# Patient Record
Sex: MALE | Race: OTHER | Employment: OTHER | URBAN - METROPOLITAN AREA
[De-identification: names, ages, dates, MRNs, and addresses within clinical notes are randomized per-mention and may not be internally consistent; named-entity substitution may affect disease eponyms.]

---

## 2017-02-25 ENCOUNTER — APPOINTMENT (OUTPATIENT)
Dept: GENERAL RADIOLOGY | Age: 57
End: 2017-02-25
Payer: COMMERCIAL

## 2017-02-25 ENCOUNTER — HOSPITAL ENCOUNTER (EMERGENCY)
Age: 57
Discharge: LEFT BEFORE DISCHARGE | End: 2017-02-25
Payer: COMMERCIAL

## 2017-02-25 VITALS
OXYGEN SATURATION: 100 % | BODY MASS INDEX: 22.9 KG/M2 | HEART RATE: 93 BPM | SYSTOLIC BLOOD PRESSURE: 141 MMHG | TEMPERATURE: 98.5 F | WEIGHT: 160 LBS | DIASTOLIC BLOOD PRESSURE: 70 MMHG | RESPIRATION RATE: 20 BRPM | HEIGHT: 70 IN

## 2017-02-25 DIAGNOSIS — R07.9 CHEST PAIN, UNSPECIFIED TYPE: Primary | ICD-10-CM

## 2017-02-25 LAB
ALBUMIN SERPL-MCNC: 4.2 G/DL (ref 3.9–4.9)
ALP BLD-CCNC: 68 U/L (ref 35–104)
ALT SERPL-CCNC: 24 U/L (ref 0–41)
AMPHETAMINE SCREEN, URINE: NORMAL
ANION GAP SERPL CALCULATED.3IONS-SCNC: 10 MEQ/L (ref 7–13)
APTT: 28 SEC (ref 21.6–35.4)
AST SERPL-CCNC: 25 U/L (ref 0–40)
BARBITURATE SCREEN URINE: NORMAL
BASOPHILS ABSOLUTE: 0 K/UL (ref 0–0.2)
BASOPHILS RELATIVE PERCENT: 0.3 %
BENZODIAZEPINE SCREEN, URINE: NORMAL
BILIRUB SERPL-MCNC: 0.4 MG/DL (ref 0–1.2)
BILIRUBIN URINE: NEGATIVE
BLOOD, URINE: NEGATIVE
BUN BLDV-MCNC: 14 MG/DL (ref 6–20)
CALCIUM SERPL-MCNC: 8.9 MG/DL (ref 8.6–10.2)
CANNABINOID SCREEN URINE: NORMAL
CHLORIDE BLD-SCNC: 100 MEQ/L (ref 98–107)
CLARITY: CLEAR
CO2: 25 MEQ/L (ref 22–29)
COCAINE METABOLITE SCREEN URINE: NORMAL
COLOR: YELLOW
CREAT SERPL-MCNC: 0.97 MG/DL (ref 0.7–1.2)
EOSINOPHILS ABSOLUTE: 0 K/UL (ref 0–0.7)
EOSINOPHILS RELATIVE PERCENT: 0.3 %
ETHANOL PERCENT: NORMAL G/DL
ETHANOL: <10 MG/DL (ref 0–0.08)
GFR AFRICAN AMERICAN: >60
GFR NON-AFRICAN AMERICAN: >60
GLOBULIN: 2.8 G/DL (ref 2.3–3.5)
GLUCOSE BLD-MCNC: 104 MG/DL (ref 74–109)
GLUCOSE URINE: NEGATIVE MG/DL
HCT VFR BLD CALC: 41.3 % (ref 42–52)
HEMOGLOBIN: 14.1 G/DL (ref 14–18)
INR BLD: 1
KETONES, URINE: NEGATIVE MG/DL
LACTIC ACID: 1 MMOL/L (ref 0.5–2.2)
LEUKOCYTE ESTERASE, URINE: NEGATIVE
LYMPHOCYTES ABSOLUTE: 0.8 K/UL (ref 1–4.8)
LYMPHOCYTES RELATIVE PERCENT: 9.3 %
Lab: NORMAL
MCH RBC QN AUTO: 30.8 PG (ref 27–31.3)
MCHC RBC AUTO-ENTMCNC: 34.1 % (ref 33–37)
MCV RBC AUTO: 90.4 FL (ref 80–100)
MONOCYTES ABSOLUTE: 0.4 K/UL (ref 0.2–0.8)
MONOCYTES RELATIVE PERCENT: 4.4 %
NEUTROPHILS ABSOLUTE: 7.2 K/UL (ref 1.4–6.5)
NEUTROPHILS RELATIVE PERCENT: 85.7 %
NITRITE, URINE: NEGATIVE
OPIATE SCREEN URINE: NORMAL
PDW BLD-RTO: 14.2 % (ref 11.5–14.5)
PH UA: 6 (ref 5–9)
PHENCYCLIDINE SCREEN URINE: NORMAL
PLATELET # BLD: 207 K/UL (ref 130–400)
POTASSIUM SERPL-SCNC: 4.1 MEQ/L (ref 3.5–5.1)
PRO-BNP: 215 PG/ML
PROTEIN UA: NEGATIVE MG/DL
PROTHROMBIN TIME: 10.9 SEC (ref 8.1–13.7)
RBC # BLD: 4.57 M/UL (ref 4.7–6.1)
SODIUM BLD-SCNC: 135 MEQ/L (ref 132–144)
SPECIFIC GRAVITY UA: 1.01 (ref 1–1.03)
TOTAL CK: 109 U/L (ref 0–190)
TOTAL PROTEIN: 7 G/DL (ref 6.4–8.1)
TROPONIN: <0.01 NG/ML (ref 0–0.01)
URINE REFLEX TO CULTURE: NORMAL
UROBILINOGEN, URINE: 0.2 E.U./DL
WBC # BLD: 8.4 K/UL (ref 4.8–10.8)

## 2017-02-25 PROCEDURE — 2580000003 HC RX 258: Performed by: PHYSICIAN ASSISTANT

## 2017-02-25 PROCEDURE — 93005 ELECTROCARDIOGRAM TRACING: CPT

## 2017-02-25 PROCEDURE — 99285 EMERGENCY DEPT VISIT HI MDM: CPT

## 2017-02-25 PROCEDURE — 71020 XR CHEST STANDARD TWO VW: CPT

## 2017-02-25 RX ORDER — 0.9 % SODIUM CHLORIDE 0.9 %
1000 INTRAVENOUS SOLUTION INTRAVENOUS ONCE
Status: COMPLETED | OUTPATIENT
Start: 2017-02-25 | End: 2017-02-25

## 2017-02-25 RX ADMIN — SODIUM CHLORIDE 1000 ML: 9 INJECTION, SOLUTION INTRAVENOUS at 16:32

## 2017-02-25 ASSESSMENT — PAIN DESCRIPTION - DESCRIPTORS
DESCRIPTORS: HEADACHE;TIGHTNESS
DESCRIPTORS: HEADACHE

## 2017-02-25 ASSESSMENT — ENCOUNTER SYMPTOMS
PHOTOPHOBIA: 0
ABDOMINAL PAIN: 0
VOMITING: 0
BACK PAIN: 0
NAUSEA: 0
CHEST TIGHTNESS: 1
TROUBLE SWALLOWING: 0
DIARRHEA: 0
SHORTNESS OF BREATH: 1
COUGH: 1
SORE THROAT: 0

## 2017-02-25 ASSESSMENT — PAIN DESCRIPTION - LOCATION
LOCATION: CHEST;HEAD
LOCATION: HEAD

## 2017-02-25 ASSESSMENT — PAIN SCALES - GENERAL
PAINLEVEL_OUTOF10: 5
PAINLEVEL_OUTOF10: 5

## 2017-02-25 ASSESSMENT — HEART SCORE: ECG: 0

## 2017-02-27 LAB
EKG ATRIAL RATE: 73 BPM
EKG P AXIS: 65 DEGREES
EKG P-R INTERVAL: 136 MS
EKG Q-T INTERVAL: 454 MS
EKG QRS DURATION: 136 MS
EKG QTC CALCULATION (BAZETT): 500 MS
EKG R AXIS: 95 DEGREES
EKG T AXIS: -8 DEGREES
EKG VENTRICULAR RATE: 73 BPM

## 2017-04-05 ENCOUNTER — HOSPITAL ENCOUNTER (INPATIENT)
Age: 57
LOS: 4 days | Discharge: LEFT BEFORE DISCHARGE | DRG: 950 | End: 2017-04-09
Attending: EMERGENCY MEDICINE | Admitting: FAMILY MEDICINE
Payer: COMMERCIAL

## 2017-04-05 ENCOUNTER — APPOINTMENT (OUTPATIENT)
Dept: GENERAL RADIOLOGY | Age: 57
DRG: 950 | End: 2017-04-05
Payer: COMMERCIAL

## 2017-04-05 DIAGNOSIS — L03.114 CELLULITIS OF LEFT UPPER EXTREMITY: ICD-10-CM

## 2017-04-05 DIAGNOSIS — L03.113 CELLULITIS OF RIGHT UPPER EXTREMITY: ICD-10-CM

## 2017-04-05 DIAGNOSIS — M70.70 HIP BURSITIS: ICD-10-CM

## 2017-04-05 DIAGNOSIS — M54.50 LOW BACK PAIN: ICD-10-CM

## 2017-04-05 DIAGNOSIS — M54.16 LUMBAR RADICULAR PAIN: ICD-10-CM

## 2017-04-05 DIAGNOSIS — I47.1 PAROXYSMAL SUPRAVENTRICULAR TACHYCARDIA (HCC): ICD-10-CM

## 2017-04-05 DIAGNOSIS — M79.10 MUSCULAR PAIN: ICD-10-CM

## 2017-04-05 DIAGNOSIS — L02.91 ABSCESS: Primary | ICD-10-CM

## 2017-04-05 LAB
ALBUMIN SERPL-MCNC: 3.5 G/DL (ref 3.9–4.9)
ALP BLD-CCNC: 55 U/L (ref 35–104)
ALT SERPL-CCNC: 20 U/L (ref 0–41)
ANION GAP SERPL CALCULATED.3IONS-SCNC: 11 MEQ/L (ref 7–13)
AST SERPL-CCNC: 17 U/L (ref 0–40)
BILIRUB SERPL-MCNC: 0.5 MG/DL (ref 0–1.2)
BUN BLDV-MCNC: 16 MG/DL (ref 6–20)
CALCIUM SERPL-MCNC: 8.6 MG/DL (ref 8.6–10.2)
CHLORIDE BLD-SCNC: 100 MEQ/L (ref 98–107)
CO2: 26 MEQ/L (ref 22–29)
CREAT SERPL-MCNC: 1.16 MG/DL (ref 0.7–1.2)
GFR AFRICAN AMERICAN: >60
GFR NON-AFRICAN AMERICAN: >60
GLOBULIN: 3 G/DL (ref 2.3–3.5)
GLUCOSE BLD-MCNC: 111 MG/DL (ref 74–109)
HCT VFR BLD CALC: 37.4 % (ref 42–52)
HEMOGLOBIN: 12.9 G/DL (ref 14–18)
MCH RBC QN AUTO: 30.5 PG (ref 27–31.3)
MCHC RBC AUTO-ENTMCNC: 34.6 % (ref 33–37)
MCV RBC AUTO: 88.2 FL (ref 80–100)
PDW BLD-RTO: 14 % (ref 11.5–14.5)
PLATELET # BLD: 224 K/UL (ref 130–400)
POTASSIUM SERPL-SCNC: 3.7 MEQ/L (ref 3.5–5.1)
RBC # BLD: 4.24 M/UL (ref 4.7–6.1)
SODIUM BLD-SCNC: 137 MEQ/L (ref 132–144)
TOTAL PROTEIN: 6.5 G/DL (ref 6.4–8.1)
WBC # BLD: 13.1 K/UL (ref 4.8–10.8)

## 2017-04-05 PROCEDURE — 87070 CULTURE OTHR SPECIMN AEROBIC: CPT

## 2017-04-05 PROCEDURE — 99285 EMERGENCY DEPT VISIT HI MDM: CPT

## 2017-04-05 PROCEDURE — 87205 SMEAR GRAM STAIN: CPT

## 2017-04-05 PROCEDURE — 96365 THER/PROPH/DIAG IV INF INIT: CPT

## 2017-04-05 PROCEDURE — 87075 CULTR BACTERIA EXCEPT BLOOD: CPT

## 2017-04-05 PROCEDURE — 96366 THER/PROPH/DIAG IV INF ADDON: CPT

## 2017-04-05 PROCEDURE — 93005 ELECTROCARDIOGRAM TRACING: CPT

## 2017-04-05 PROCEDURE — 80053 COMPREHEN METABOLIC PANEL: CPT

## 2017-04-05 PROCEDURE — 85027 COMPLETE CBC AUTOMATED: CPT

## 2017-04-05 PROCEDURE — 73090 X-RAY EXAM OF FOREARM: CPT

## 2017-04-05 PROCEDURE — 2580000003 HC RX 258: Performed by: EMERGENCY MEDICINE

## 2017-04-05 PROCEDURE — 87040 BLOOD CULTURE FOR BACTERIA: CPT

## 2017-04-05 PROCEDURE — 6360000002 HC RX W HCPCS: Performed by: EMERGENCY MEDICINE

## 2017-04-05 PROCEDURE — 36415 COLL VENOUS BLD VENIPUNCTURE: CPT

## 2017-04-05 PROCEDURE — 1210000000 HC MED SURG R&B

## 2017-04-05 PROCEDURE — 73080 X-RAY EXAM OF ELBOW: CPT

## 2017-04-05 PROCEDURE — 96375 TX/PRO/DX INJ NEW DRUG ADDON: CPT

## 2017-04-05 RX ORDER — FAMOTIDINE 20 MG/1
20 TABLET, FILM COATED ORAL 2 TIMES DAILY
Status: DISCONTINUED | OUTPATIENT
Start: 2017-04-05 | End: 2017-04-09 | Stop reason: HOSPADM

## 2017-04-05 RX ORDER — KETOROLAC TROMETHAMINE 30 MG/ML
30 INJECTION, SOLUTION INTRAMUSCULAR; INTRAVENOUS ONCE
Status: COMPLETED | OUTPATIENT
Start: 2017-04-05 | End: 2017-04-05

## 2017-04-05 RX ORDER — CITALOPRAM 40 MG/1
40 TABLET ORAL DAILY
Status: ON HOLD | COMMUNITY
End: 2017-06-30 | Stop reason: HOSPADM

## 2017-04-05 RX ORDER — ONDANSETRON 2 MG/ML
4 INJECTION INTRAMUSCULAR; INTRAVENOUS EVERY 6 HOURS PRN
Status: DISCONTINUED | OUTPATIENT
Start: 2017-04-05 | End: 2017-04-09 | Stop reason: HOSPADM

## 2017-04-05 RX ORDER — 0.9 % SODIUM CHLORIDE 0.9 %
1000 INTRAVENOUS SOLUTION INTRAVENOUS ONCE
Status: COMPLETED | OUTPATIENT
Start: 2017-04-05 | End: 2017-04-05

## 2017-04-05 RX ORDER — SODIUM CHLORIDE 0.9 % (FLUSH) 0.9 %
10 SYRINGE (ML) INJECTION PRN
Status: DISCONTINUED | OUTPATIENT
Start: 2017-04-05 | End: 2017-04-09 | Stop reason: HOSPADM

## 2017-04-05 RX ORDER — SODIUM CHLORIDE 0.9 % (FLUSH) 0.9 %
10 SYRINGE (ML) INJECTION EVERY 12 HOURS SCHEDULED
Status: DISCONTINUED | OUTPATIENT
Start: 2017-04-05 | End: 2017-04-09 | Stop reason: HOSPADM

## 2017-04-05 RX ORDER — LISINOPRIL 10 MG/1
20 TABLET ORAL DAILY
Status: ON HOLD | COMMUNITY
End: 2017-06-30 | Stop reason: HOSPADM

## 2017-04-05 RX ORDER — TRAMADOL HYDROCHLORIDE 50 MG/1
50 TABLET ORAL EVERY 6 HOURS PRN
Status: DISCONTINUED | OUTPATIENT
Start: 2017-04-05 | End: 2017-04-06 | Stop reason: ALTCHOICE

## 2017-04-05 RX ORDER — IBUPROFEN 400 MG/1
400 TABLET ORAL EVERY 6 HOURS PRN
Status: DISCONTINUED | OUTPATIENT
Start: 2017-04-05 | End: 2017-04-08

## 2017-04-05 RX ORDER — BUPRENORPHINE HYDROCHLORIDE AND NALOXONE HYDROCHLORIDE DIHYDRATE 8; 2 MG/1; MG/1
1 TABLET SUBLINGUAL 2 TIMES DAILY
COMMUNITY
End: 2019-01-01

## 2017-04-05 RX ORDER — ACETAMINOPHEN 325 MG/1
650 TABLET ORAL EVERY 4 HOURS PRN
Status: DISCONTINUED | OUTPATIENT
Start: 2017-04-05 | End: 2017-04-06 | Stop reason: ALTCHOICE

## 2017-04-05 RX ADMIN — VANCOMYCIN HYDROCHLORIDE 1000 MG: 1 INJECTION, POWDER, LYOPHILIZED, FOR SOLUTION INTRAVENOUS at 20:03

## 2017-04-05 RX ADMIN — SODIUM CHLORIDE 1000 ML: 900 INJECTION, SOLUTION INTRAVENOUS at 20:03

## 2017-04-05 RX ADMIN — KETOROLAC TROMETHAMINE 30 MG: 30 INJECTION, SOLUTION INTRAMUSCULAR; INTRAVENOUS at 20:02

## 2017-04-05 ASSESSMENT — PAIN SCALES - GENERAL
PAINLEVEL_OUTOF10: 4
PAINLEVEL_OUTOF10: 0
PAINLEVEL_OUTOF10: 8

## 2017-04-06 LAB
ANION GAP SERPL CALCULATED.3IONS-SCNC: 8 MEQ/L (ref 7–13)
BASOPHILS ABSOLUTE: 0 K/UL (ref 0–0.2)
BASOPHILS RELATIVE PERCENT: 0.5 %
BUN BLDV-MCNC: 21 MG/DL (ref 6–20)
CALCIUM SERPL-MCNC: 8.3 MG/DL (ref 8.6–10.2)
CHLORIDE BLD-SCNC: 104 MEQ/L (ref 98–107)
CO2: 24 MEQ/L (ref 22–29)
CREAT SERPL-MCNC: 1.4 MG/DL (ref 0.7–1.2)
EOSINOPHILS ABSOLUTE: 0.1 K/UL (ref 0–0.7)
EOSINOPHILS RELATIVE PERCENT: 1.3 %
GFR AFRICAN AMERICAN: >60
GFR NON-AFRICAN AMERICAN: 52.1
GLUCOSE BLD-MCNC: 122 MG/DL (ref 74–109)
GLUCOSE BLD-MCNC: 142 MG/DL (ref 60–115)
HCT VFR BLD CALC: 36.5 % (ref 42–52)
HEMOGLOBIN: 12.7 G/DL (ref 14–18)
LYMPHOCYTES ABSOLUTE: 1.3 K/UL (ref 1–4.8)
LYMPHOCYTES RELATIVE PERCENT: 15.6 %
MCH RBC QN AUTO: 30.8 PG (ref 27–31.3)
MCHC RBC AUTO-ENTMCNC: 34.8 % (ref 33–37)
MCV RBC AUTO: 88.5 FL (ref 80–100)
MONOCYTES ABSOLUTE: 0.6 K/UL (ref 0.2–0.8)
MONOCYTES RELATIVE PERCENT: 6.4 %
NEUTROPHILS ABSOLUTE: 6.5 K/UL (ref 1.4–6.5)
NEUTROPHILS RELATIVE PERCENT: 76.2 %
PDW BLD-RTO: 14.1 % (ref 11.5–14.5)
PERFORMED ON: ABNORMAL
PLATELET # BLD: 214 K/UL (ref 130–400)
POTASSIUM SERPL-SCNC: 3.8 MEQ/L (ref 3.5–5.1)
RBC # BLD: 4.12 M/UL (ref 4.7–6.1)
SODIUM BLD-SCNC: 136 MEQ/L (ref 132–144)
VANCOMYCIN TROUGH: 15.3 UG/ML (ref 10–20)
WBC # BLD: 8.6 K/UL (ref 4.8–10.8)

## 2017-04-06 PROCEDURE — 85025 COMPLETE CBC W/AUTO DIFF WBC: CPT

## 2017-04-06 PROCEDURE — 6370000000 HC RX 637 (ALT 250 FOR IP): Performed by: INTERNAL MEDICINE

## 2017-04-06 PROCEDURE — 36415 COLL VENOUS BLD VENIPUNCTURE: CPT

## 2017-04-06 PROCEDURE — 80048 BASIC METABOLIC PNL TOTAL CA: CPT

## 2017-04-06 PROCEDURE — 80202 ASSAY OF VANCOMYCIN: CPT

## 2017-04-06 PROCEDURE — 2580000003 HC RX 258: Performed by: INTERNAL MEDICINE

## 2017-04-06 PROCEDURE — 2580000003 HC RX 258: Performed by: FAMILY MEDICINE

## 2017-04-06 PROCEDURE — 6360000002 HC RX W HCPCS: Performed by: INTERNAL MEDICINE

## 2017-04-06 PROCEDURE — 86703 HIV-1/HIV-2 1 RESULT ANTBDY: CPT

## 2017-04-06 PROCEDURE — 94664 DEMO&/EVAL PT USE INHALER: CPT

## 2017-04-06 PROCEDURE — 99254 IP/OBS CNSLTJ NEW/EST MOD 60: CPT | Performed by: INTERNAL MEDICINE

## 2017-04-06 PROCEDURE — 1210000000 HC MED SURG R&B

## 2017-04-06 RX ORDER — ALBUTEROL SULFATE 90 UG/1
2 AEROSOL, METERED RESPIRATORY (INHALATION) EVERY 6 HOURS PRN
Status: DISCONTINUED | OUTPATIENT
Start: 2017-04-06 | End: 2017-04-09 | Stop reason: HOSPADM

## 2017-04-06 RX ORDER — TRAMADOL HYDROCHLORIDE 50 MG/1
100 TABLET ORAL EVERY 6 HOURS
Status: COMPLETED | OUTPATIENT
Start: 2017-04-06 | End: 2017-04-06

## 2017-04-06 RX ORDER — MAGNESIUM HYDROXIDE/ALUMINUM HYDROXICE/SIMETHICONE 120; 1200; 1200 MG/30ML; MG/30ML; MG/30ML
30 SUSPENSION ORAL 2 TIMES DAILY PRN
Status: DISCONTINUED | OUTPATIENT
Start: 2017-04-06 | End: 2017-04-09 | Stop reason: HOSPADM

## 2017-04-06 RX ORDER — BUPRENORPHINE HYDROCHLORIDE AND NALOXONE HYDROCHLORIDE DIHYDRATE 8; 2 MG/1; MG/1
1 TABLET SUBLINGUAL 3 TIMES DAILY
Status: DISCONTINUED | OUTPATIENT
Start: 2017-04-06 | End: 2017-04-09 | Stop reason: HOSPADM

## 2017-04-06 RX ORDER — CITALOPRAM 20 MG/1
40 TABLET ORAL DAILY
Status: DISCONTINUED | OUTPATIENT
Start: 2017-04-06 | End: 2017-04-09 | Stop reason: HOSPADM

## 2017-04-06 RX ORDER — SODIUM CHLORIDE 9 MG/ML
INJECTION, SOLUTION INTRAVENOUS CONTINUOUS
Status: DISCONTINUED | OUTPATIENT
Start: 2017-04-06 | End: 2017-04-09 | Stop reason: HOSPADM

## 2017-04-06 RX ORDER — QUETIAPINE 300 MG/1
300 TABLET, FILM COATED, EXTENDED RELEASE ORAL NIGHTLY
Status: DISCONTINUED | OUTPATIENT
Start: 2017-04-06 | End: 2017-04-09 | Stop reason: HOSPADM

## 2017-04-06 RX ORDER — TRAMADOL HYDROCHLORIDE 50 MG/1
50 TABLET ORAL EVERY 6 HOURS PRN
Status: DISCONTINUED | OUTPATIENT
Start: 2017-04-06 | End: 2017-04-09 | Stop reason: HOSPADM

## 2017-04-06 RX ORDER — TRAMADOL HYDROCHLORIDE 50 MG/1
50 TABLET ORAL EVERY 6 HOURS
Status: DISPENSED | OUTPATIENT
Start: 2017-04-07 | End: 2017-04-08

## 2017-04-06 RX ORDER — ACETAMINOPHEN 325 MG/1
650 TABLET ORAL EVERY 4 HOURS PRN
Status: DISCONTINUED | OUTPATIENT
Start: 2017-04-06 | End: 2017-04-09 | Stop reason: HOSPADM

## 2017-04-06 RX ORDER — METHYLPREDNISOLONE ACETATE 80 MG/ML
80 INJECTION, SUSPENSION INTRA-ARTICULAR; INTRALESIONAL; INTRAMUSCULAR; SOFT TISSUE ONCE
Status: DISCONTINUED | OUTPATIENT
Start: 2017-04-06 | End: 2017-04-09 | Stop reason: HOSPADM

## 2017-04-06 RX ORDER — LISINOPRIL 10 MG/1
10 TABLET ORAL DAILY
Status: DISCONTINUED | OUTPATIENT
Start: 2017-04-06 | End: 2017-04-06

## 2017-04-06 RX ADMIN — TRAMADOL HYDROCHLORIDE 100 MG: 50 TABLET, FILM COATED ORAL at 08:21

## 2017-04-06 RX ADMIN — FAMOTIDINE 20 MG: 20 TABLET ORAL at 00:32

## 2017-04-06 RX ADMIN — BUPRENORPHINE AND NALOXONE 1 TABLET: 8; 2 TABLET SUBLINGUAL at 15:17

## 2017-04-06 RX ADMIN — FAMOTIDINE 20 MG: 20 TABLET ORAL at 22:34

## 2017-04-06 RX ADMIN — BUPRENORPHINE AND NALOXONE 1 TABLET: 8; 2 TABLET SUBLINGUAL at 08:54

## 2017-04-06 RX ADMIN — QUETIAPINE 300 MG: 300 TABLET, EXTENDED RELEASE ORAL at 22:30

## 2017-04-06 RX ADMIN — SODIUM CHLORIDE, PRESERVATIVE FREE 10 ML: 5 INJECTION INTRAVENOUS at 00:34

## 2017-04-06 RX ADMIN — FAMOTIDINE 20 MG: 20 TABLET ORAL at 08:20

## 2017-04-06 RX ADMIN — VANCOMYCIN HYDROCHLORIDE 1000 MG: 1 INJECTION, POWDER, LYOPHILIZED, FOR SOLUTION INTRAVENOUS at 00:35

## 2017-04-06 RX ADMIN — CITALOPRAM HYDROBROMIDE 40 MG: 20 TABLET ORAL at 08:22

## 2017-04-06 RX ADMIN — BUPRENORPHINE AND NALOXONE 1 TABLET: 8; 2 TABLET SUBLINGUAL at 22:30

## 2017-04-06 RX ADMIN — VANCOMYCIN HYDROCHLORIDE 1000 MG: 1 INJECTION, POWDER, LYOPHILIZED, FOR SOLUTION INTRAVENOUS at 22:29

## 2017-04-06 RX ADMIN — ENOXAPARIN SODIUM 40 MG: 100 INJECTION SUBCUTANEOUS at 08:22

## 2017-04-06 RX ADMIN — METOPROLOL TARTRATE 25 MG: 25 TABLET, FILM COATED ORAL at 08:22

## 2017-04-06 RX ADMIN — VANCOMYCIN HYDROCHLORIDE 1000 MG: 1 INJECTION, POWDER, LYOPHILIZED, FOR SOLUTION INTRAVENOUS at 08:40

## 2017-04-06 RX ADMIN — TRAMADOL HYDROCHLORIDE 100 MG: 50 TABLET, FILM COATED ORAL at 15:13

## 2017-04-06 RX ADMIN — LISINOPRIL 10 MG: 10 TABLET ORAL at 08:20

## 2017-04-06 RX ADMIN — TRAMADOL HYDROCHLORIDE 100 MG: 50 TABLET, FILM COATED ORAL at 02:24

## 2017-04-06 RX ADMIN — TRAMADOL HYDROCHLORIDE 100 MG: 50 TABLET, FILM COATED ORAL at 22:30

## 2017-04-06 RX ADMIN — METOPROLOL TARTRATE 25 MG: 25 TABLET, FILM COATED ORAL at 22:34

## 2017-04-06 RX ADMIN — SODIUM CHLORIDE, PRESERVATIVE FREE 10 ML: 5 INJECTION INTRAVENOUS at 08:50

## 2017-04-06 RX ADMIN — SODIUM CHLORIDE: 9 INJECTION, SOLUTION INTRAVENOUS at 11:31

## 2017-04-06 RX ADMIN — PIPERACILLIN AND TAZOBACTAM 3.38 G: 3; .375 INJECTION, POWDER, FOR SOLUTION INTRAVENOUS at 10:57

## 2017-04-06 ASSESSMENT — PAIN SCALES - GENERAL
PAINLEVEL_OUTOF10: 0
PAINLEVEL_OUTOF10: 7
PAINLEVEL_OUTOF10: 7
PAINLEVEL_OUTOF10: 9
PAINLEVEL_OUTOF10: 8
PAINLEVEL_OUTOF10: 8
PAINLEVEL_OUTOF10: 5

## 2017-04-07 LAB
ALBUMIN SERPL-MCNC: 2.8 G/DL (ref 3.9–4.9)
ALP BLD-CCNC: 46 U/L (ref 35–104)
ALT SERPL-CCNC: 18 U/L (ref 0–41)
ANION GAP SERPL CALCULATED.3IONS-SCNC: 10 MEQ/L (ref 7–13)
AST SERPL-CCNC: 19 U/L (ref 0–40)
BILIRUB SERPL-MCNC: 0.2 MG/DL (ref 0–1.2)
BILIRUBIN DIRECT: 0 MG/DL (ref 0–0.3)
BILIRUBIN, INDIRECT: 0.2 MG/DL (ref 0–0.6)
BUN BLDV-MCNC: 18 MG/DL (ref 6–20)
CALCIUM SERPL-MCNC: 8.5 MG/DL (ref 8.6–10.2)
CHLORIDE BLD-SCNC: 108 MEQ/L (ref 98–107)
CO2: 24 MEQ/L (ref 22–29)
CREAT SERPL-MCNC: 1.36 MG/DL (ref 0.7–1.2)
GFR AFRICAN AMERICAN: >60
GFR NON-AFRICAN AMERICAN: 53.9
GLUCOSE BLD-MCNC: 119 MG/DL (ref 74–109)
HCT VFR BLD CALC: 34.3 % (ref 42–52)
HEMOGLOBIN: 12.2 G/DL (ref 14–18)
HIV-1 AND HIV-2 ANTIBODIES: NEGATIVE
MAGNESIUM: 2.3 MG/DL (ref 1.7–2.3)
MCH RBC QN AUTO: 31.5 PG (ref 27–31.3)
MCHC RBC AUTO-ENTMCNC: 35.7 % (ref 33–37)
MCV RBC AUTO: 88.2 FL (ref 80–100)
PDW BLD-RTO: 14.1 % (ref 11.5–14.5)
PLATELET # BLD: 213 K/UL (ref 130–400)
POTASSIUM SERPL-SCNC: 4.1 MEQ/L (ref 3.5–5.1)
RBC # BLD: 3.89 M/UL (ref 4.7–6.1)
SODIUM BLD-SCNC: 142 MEQ/L (ref 132–144)
TOTAL PROTEIN: 5.6 G/DL (ref 6.4–8.1)
VANCOMYCIN TROUGH: 15.6 UG/ML (ref 10–20)
WBC # BLD: 5.2 K/UL (ref 4.8–10.8)

## 2017-04-07 PROCEDURE — 2580000003 HC RX 258: Performed by: INTERNAL MEDICINE

## 2017-04-07 PROCEDURE — 80076 HEPATIC FUNCTION PANEL: CPT

## 2017-04-07 PROCEDURE — 85027 COMPLETE CBC AUTOMATED: CPT

## 2017-04-07 PROCEDURE — 2580000003 HC RX 258: Performed by: FAMILY MEDICINE

## 2017-04-07 PROCEDURE — 6360000002 HC RX W HCPCS: Performed by: INTERNAL MEDICINE

## 2017-04-07 PROCEDURE — 1210000000 HC MED SURG R&B

## 2017-04-07 PROCEDURE — 2500000003 HC RX 250 WO HCPCS: Performed by: INTERNAL MEDICINE

## 2017-04-07 PROCEDURE — 99232 SBSQ HOSP IP/OBS MODERATE 35: CPT | Performed by: INTERNAL MEDICINE

## 2017-04-07 PROCEDURE — 80202 ASSAY OF VANCOMYCIN: CPT

## 2017-04-07 PROCEDURE — 6370000000 HC RX 637 (ALT 250 FOR IP): Performed by: INTERNAL MEDICINE

## 2017-04-07 PROCEDURE — 80048 BASIC METABOLIC PNL TOTAL CA: CPT

## 2017-04-07 PROCEDURE — 83735 ASSAY OF MAGNESIUM: CPT

## 2017-04-07 PROCEDURE — 36415 COLL VENOUS BLD VENIPUNCTURE: CPT

## 2017-04-07 RX ORDER — CLINDAMYCIN PHOSPHATE 900 MG/50ML
900 INJECTION INTRAVENOUS EVERY 8 HOURS
Status: DISCONTINUED | OUTPATIENT
Start: 2017-04-07 | End: 2017-04-08

## 2017-04-07 RX ADMIN — CITALOPRAM HYDROBROMIDE 40 MG: 20 TABLET ORAL at 10:06

## 2017-04-07 RX ADMIN — VANCOMYCIN HYDROCHLORIDE 1000 MG: 1 INJECTION, POWDER, LYOPHILIZED, FOR SOLUTION INTRAVENOUS at 22:44

## 2017-04-07 RX ADMIN — PIPERACILLIN AND TAZOBACTAM 3.38 G: 3; .375 INJECTION, POWDER, FOR SOLUTION INTRAVENOUS at 06:41

## 2017-04-07 RX ADMIN — BUPRENORPHINE AND NALOXONE 1 TABLET: 8; 2 TABLET SUBLINGUAL at 21:42

## 2017-04-07 RX ADMIN — PIPERACILLIN AND TAZOBACTAM 3.38 G: 3; .375 INJECTION, POWDER, FOR SOLUTION INTRAVENOUS at 00:28

## 2017-04-07 RX ADMIN — SODIUM CHLORIDE: 9 INJECTION, SOLUTION INTRAVENOUS at 06:42

## 2017-04-07 RX ADMIN — PIPERACILLIN AND TAZOBACTAM 3.38 G: 3; .375 INJECTION, POWDER, FOR SOLUTION INTRAVENOUS at 15:48

## 2017-04-07 RX ADMIN — FAMOTIDINE 20 MG: 20 TABLET ORAL at 21:42

## 2017-04-07 RX ADMIN — BUPRENORPHINE AND NALOXONE 1 TABLET: 8; 2 TABLET SUBLINGUAL at 14:40

## 2017-04-07 RX ADMIN — BUPRENORPHINE AND NALOXONE 1 TABLET: 8; 2 TABLET SUBLINGUAL at 10:06

## 2017-04-07 RX ADMIN — CLINDAMYCIN IN 5 PERCENT DEXTROSE 900 MG: 18 INJECTION, SOLUTION INTRAVENOUS at 14:40

## 2017-04-07 RX ADMIN — TRAMADOL HYDROCHLORIDE 50 MG: 50 TABLET, FILM COATED ORAL at 14:40

## 2017-04-07 RX ADMIN — FAMOTIDINE 20 MG: 20 TABLET ORAL at 10:07

## 2017-04-07 RX ADMIN — CLINDAMYCIN IN 5 PERCENT DEXTROSE 900 MG: 18 INJECTION, SOLUTION INTRAVENOUS at 21:43

## 2017-04-07 RX ADMIN — VANCOMYCIN HYDROCHLORIDE 1000 MG: 1 INJECTION, POWDER, LYOPHILIZED, FOR SOLUTION INTRAVENOUS at 10:05

## 2017-04-07 RX ADMIN — TRAMADOL HYDROCHLORIDE 50 MG: 50 TABLET, FILM COATED ORAL at 21:42

## 2017-04-07 RX ADMIN — QUETIAPINE 300 MG: 300 TABLET, EXTENDED RELEASE ORAL at 21:42

## 2017-04-07 RX ADMIN — METOPROLOL TARTRATE 25 MG: 25 TABLET, FILM COATED ORAL at 21:42

## 2017-04-07 RX ADMIN — TRAMADOL HYDROCHLORIDE 50 MG: 50 TABLET, FILM COATED ORAL at 10:06

## 2017-04-07 ASSESSMENT — PAIN SCALES - GENERAL
PAINLEVEL_OUTOF10: 0
PAINLEVEL_OUTOF10: 0
PAINLEVEL_OUTOF10: 7
PAINLEVEL_OUTOF10: 0

## 2017-04-08 ENCOUNTER — ANESTHESIA (OUTPATIENT)
Dept: OPERATING ROOM | Age: 57
DRG: 950 | End: 2017-04-08
Payer: COMMERCIAL

## 2017-04-08 ENCOUNTER — ANESTHESIA EVENT (OUTPATIENT)
Dept: OPERATING ROOM | Age: 57
DRG: 950 | End: 2017-04-08
Payer: COMMERCIAL

## 2017-04-08 VITALS — DIASTOLIC BLOOD PRESSURE: 52 MMHG | OXYGEN SATURATION: 92 % | SYSTOLIC BLOOD PRESSURE: 84 MMHG

## 2017-04-08 LAB
ANAEROBIC CULTURE: ABNORMAL
ANAEROBIC CULTURE: NORMAL
ANION GAP SERPL CALCULATED.3IONS-SCNC: 10 MEQ/L (ref 7–13)
BUN BLDV-MCNC: 17 MG/DL (ref 6–20)
CALCIUM SERPL-MCNC: 8.4 MG/DL (ref 8.6–10.2)
CHLORIDE BLD-SCNC: 107 MEQ/L (ref 98–107)
CO2: 25 MEQ/L (ref 22–29)
CREAT SERPL-MCNC: 1.69 MG/DL (ref 0.7–1.2)
GFR AFRICAN AMERICAN: 50.7
GFR NON-AFRICAN AMERICAN: 41.9
GLUCOSE BLD-MCNC: 110 MG/DL (ref 60–115)
GLUCOSE BLD-MCNC: 124 MG/DL (ref 60–115)
GLUCOSE BLD-MCNC: 93 MG/DL (ref 74–109)
GRAM STAIN RESULT: ABNORMAL
GRAM STAIN RESULT: NORMAL
HCT VFR BLD CALC: 35.7 % (ref 42–52)
HEMOGLOBIN: 12.4 G/DL (ref 14–18)
MAGNESIUM: 2.2 MG/DL (ref 1.7–2.3)
MCH RBC QN AUTO: 30.9 PG (ref 27–31.3)
MCHC RBC AUTO-ENTMCNC: 34.7 % (ref 33–37)
MCV RBC AUTO: 89.2 FL (ref 80–100)
ORGANISM: ABNORMAL
PDW BLD-RTO: 13.9 % (ref 11.5–14.5)
PERFORMED ON: ABNORMAL
PERFORMED ON: NORMAL
PLATELET # BLD: 234 K/UL (ref 130–400)
POTASSIUM SERPL-SCNC: 4.5 MEQ/L (ref 3.5–5.1)
RBC # BLD: 4.01 M/UL (ref 4.7–6.1)
SODIUM BLD-SCNC: 142 MEQ/L (ref 132–144)
VANCOMYCIN RANDOM: 23.3 UG/ML (ref 5–40)
WBC # BLD: 4.2 K/UL (ref 4.8–10.8)
WOUND/ABSCESS: ABNORMAL
WOUND/ABSCESS: NORMAL

## 2017-04-08 PROCEDURE — 7100000001 HC PACU RECOVERY - ADDTL 15 MIN: Performed by: SURGERY

## 2017-04-08 PROCEDURE — 36415 COLL VENOUS BLD VENIPUNCTURE: CPT

## 2017-04-08 PROCEDURE — 6360000002 HC RX W HCPCS: Performed by: INTERNAL MEDICINE

## 2017-04-08 PROCEDURE — 05BC0ZZ EXCISION OF LEFT BASILIC VEIN, OPEN APPROACH: ICD-10-PCS | Performed by: SURGERY

## 2017-04-08 PROCEDURE — 3600000012 HC SURGERY LEVEL 2 ADDTL 15MIN: Performed by: SURGERY

## 2017-04-08 PROCEDURE — 1210000000 HC MED SURG R&B

## 2017-04-08 PROCEDURE — 2580000003 HC RX 258: Performed by: FAMILY MEDICINE

## 2017-04-08 PROCEDURE — 80202 ASSAY OF VANCOMYCIN: CPT

## 2017-04-08 PROCEDURE — 0JBG0ZZ EXCISION OF RIGHT LOWER ARM SUBCUTANEOUS TISSUE AND FASCIA, OPEN APPROACH: ICD-10-PCS | Performed by: SURGERY

## 2017-04-08 PROCEDURE — 3700000001 HC ADD 15 MINUTES (ANESTHESIA): Performed by: SURGERY

## 2017-04-08 PROCEDURE — 2720000010 HC SURG SUPPLY STERILE: Performed by: SURGERY

## 2017-04-08 PROCEDURE — 0JBH0ZZ EXCISION OF LEFT LOWER ARM SUBCUTANEOUS TISSUE AND FASCIA, OPEN APPROACH: ICD-10-PCS | Performed by: SURGERY

## 2017-04-08 PROCEDURE — 3600000002 HC SURGERY LEVEL 2 BASE: Performed by: SURGERY

## 2017-04-08 PROCEDURE — 87070 CULTURE OTHR SPECIMN AEROBIC: CPT

## 2017-04-08 PROCEDURE — 2580000003 HC RX 258: Performed by: SURGERY

## 2017-04-08 PROCEDURE — 87075 CULTR BACTERIA EXCEPT BLOOD: CPT

## 2017-04-08 PROCEDURE — 2500000003 HC RX 250 WO HCPCS: Performed by: INTERNAL MEDICINE

## 2017-04-08 PROCEDURE — 85027 COMPLETE CBC AUTOMATED: CPT

## 2017-04-08 PROCEDURE — 83735 ASSAY OF MAGNESIUM: CPT

## 2017-04-08 PROCEDURE — 6360000002 HC RX W HCPCS: Performed by: STUDENT IN AN ORGANIZED HEALTH CARE EDUCATION/TRAINING PROGRAM

## 2017-04-08 PROCEDURE — 3700000000 HC ANESTHESIA ATTENDED CARE: Performed by: SURGERY

## 2017-04-08 PROCEDURE — 87205 SMEAR GRAM STAIN: CPT

## 2017-04-08 PROCEDURE — 2580000003 HC RX 258: Performed by: INTERNAL MEDICINE

## 2017-04-08 PROCEDURE — 2580000003 HC RX 258: Performed by: STUDENT IN AN ORGANIZED HEALTH CARE EDUCATION/TRAINING PROGRAM

## 2017-04-08 PROCEDURE — 80048 BASIC METABOLIC PNL TOTAL CA: CPT

## 2017-04-08 PROCEDURE — 99232 SBSQ HOSP IP/OBS MODERATE 35: CPT | Performed by: INTERNAL MEDICINE

## 2017-04-08 PROCEDURE — 05BB0ZZ EXCISION OF RIGHT BASILIC VEIN, OPEN APPROACH: ICD-10-PCS | Performed by: SURGERY

## 2017-04-08 PROCEDURE — 80074 ACUTE HEPATITIS PANEL: CPT

## 2017-04-08 PROCEDURE — 7100000000 HC PACU RECOVERY - FIRST 15 MIN: Performed by: SURGERY

## 2017-04-08 PROCEDURE — 6370000000 HC RX 637 (ALT 250 FOR IP): Performed by: INTERNAL MEDICINE

## 2017-04-08 RX ORDER — MEPERIDINE HYDROCHLORIDE 25 MG/ML
12.5 INJECTION INTRAMUSCULAR; INTRAVENOUS; SUBCUTANEOUS EVERY 5 MIN PRN
Status: DISCONTINUED | OUTPATIENT
Start: 2017-04-08 | End: 2017-04-08 | Stop reason: HOSPADM

## 2017-04-08 RX ORDER — FENTANYL CITRATE 50 UG/ML
50 INJECTION, SOLUTION INTRAMUSCULAR; INTRAVENOUS EVERY 10 MIN PRN
Status: DISCONTINUED | OUTPATIENT
Start: 2017-04-08 | End: 2017-04-08 | Stop reason: HOSPADM

## 2017-04-08 RX ORDER — DIPHENHYDRAMINE HYDROCHLORIDE 50 MG/ML
12.5 INJECTION INTRAMUSCULAR; INTRAVENOUS
Status: DISCONTINUED | OUTPATIENT
Start: 2017-04-08 | End: 2017-04-08 | Stop reason: HOSPADM

## 2017-04-08 RX ORDER — HYDROCODONE BITARTRATE AND ACETAMINOPHEN 5; 325 MG/1; MG/1
1 TABLET ORAL PRN
Status: DISCONTINUED | OUTPATIENT
Start: 2017-04-08 | End: 2017-04-08 | Stop reason: HOSPADM

## 2017-04-08 RX ORDER — SODIUM CHLORIDE 9 MG/ML
INJECTION, SOLUTION INTRAVENOUS CONTINUOUS PRN
Status: DISCONTINUED | OUTPATIENT
Start: 2017-04-08 | End: 2017-04-08 | Stop reason: SDUPTHER

## 2017-04-08 RX ORDER — METOCLOPRAMIDE HYDROCHLORIDE 5 MG/ML
10 INJECTION INTRAMUSCULAR; INTRAVENOUS
Status: DISCONTINUED | OUTPATIENT
Start: 2017-04-08 | End: 2017-04-08 | Stop reason: HOSPADM

## 2017-04-08 RX ORDER — HYDROCODONE BITARTRATE AND ACETAMINOPHEN 5; 325 MG/1; MG/1
2 TABLET ORAL PRN
Status: DISCONTINUED | OUTPATIENT
Start: 2017-04-08 | End: 2017-04-08 | Stop reason: HOSPADM

## 2017-04-08 RX ORDER — PROPOFOL 10 MG/ML
INJECTION, EMULSION INTRAVENOUS PRN
Status: DISCONTINUED | OUTPATIENT
Start: 2017-04-08 | End: 2017-04-08 | Stop reason: SDUPTHER

## 2017-04-08 RX ORDER — FENTANYL CITRATE 50 UG/ML
INJECTION, SOLUTION INTRAMUSCULAR; INTRAVENOUS PRN
Status: DISCONTINUED | OUTPATIENT
Start: 2017-04-08 | End: 2017-04-08 | Stop reason: SDUPTHER

## 2017-04-08 RX ORDER — ONDANSETRON 2 MG/ML
4 INJECTION INTRAMUSCULAR; INTRAVENOUS
Status: DISCONTINUED | OUTPATIENT
Start: 2017-04-08 | End: 2017-04-08 | Stop reason: HOSPADM

## 2017-04-08 RX ORDER — MAGNESIUM HYDROXIDE 1200 MG/15ML
LIQUID ORAL CONTINUOUS PRN
Status: DISCONTINUED | OUTPATIENT
Start: 2017-04-08 | End: 2017-04-08 | Stop reason: HOSPADM

## 2017-04-08 RX ADMIN — PIPERACILLIN AND TAZOBACTAM 3.38 G: 3; .375 INJECTION, POWDER, FOR SOLUTION INTRAVENOUS at 16:43

## 2017-04-08 RX ADMIN — QUETIAPINE 300 MG: 300 TABLET, EXTENDED RELEASE ORAL at 22:04

## 2017-04-08 RX ADMIN — HYDROMORPHONE HYDROCHLORIDE 0.5 MG: 1 INJECTION, SOLUTION INTRAMUSCULAR; INTRAVENOUS; SUBCUTANEOUS at 10:04

## 2017-04-08 RX ADMIN — VANCOMYCIN HYDROCHLORIDE 1000 MG: 1 INJECTION, POWDER, LYOPHILIZED, FOR SOLUTION INTRAVENOUS at 09:59

## 2017-04-08 RX ADMIN — FENTANYL CITRATE 50 MCG: 50 INJECTION, SOLUTION INTRAMUSCULAR; INTRAVENOUS at 08:25

## 2017-04-08 RX ADMIN — HYDROMORPHONE HYDROCHLORIDE 0.5 MG: 1 INJECTION, SOLUTION INTRAMUSCULAR; INTRAVENOUS; SUBCUTANEOUS at 10:17

## 2017-04-08 RX ADMIN — SODIUM CHLORIDE, PRESERVATIVE FREE 10 ML: 5 INJECTION INTRAVENOUS at 21:30

## 2017-04-08 RX ADMIN — SODIUM CHLORIDE: 900 INJECTION, SOLUTION INTRAVENOUS at 08:03

## 2017-04-08 RX ADMIN — SODIUM CHLORIDE: 900 INJECTION, SOLUTION INTRAVENOUS at 09:35

## 2017-04-08 RX ADMIN — FAMOTIDINE 20 MG: 20 TABLET ORAL at 22:04

## 2017-04-08 RX ADMIN — HYDROMORPHONE HYDROCHLORIDE 0.5 MG: 1 INJECTION, SOLUTION INTRAMUSCULAR; INTRAVENOUS; SUBCUTANEOUS at 10:35

## 2017-04-08 RX ADMIN — FENTANYL CITRATE 50 MCG: 50 INJECTION, SOLUTION INTRAMUSCULAR; INTRAVENOUS at 08:11

## 2017-04-08 RX ADMIN — PIPERACILLIN AND TAZOBACTAM 3.38 G: 3; .375 INJECTION, POWDER, FOR SOLUTION INTRAVENOUS at 00:17

## 2017-04-08 RX ADMIN — VANCOMYCIN HYDROCHLORIDE 1000 MG: 1 INJECTION, POWDER, LYOPHILIZED, FOR SOLUTION INTRAVENOUS at 22:03

## 2017-04-08 RX ADMIN — BUPRENORPHINE AND NALOXONE 1 TABLET: 8; 2 TABLET SUBLINGUAL at 13:30

## 2017-04-08 RX ADMIN — PIPERACILLIN AND TAZOBACTAM 3.38 G: 3; .375 INJECTION, POWDER, FOR SOLUTION INTRAVENOUS at 08:01

## 2017-04-08 RX ADMIN — HYDROMORPHONE HYDROCHLORIDE 0.5 MG: 1 INJECTION, SOLUTION INTRAMUSCULAR; INTRAVENOUS; SUBCUTANEOUS at 10:28

## 2017-04-08 RX ADMIN — METOPROLOL TARTRATE 25 MG: 25 TABLET, FILM COATED ORAL at 22:04

## 2017-04-08 RX ADMIN — SODIUM CHLORIDE: 9 INJECTION, SOLUTION INTRAVENOUS at 01:51

## 2017-04-08 RX ADMIN — PROPOFOL 200 MG: 10 INJECTION, EMULSION INTRAVENOUS at 08:08

## 2017-04-08 RX ADMIN — CLINDAMYCIN IN 5 PERCENT DEXTROSE 900 MG: 18 INJECTION, SOLUTION INTRAVENOUS at 04:03

## 2017-04-08 RX ADMIN — BUPRENORPHINE AND NALOXONE 1 TABLET: 8; 2 TABLET SUBLINGUAL at 22:04

## 2017-04-08 ASSESSMENT — PAIN SCALES - GENERAL
PAINLEVEL_OUTOF10: 10
PAINLEVEL_OUTOF10: 8
PAINLEVEL_OUTOF10: 9
PAINLEVEL_OUTOF10: 3
PAINLEVEL_OUTOF10: 9
PAINLEVEL_OUTOF10: 7
PAINLEVEL_OUTOF10: 0

## 2017-04-09 VITALS
RESPIRATION RATE: 18 BRPM | OXYGEN SATURATION: 100 % | HEIGHT: 70 IN | HEART RATE: 60 BPM | DIASTOLIC BLOOD PRESSURE: 56 MMHG | WEIGHT: 160 LBS | BODY MASS INDEX: 22.9 KG/M2 | TEMPERATURE: 98.2 F | SYSTOLIC BLOOD PRESSURE: 122 MMHG

## 2017-04-09 LAB
ANION GAP SERPL CALCULATED.3IONS-SCNC: 10 MEQ/L (ref 7–13)
BASOPHILS ABSOLUTE: 0 K/UL (ref 0–0.2)
BASOPHILS RELATIVE PERCENT: 0.5 %
BUN BLDV-MCNC: 16 MG/DL (ref 6–20)
CALCIUM SERPL-MCNC: 8.2 MG/DL (ref 8.6–10.2)
CHLORIDE BLD-SCNC: 103 MEQ/L (ref 98–107)
CO2: 25 MEQ/L (ref 22–29)
CREAT SERPL-MCNC: 1.56 MG/DL (ref 0.7–1.2)
EOSINOPHILS ABSOLUTE: 0.1 K/UL (ref 0–0.7)
EOSINOPHILS RELATIVE PERCENT: 1.8 %
GFR AFRICAN AMERICAN: 55.6
GFR NON-AFRICAN AMERICAN: 46
GLUCOSE BLD-MCNC: 115 MG/DL (ref 74–109)
HCT VFR BLD CALC: 34.4 % (ref 42–52)
HEMOGLOBIN: 11.9 G/DL (ref 14–18)
LYMPHOCYTES ABSOLUTE: 1.2 K/UL (ref 1–4.8)
LYMPHOCYTES RELATIVE PERCENT: 16.6 %
MCH RBC QN AUTO: 31 PG (ref 27–31.3)
MCHC RBC AUTO-ENTMCNC: 34.7 % (ref 33–37)
MCV RBC AUTO: 89.4 FL (ref 80–100)
MONOCYTES ABSOLUTE: 0.6 K/UL (ref 0.2–0.8)
MONOCYTES RELATIVE PERCENT: 7.8 %
NEUTROPHILS ABSOLUTE: 5.3 K/UL (ref 1.4–6.5)
NEUTROPHILS RELATIVE PERCENT: 73.3 %
PDW BLD-RTO: 14.1 % (ref 11.5–14.5)
PLATELET # BLD: 232 K/UL (ref 130–400)
POTASSIUM SERPL-SCNC: 4.2 MEQ/L (ref 3.5–5.1)
RBC # BLD: 3.84 M/UL (ref 4.7–6.1)
SODIUM BLD-SCNC: 138 MEQ/L (ref 132–144)
VANCOMYCIN TROUGH: 14.9 UG/ML (ref 10–20)
WBC # BLD: 7.2 K/UL (ref 4.8–10.8)

## 2017-04-09 PROCEDURE — 6360000002 HC RX W HCPCS: Performed by: INTERNAL MEDICINE

## 2017-04-09 PROCEDURE — 6370000000 HC RX 637 (ALT 250 FOR IP): Performed by: INTERNAL MEDICINE

## 2017-04-09 PROCEDURE — 80202 ASSAY OF VANCOMYCIN: CPT

## 2017-04-09 PROCEDURE — 2580000003 HC RX 258: Performed by: INTERNAL MEDICINE

## 2017-04-09 PROCEDURE — 36415 COLL VENOUS BLD VENIPUNCTURE: CPT

## 2017-04-09 PROCEDURE — 80048 BASIC METABOLIC PNL TOTAL CA: CPT

## 2017-04-09 PROCEDURE — 85025 COMPLETE CBC W/AUTO DIFF WBC: CPT

## 2017-04-09 RX ADMIN — BUPRENORPHINE AND NALOXONE 1 TABLET: 8; 2 TABLET SUBLINGUAL at 09:55

## 2017-04-09 RX ADMIN — FAMOTIDINE 20 MG: 20 TABLET ORAL at 09:55

## 2017-04-09 RX ADMIN — PIPERACILLIN AND TAZOBACTAM 3.38 G: 3; .375 INJECTION, POWDER, FOR SOLUTION INTRAVENOUS at 09:00

## 2017-04-09 RX ADMIN — PIPERACILLIN AND TAZOBACTAM 3.38 G: 3; .375 INJECTION, POWDER, FOR SOLUTION INTRAVENOUS at 00:15

## 2017-04-09 RX ADMIN — ACETAMINOPHEN 650 MG: 325 TABLET ORAL at 14:08

## 2017-04-09 RX ADMIN — CITALOPRAM HYDROBROMIDE 40 MG: 20 TABLET ORAL at 09:55

## 2017-04-09 RX ADMIN — PIPERACILLIN AND TAZOBACTAM 3.38 G: 3; .375 INJECTION, POWDER, FOR SOLUTION INTRAVENOUS at 16:03

## 2017-04-09 RX ADMIN — METOPROLOL TARTRATE 25 MG: 25 TABLET, FILM COATED ORAL at 09:55

## 2017-04-09 RX ADMIN — ENOXAPARIN SODIUM 40 MG: 100 INJECTION SUBCUTANEOUS at 09:54

## 2017-04-09 RX ADMIN — BUPRENORPHINE AND NALOXONE 1 TABLET: 8; 2 TABLET SUBLINGUAL at 13:40

## 2017-04-09 ASSESSMENT — PAIN SCALES - GENERAL
PAINLEVEL_OUTOF10: 7
PAINLEVEL_OUTOF10: 3
PAINLEVEL_OUTOF10: 0

## 2017-04-10 LAB
BLOOD CULTURE, ROUTINE: NORMAL
CULTURE, BLOOD 2: NORMAL
EKG ATRIAL RATE: 82 BPM
EKG P AXIS: 67 DEGREES
EKG P-R INTERVAL: 128 MS
EKG Q-T INTERVAL: 424 MS
EKG QRS DURATION: 136 MS
EKG QTC CALCULATION (BAZETT): 495 MS
EKG R AXIS: 90 DEGREES
EKG T AXIS: 11 DEGREES
EKG VENTRICULAR RATE: 82 BPM

## 2017-04-11 LAB
ANAEROBIC CULTURE: NORMAL
ANAEROBIC CULTURE: NORMAL
GRAM STAIN RESULT: NORMAL
GRAM STAIN RESULT: NORMAL
WOUND/ABSCESS: NORMAL
WOUND/ABSCESS: NORMAL

## 2017-04-12 ENCOUNTER — HOSPITAL ENCOUNTER (EMERGENCY)
Age: 57
Discharge: HOME | End: 2017-04-12
Payer: COMMERCIAL

## 2017-04-12 VITALS
TEMPERATURE: 98.6 F | DIASTOLIC BLOOD PRESSURE: 83 MMHG | RESPIRATION RATE: 16 BRPM | BODY MASS INDEX: 23.25 KG/M2 | SYSTOLIC BLOOD PRESSURE: 155 MMHG | OXYGEN SATURATION: 98 % | HEART RATE: 88 BPM | HEIGHT: 69 IN | WEIGHT: 157 LBS

## 2017-04-12 DIAGNOSIS — Z48.89 ENCOUNTER FOR POSTOPERATIVE WOUND CHECK: Primary | ICD-10-CM

## 2017-04-12 LAB
ANION GAP SERPL CALCULATED.3IONS-SCNC: 10 MEQ/L (ref 7–13)
BASOPHILS ABSOLUTE: 0 K/UL (ref 0–0.2)
BASOPHILS RELATIVE PERCENT: 0.5 %
BUN BLDV-MCNC: 23 MG/DL (ref 6–20)
CALCIUM SERPL-MCNC: 8.8 MG/DL (ref 8.6–10.2)
CHLORIDE BLD-SCNC: 102 MEQ/L (ref 98–107)
CO2: 24 MEQ/L (ref 22–29)
CREAT SERPL-MCNC: 1.48 MG/DL (ref 0.7–1.2)
EOSINOPHILS ABSOLUTE: 0 K/UL (ref 0–0.7)
EOSINOPHILS RELATIVE PERCENT: 0.5 %
GFR AFRICAN AMERICAN: 59.1
GFR NON-AFRICAN AMERICAN: 48.9
GLUCOSE BLD-MCNC: 97 MG/DL (ref 74–109)
HCT VFR BLD CALC: 33.3 % (ref 42–52)
HEMOGLOBIN: 11.5 G/DL (ref 14–18)
LYMPHOCYTES ABSOLUTE: 1.7 K/UL (ref 1–4.8)
LYMPHOCYTES RELATIVE PERCENT: 23.5 %
MCH RBC QN AUTO: 30.5 PG (ref 27–31.3)
MCHC RBC AUTO-ENTMCNC: 34.6 % (ref 33–37)
MCV RBC AUTO: 88.1 FL (ref 80–100)
MONOCYTES ABSOLUTE: 0.5 K/UL (ref 0.2–0.8)
MONOCYTES RELATIVE PERCENT: 7.6 %
NEUTROPHILS ABSOLUTE: 4.8 K/UL (ref 1.4–6.5)
NEUTROPHILS RELATIVE PERCENT: 67.9 %
PDW BLD-RTO: 14.1 % (ref 11.5–14.5)
PLATELET # BLD: 255 K/UL (ref 130–400)
POTASSIUM SERPL-SCNC: 3.8 MEQ/L (ref 3.5–5.1)
RBC # BLD: 3.78 M/UL (ref 4.7–6.1)
SODIUM BLD-SCNC: 136 MEQ/L (ref 132–144)
WBC # BLD: 7.1 K/UL (ref 4.8–10.8)

## 2017-04-12 PROCEDURE — 85025 COMPLETE CBC W/AUTO DIFF WBC: CPT

## 2017-04-12 PROCEDURE — 99282 EMERGENCY DEPT VISIT SF MDM: CPT

## 2017-04-12 PROCEDURE — 36415 COLL VENOUS BLD VENIPUNCTURE: CPT

## 2017-04-12 PROCEDURE — 80048 BASIC METABOLIC PNL TOTAL CA: CPT

## 2017-04-12 RX ORDER — CEPHALEXIN 500 MG/1
500 CAPSULE ORAL 4 TIMES DAILY
Qty: 20 CAPSULE | Refills: 0 | Status: SHIPPED | OUTPATIENT
Start: 2017-04-12 | End: 2017-04-17

## 2017-04-12 ASSESSMENT — ENCOUNTER SYMPTOMS
VOMITING: 0
ANAL BLEEDING: 0
PHOTOPHOBIA: 0
EYE DISCHARGE: 0
ABDOMINAL DISTENTION: 0
APNEA: 0
NAUSEA: 0
VOICE CHANGE: 0

## 2017-04-12 ASSESSMENT — PAIN SCALES - GENERAL: PAINLEVEL_OUTOF10: 7

## 2017-04-12 ASSESSMENT — PAIN DESCRIPTION - LOCATION: LOCATION: ARM

## 2017-04-12 ASSESSMENT — PAIN DESCRIPTION - DESCRIPTORS: DESCRIPTORS: ACHING;CONSTANT

## 2017-04-14 LAB
HAV IGM SER IA-ACNC: ABNORMAL
HEPATITIS B CORE IGM ANTIBODY: ABNORMAL
HEPATITIS B SURFACE ANTIGEN INTERPRETATION: ABNORMAL
HEPATITIS C ANTIBODY INTERPRETATION: REACTIVE
HEPATITIS INTERPRETATION:: ABNORMAL

## 2017-05-27 ENCOUNTER — HOSPITAL ENCOUNTER (EMERGENCY)
Age: 57
Discharge: HOME | End: 2017-05-27
Attending: EMERGENCY MEDICINE
Payer: COMMERCIAL

## 2017-05-27 VITALS
RESPIRATION RATE: 18 BRPM | BODY MASS INDEX: 23.48 KG/M2 | WEIGHT: 159 LBS | DIASTOLIC BLOOD PRESSURE: 90 MMHG | OXYGEN SATURATION: 95 % | TEMPERATURE: 98.6 F | SYSTOLIC BLOOD PRESSURE: 143 MMHG | HEART RATE: 81 BPM

## 2017-05-27 DIAGNOSIS — K40.90 UNILATERAL INGUINAL HERNIA WITHOUT OBSTRUCTION OR GANGRENE, RECURRENCE NOT SPECIFIED: Primary | ICD-10-CM

## 2017-05-27 PROCEDURE — 99283 EMERGENCY DEPT VISIT LOW MDM: CPT

## 2017-05-27 PROCEDURE — 6360000002 HC RX W HCPCS: Performed by: EMERGENCY MEDICINE

## 2017-05-27 PROCEDURE — 96372 THER/PROPH/DIAG INJ SC/IM: CPT

## 2017-05-27 RX ORDER — TRAMADOL HYDROCHLORIDE 50 MG/1
50 TABLET ORAL EVERY 6 HOURS PRN
Qty: 20 TABLET | Refills: 0 | Status: SHIPPED | OUTPATIENT
Start: 2017-05-27 | End: 2017-07-01 | Stop reason: HOSPADM

## 2017-05-27 RX ORDER — KETOROLAC TROMETHAMINE 30 MG/ML
15 INJECTION, SOLUTION INTRAMUSCULAR; INTRAVENOUS ONCE
Status: COMPLETED | OUTPATIENT
Start: 2017-05-27 | End: 2017-05-27

## 2017-05-27 RX ADMIN — KETOROLAC TROMETHAMINE 15 MG: 30 INJECTION, SOLUTION INTRAMUSCULAR at 19:47

## 2017-05-27 ASSESSMENT — PAIN DESCRIPTION - PAIN TYPE: TYPE: ACUTE PAIN

## 2017-05-27 ASSESSMENT — PAIN DESCRIPTION - LOCATION: LOCATION: SCROTUM

## 2017-05-27 ASSESSMENT — ENCOUNTER SYMPTOMS
COUGH: 0
SHORTNESS OF BREATH: 0
BACK PAIN: 0
ABDOMINAL PAIN: 0
SORE THROAT: 0
NAUSEA: 0
VOMITING: 0
DIARRHEA: 0

## 2017-05-27 ASSESSMENT — PAIN DESCRIPTION - ORIENTATION: ORIENTATION: RIGHT

## 2017-05-27 ASSESSMENT — PAIN SCALES - GENERAL
PAINLEVEL_OUTOF10: 7
PAINLEVEL_OUTOF10: 6

## 2017-05-27 ASSESSMENT — PAIN DESCRIPTION - FREQUENCY: FREQUENCY: CONTINUOUS

## 2017-05-27 ASSESSMENT — PAIN DESCRIPTION - DESCRIPTORS: DESCRIPTORS: SQUEEZING

## 2017-05-27 ASSESSMENT — PAIN DESCRIPTION - ONSET: ONSET: SUDDEN

## 2017-06-06 ENCOUNTER — APPOINTMENT (OUTPATIENT)
Dept: GENERAL RADIOLOGY | Age: 57
DRG: 058 | End: 2017-06-06
Payer: COMMERCIAL

## 2017-06-06 ENCOUNTER — APPOINTMENT (OUTPATIENT)
Dept: CT IMAGING | Age: 57
DRG: 058 | End: 2017-06-06
Payer: COMMERCIAL

## 2017-06-06 ENCOUNTER — HOSPITAL ENCOUNTER (INPATIENT)
Age: 57
LOS: 1 days | Discharge: NURSING, REHAB OR LONG-TERM CARE | DRG: 058 | End: 2017-06-08
Attending: EMERGENCY MEDICINE | Admitting: INTERNAL MEDICINE
Payer: COMMERCIAL

## 2017-06-06 DIAGNOSIS — G81.90 HEMIPARESIS (HCC): ICD-10-CM

## 2017-06-06 DIAGNOSIS — D72.829 LEUKOCYTOSIS, UNSPECIFIED TYPE: ICD-10-CM

## 2017-06-06 DIAGNOSIS — I63.9 CEREBROVASCULAR ACCIDENT (CVA), UNSPECIFIED MECHANISM (HCC): Primary | ICD-10-CM

## 2017-06-06 LAB
ALBUMIN SERPL-MCNC: 3.9 G/DL (ref 3.9–4.9)
ALP BLD-CCNC: 69 U/L (ref 35–104)
ALT SERPL-CCNC: 24 U/L (ref 0–41)
AMPHETAMINE SCREEN, URINE: ABNORMAL
ANION GAP SERPL CALCULATED.3IONS-SCNC: 13 MEQ/L (ref 7–13)
APTT: 28.7 SEC (ref 21.6–35.4)
AST SERPL-CCNC: 28 U/L (ref 0–40)
BANDED NEUTROPHILS RELATIVE PERCENT: 20 %
BARBITURATE SCREEN URINE: ABNORMAL
BASOPHILS ABSOLUTE: 0 K/UL (ref 0–0.2)
BASOPHILS RELATIVE PERCENT: 0 %
BENZODIAZEPINE SCREEN, URINE: ABNORMAL
BILIRUB SERPL-MCNC: 0.8 MG/DL (ref 0–1.2)
BILIRUBIN URINE: ABNORMAL
BLOOD, URINE: NEGATIVE
BUN BLDV-MCNC: 27 MG/DL (ref 6–20)
CALCIUM SERPL-MCNC: 8.7 MG/DL (ref 8.6–10.2)
CANNABINOID SCREEN URINE: ABNORMAL
CHLORIDE BLD-SCNC: 93 MEQ/L (ref 98–107)
CK MB: 5.6 NG/ML (ref 0–6.7)
CLARITY: ABNORMAL
CO2: 24 MEQ/L (ref 22–29)
COCAINE METABOLITE SCREEN URINE: POSITIVE
COLOR: YELLOW
CREAT SERPL-MCNC: 1.64 MG/DL (ref 0.7–1.2)
CREATINE KINASE-MB INDEX: 1.2 % (ref 0–3.5)
EOSINOPHILS ABSOLUTE: 0 K/UL (ref 0–0.7)
EOSINOPHILS RELATIVE PERCENT: 0 %
ETHANOL PERCENT: NORMAL G/DL
ETHANOL: <10 MG/DL (ref 0–0.08)
GFR AFRICAN AMERICAN: 52.5
GFR NON-AFRICAN AMERICAN: 43.4
GLOBULIN: 2.9 G/DL (ref 2.3–3.5)
GLUCOSE BLD-MCNC: 171 MG/DL (ref 74–109)
GLUCOSE URINE: NEGATIVE MG/DL
HCT VFR BLD CALC: 39 % (ref 42–52)
HEMOGLOBIN: 13.1 G/DL (ref 14–18)
INR BLD: 1.1
KETONES, URINE: NEGATIVE MG/DL
LACTIC ACID: 1.4 MMOL/L (ref 0.5–2.2)
LEUKOCYTE ESTERASE, URINE: NEGATIVE
LYMPHOCYTES ABSOLUTE: 0.7 K/UL (ref 1–4.8)
LYMPHOCYTES RELATIVE PERCENT: 3 %
Lab: ABNORMAL
MAGNESIUM: 2 MG/DL (ref 1.7–2.3)
MCH RBC QN AUTO: 30.3 PG (ref 27–31.3)
MCHC RBC AUTO-ENTMCNC: 33.7 % (ref 33–37)
MCV RBC AUTO: 89.9 FL (ref 80–100)
MONOCYTES ABSOLUTE: 0 K/UL (ref 0.2–0.8)
MONOCYTES RELATIVE PERCENT: 2 %
NEUTROPHILS ABSOLUTE: 22 K/UL (ref 1.4–6.5)
NEUTROPHILS RELATIVE PERCENT: 77 %
NITRITE, URINE: NEGATIVE
OPIATE SCREEN URINE: POSITIVE
PDW BLD-RTO: 14.6 % (ref 11.5–14.5)
PH UA: 5.5 (ref 5–9)
PHENCYCLIDINE SCREEN URINE: ABNORMAL
PLATELET # BLD: 127 K/UL (ref 130–400)
PLATELET SLIDE REVIEW: NORMAL
POTASSIUM SERPL-SCNC: 4.2 MEQ/L (ref 3.5–5.1)
PROTEIN UA: ABNORMAL MG/DL
PROTHROMBIN TIME: 11.9 SEC (ref 8.1–13.7)
RBC # BLD: 4.34 M/UL (ref 4.7–6.1)
RBC # BLD: NORMAL 10*6/UL
SODIUM BLD-SCNC: 130 MEQ/L (ref 132–144)
SPECIFIC GRAVITY UA: 1.01 (ref 1–1.03)
TOTAL CK: 465 U/L (ref 0–190)
TOTAL PROTEIN: 6.8 G/DL (ref 6.4–8.1)
TROPONIN: <0.01 NG/ML (ref 0–0.01)
URINE REFLEX TO CULTURE: ABNORMAL
UROBILINOGEN, URINE: 1 E.U./DL
WBC # BLD: 22.7 K/UL (ref 4.8–10.8)

## 2017-06-06 PROCEDURE — 93005 ELECTROCARDIOGRAM TRACING: CPT

## 2017-06-06 PROCEDURE — 85730 THROMBOPLASTIN TIME PARTIAL: CPT

## 2017-06-06 PROCEDURE — 70450 CT HEAD/BRAIN W/O DYE: CPT

## 2017-06-06 PROCEDURE — 81003 URINALYSIS AUTO W/O SCOPE: CPT

## 2017-06-06 PROCEDURE — G0480 DRUG TEST DEF 1-7 CLASSES: HCPCS

## 2017-06-06 PROCEDURE — 2580000003 HC RX 258: Performed by: PHYSICIAN ASSISTANT

## 2017-06-06 PROCEDURE — 80053 COMPREHEN METABOLIC PANEL: CPT

## 2017-06-06 PROCEDURE — 84484 ASSAY OF TROPONIN QUANT: CPT

## 2017-06-06 PROCEDURE — 99285 EMERGENCY DEPT VISIT HI MDM: CPT

## 2017-06-06 PROCEDURE — 36415 COLL VENOUS BLD VENIPUNCTURE: CPT

## 2017-06-06 PROCEDURE — 83605 ASSAY OF LACTIC ACID: CPT

## 2017-06-06 PROCEDURE — 71010 XR CHEST PORTABLE: CPT

## 2017-06-06 PROCEDURE — 85025 COMPLETE CBC W/AUTO DIFF WBC: CPT

## 2017-06-06 PROCEDURE — 85610 PROTHROMBIN TIME: CPT

## 2017-06-06 PROCEDURE — 87040 BLOOD CULTURE FOR BACTERIA: CPT

## 2017-06-06 PROCEDURE — 83735 ASSAY OF MAGNESIUM: CPT

## 2017-06-06 PROCEDURE — 80307 DRUG TEST PRSMV CHEM ANLYZR: CPT

## 2017-06-06 PROCEDURE — 6360000002 HC RX W HCPCS: Performed by: PHYSICIAN ASSISTANT

## 2017-06-06 PROCEDURE — 82550 ASSAY OF CK (CPK): CPT

## 2017-06-06 PROCEDURE — 96365 THER/PROPH/DIAG IV INF INIT: CPT

## 2017-06-06 PROCEDURE — 82553 CREATINE MB FRACTION: CPT

## 2017-06-06 PROCEDURE — 6370000000 HC RX 637 (ALT 250 FOR IP): Performed by: PHYSICIAN ASSISTANT

## 2017-06-06 RX ORDER — 0.9 % SODIUM CHLORIDE 0.9 %
1000 INTRAVENOUS SOLUTION INTRAVENOUS ONCE
Status: COMPLETED | OUTPATIENT
Start: 2017-06-06 | End: 2017-06-07

## 2017-06-06 RX ORDER — ASPIRIN 325 MG
325 TABLET ORAL ONCE
Status: COMPLETED | OUTPATIENT
Start: 2017-06-06 | End: 2017-06-06

## 2017-06-06 RX ADMIN — SODIUM CHLORIDE 1000 ML: 9 INJECTION, SOLUTION INTRAVENOUS at 23:42

## 2017-06-06 RX ADMIN — CEFTRIAXONE 1 G: 1 INJECTION, POWDER, FOR SOLUTION INTRAMUSCULAR; INTRAVENOUS at 23:42

## 2017-06-06 RX ADMIN — ASPIRIN 325 MG ORAL TABLET 325 MG: 325 PILL ORAL at 23:42

## 2017-06-06 ASSESSMENT — ENCOUNTER SYMPTOMS
PHOTOPHOBIA: 0
EYE DISCHARGE: 0
ABDOMINAL DISTENTION: 0
COUGH: 0
VOICE CHANGE: 0
ANAL BLEEDING: 0
APNEA: 0
ABDOMINAL PAIN: 1

## 2017-06-06 ASSESSMENT — PAIN SCALES - GENERAL: PAINLEVEL_OUTOF10: 10

## 2017-06-06 ASSESSMENT — PAIN DESCRIPTION - PAIN TYPE: TYPE: ACUTE PAIN

## 2017-06-06 ASSESSMENT — PAIN DESCRIPTION - LOCATION: LOCATION: HEAD

## 2017-06-07 ENCOUNTER — APPOINTMENT (OUTPATIENT)
Dept: MRI IMAGING | Age: 57
DRG: 058 | End: 2017-06-07
Payer: COMMERCIAL

## 2017-06-07 ENCOUNTER — APPOINTMENT (OUTPATIENT)
Dept: ULTRASOUND IMAGING | Age: 57
DRG: 058 | End: 2017-06-07
Payer: COMMERCIAL

## 2017-06-07 PROBLEM — I63.9 CVA (CEREBRAL VASCULAR ACCIDENT) (HCC): Status: ACTIVE | Noted: 2017-06-07

## 2017-06-07 PROCEDURE — 93880 EXTRACRANIAL BILAT STUDY: CPT

## 2017-06-07 PROCEDURE — G8987 SELF CARE CURRENT STATUS: HCPCS

## 2017-06-07 PROCEDURE — 1210000000 HC MED SURG R&B

## 2017-06-07 PROCEDURE — 94664 DEMO&/EVAL PT USE INHALER: CPT

## 2017-06-07 PROCEDURE — 97167 OT EVAL HIGH COMPLEX 60 MIN: CPT

## 2017-06-07 PROCEDURE — 70544 MR ANGIOGRAPHY HEAD W/O DYE: CPT

## 2017-06-07 PROCEDURE — G8988 SELF CARE GOAL STATUS: HCPCS

## 2017-06-07 PROCEDURE — G8979 MOBILITY GOAL STATUS: HCPCS

## 2017-06-07 PROCEDURE — 6360000002 HC RX W HCPCS: Performed by: PSYCHIATRY & NEUROLOGY

## 2017-06-07 PROCEDURE — 6370000000 HC RX 637 (ALT 250 FOR IP): Performed by: INTERNAL MEDICINE

## 2017-06-07 PROCEDURE — 97163 PT EVAL HIGH COMPLEX 45 MIN: CPT

## 2017-06-07 PROCEDURE — 2580000003 HC RX 258: Performed by: INTERNAL MEDICINE

## 2017-06-07 PROCEDURE — 70551 MRI BRAIN STEM W/O DYE: CPT

## 2017-06-07 PROCEDURE — G8978 MOBILITY CURRENT STATUS: HCPCS

## 2017-06-07 PROCEDURE — 6360000002 HC RX W HCPCS: Performed by: INTERNAL MEDICINE

## 2017-06-07 RX ORDER — ORPHENADRINE CITRATE 100 MG/1
100 TABLET, EXTENDED RELEASE ORAL 2 TIMES DAILY
Status: ON HOLD | COMMUNITY
End: 2017-06-30 | Stop reason: HOSPADM

## 2017-06-07 RX ORDER — LISINOPRIL 10 MG/1
10 TABLET ORAL DAILY
Status: DISCONTINUED | OUTPATIENT
Start: 2017-06-07 | End: 2017-06-08 | Stop reason: HOSPADM

## 2017-06-07 RX ORDER — ACETAMINOPHEN 325 MG/1
650 TABLET ORAL EVERY 4 HOURS PRN
Status: DISCONTINUED | OUTPATIENT
Start: 2017-06-07 | End: 2017-06-08 | Stop reason: HOSPADM

## 2017-06-07 RX ORDER — BUPRENORPHINE HYDROCHLORIDE AND NALOXONE HYDROCHLORIDE DIHYDRATE 8; 2 MG/1; MG/1
1 TABLET SUBLINGUAL 3 TIMES DAILY
Status: DISCONTINUED | OUTPATIENT
Start: 2017-06-07 | End: 2017-06-08 | Stop reason: HOSPADM

## 2017-06-07 RX ORDER — CITALOPRAM 20 MG/1
40 TABLET ORAL DAILY
Status: DISCONTINUED | OUTPATIENT
Start: 2017-06-07 | End: 2017-06-08 | Stop reason: HOSPADM

## 2017-06-07 RX ORDER — ASPIRIN 81 MG/1
81 TABLET ORAL DAILY
Status: DISCONTINUED | OUTPATIENT
Start: 2017-06-07 | End: 2017-06-08 | Stop reason: HOSPADM

## 2017-06-07 RX ORDER — ATORVASTATIN CALCIUM 80 MG/1
80 TABLET, FILM COATED ORAL NIGHTLY
Status: ON HOLD | COMMUNITY
End: 2017-06-30 | Stop reason: HOSPADM

## 2017-06-07 RX ORDER — ACETAMINOPHEN 160 MG
2000 TABLET,DISINTEGRATING ORAL DAILY
COMMUNITY

## 2017-06-07 RX ORDER — ALBUTEROL SULFATE 90 UG/1
2 AEROSOL, METERED RESPIRATORY (INHALATION) EVERY 6 HOURS PRN
Status: DISCONTINUED | OUTPATIENT
Start: 2017-06-07 | End: 2017-06-08 | Stop reason: HOSPADM

## 2017-06-07 RX ORDER — LORAZEPAM 2 MG/ML
1 INJECTION INTRAMUSCULAR
Status: COMPLETED | OUTPATIENT
Start: 2017-06-07 | End: 2017-06-07

## 2017-06-07 RX ORDER — TRAMADOL HYDROCHLORIDE 50 MG/1
50 TABLET ORAL EVERY 6 HOURS PRN
Status: DISCONTINUED | OUTPATIENT
Start: 2017-06-07 | End: 2017-06-08 | Stop reason: HOSPADM

## 2017-06-07 RX ORDER — QUETIAPINE 300 MG/1
300 TABLET, FILM COATED, EXTENDED RELEASE ORAL NIGHTLY
Status: DISCONTINUED | OUTPATIENT
Start: 2017-06-07 | End: 2017-06-08 | Stop reason: HOSPADM

## 2017-06-07 RX ORDER — SODIUM CHLORIDE 0.9 % (FLUSH) 0.9 %
10 SYRINGE (ML) INJECTION EVERY 12 HOURS SCHEDULED
Status: DISCONTINUED | OUTPATIENT
Start: 2017-06-07 | End: 2017-06-08 | Stop reason: HOSPADM

## 2017-06-07 RX ORDER — ATORVASTATIN CALCIUM 20 MG/1
20 TABLET, FILM COATED ORAL NIGHTLY
Status: DISCONTINUED | OUTPATIENT
Start: 2017-06-07 | End: 2017-06-08 | Stop reason: HOSPADM

## 2017-06-07 RX ORDER — CLOPIDOGREL BISULFATE 75 MG/1
75 TABLET ORAL DAILY
COMMUNITY

## 2017-06-07 RX ORDER — ONDANSETRON 2 MG/ML
4 INJECTION INTRAMUSCULAR; INTRAVENOUS EVERY 6 HOURS PRN
Status: DISCONTINUED | OUTPATIENT
Start: 2017-06-07 | End: 2017-06-08 | Stop reason: HOSPADM

## 2017-06-07 RX ORDER — SODIUM CHLORIDE 0.9 % (FLUSH) 0.9 %
10 SYRINGE (ML) INJECTION PRN
Status: DISCONTINUED | OUTPATIENT
Start: 2017-06-07 | End: 2017-06-08 | Stop reason: HOSPADM

## 2017-06-07 RX ADMIN — CITALOPRAM HYDROBROMIDE 40 MG: 20 TABLET ORAL at 09:57

## 2017-06-07 RX ADMIN — BUPRENORPHINE AND NALOXONE 1 TABLET: 8; 2 TABLET SUBLINGUAL at 09:57

## 2017-06-07 RX ADMIN — SODIUM CHLORIDE, PRESERVATIVE FREE 10 ML: 5 INJECTION INTRAVENOUS at 21:46

## 2017-06-07 RX ADMIN — BUPRENORPHINE AND NALOXONE 1 TABLET: 8; 2 TABLET SUBLINGUAL at 15:54

## 2017-06-07 RX ADMIN — METOPROLOL TARTRATE 25 MG: 25 TABLET ORAL at 21:30

## 2017-06-07 RX ADMIN — LORAZEPAM 1 MG: 2 INJECTION INTRAMUSCULAR; INTRAVENOUS at 13:23

## 2017-06-07 RX ADMIN — ACETAMINOPHEN 650 MG: 325 TABLET ORAL at 13:08

## 2017-06-07 RX ADMIN — BUPRENORPHINE AND NALOXONE 1 TABLET: 8; 2 TABLET SUBLINGUAL at 21:30

## 2017-06-07 RX ADMIN — ATORVASTATIN CALCIUM 20 MG: 20 TABLET, FILM COATED ORAL at 21:30

## 2017-06-07 RX ADMIN — TRAMADOL HYDROCHLORIDE 50 MG: 50 TABLET, FILM COATED ORAL at 17:45

## 2017-06-07 RX ADMIN — LISINOPRIL 10 MG: 10 TABLET ORAL at 09:57

## 2017-06-07 RX ADMIN — METOPROLOL TARTRATE 25 MG: 25 TABLET ORAL at 09:57

## 2017-06-07 RX ADMIN — ENOXAPARIN SODIUM 40 MG: 40 INJECTION, SOLUTION INTRAVENOUS; SUBCUTANEOUS at 09:57

## 2017-06-07 RX ADMIN — ASPIRIN 81 MG: 81 TABLET, COATED ORAL at 09:57

## 2017-06-07 RX ADMIN — SODIUM CHLORIDE, PRESERVATIVE FREE 10 ML: 5 INJECTION INTRAVENOUS at 13:09

## 2017-06-07 RX ADMIN — QUETIAPINE 300 MG: 300 TABLET, EXTENDED RELEASE ORAL at 21:30

## 2017-06-07 ASSESSMENT — PAIN SCALES - GENERAL
PAINLEVEL_OUTOF10: 8
PAINLEVEL_OUTOF10: 7
PAINLEVEL_OUTOF10: 6
PAINLEVEL_OUTOF10: 7
PAINLEVEL_OUTOF10: 7
PAINLEVEL_OUTOF10: 6

## 2017-06-07 ASSESSMENT — PAIN DESCRIPTION - PAIN TYPE: TYPE: ACUTE PAIN

## 2017-06-07 ASSESSMENT — ENCOUNTER SYMPTOMS
COUGH: 0
VOMITING: 0
NAUSEA: 0
DIARRHEA: 0
SHORTNESS OF BREATH: 0

## 2017-06-07 ASSESSMENT — PAIN DESCRIPTION - LOCATION: LOCATION: HEAD

## 2017-06-07 ASSESSMENT — PAIN DESCRIPTION - DESCRIPTORS: DESCRIPTORS: SHARP

## 2017-06-08 ENCOUNTER — HOSPITAL ENCOUNTER (INPATIENT)
Age: 57
LOS: 23 days | Discharge: HOME HEALTH | DRG: 058 | End: 2017-07-01
Attending: PHYSICAL MEDICINE & REHABILITATION | Admitting: PHYSICAL MEDICINE & REHABILITATION
Payer: COMMERCIAL

## 2017-06-08 VITALS
SYSTOLIC BLOOD PRESSURE: 139 MMHG | HEIGHT: 69 IN | OXYGEN SATURATION: 98 % | BODY MASS INDEX: 21.84 KG/M2 | RESPIRATION RATE: 17 BRPM | TEMPERATURE: 98.4 F | HEART RATE: 67 BPM | DIASTOLIC BLOOD PRESSURE: 73 MMHG | WEIGHT: 147.49 LBS

## 2017-06-08 DIAGNOSIS — B18.2 CHRONIC HEPATITIS C WITHOUT HEPATIC COMA (HCC): Primary | ICD-10-CM

## 2017-06-08 PROBLEM — I10 BP (HIGH BLOOD PRESSURE): Status: ACTIVE | Noted: 2017-06-08

## 2017-06-08 PROBLEM — F31.9 BIPOLAR AFFECTIVE DISORDER (HCC): Status: ACTIVE | Noted: 2017-06-08

## 2017-06-08 PROBLEM — B19.20 HEPATITIS C VIRUS INFECTION: Status: ACTIVE | Noted: 2017-06-08

## 2017-06-08 LAB
BILIRUBIN URINE: NEGATIVE
BLOOD, URINE: NEGATIVE
CHOLESTEROL, TOTAL: 65 MG/DL (ref 0–199)
CLARITY: CLEAR
COLOR: YELLOW
GLUCOSE URINE: NEGATIVE MG/DL
HDLC SERPL-MCNC: 25 MG/DL (ref 40–59)
KETONES, URINE: NEGATIVE MG/DL
LDL CHOLESTEROL CALCULATED: 31 MG/DL (ref 0–129)
LEUKOCYTE ESTERASE, URINE: NEGATIVE
NITRITE, URINE: NEGATIVE
PH UA: 5 (ref 5–9)
PROTEIN UA: NEGATIVE MG/DL
SPECIFIC GRAVITY UA: 1.02 (ref 1–1.03)
TRIGL SERPL-MCNC: 45 MG/DL (ref 0–200)
UROBILINOGEN, URINE: 0.2 E.U./DL

## 2017-06-08 PROCEDURE — 95816 EEG AWAKE AND DROWSY: CPT

## 2017-06-08 PROCEDURE — 36415 COLL VENOUS BLD VENIPUNCTURE: CPT

## 2017-06-08 PROCEDURE — 6360000002 HC RX W HCPCS: Performed by: INTERNAL MEDICINE

## 2017-06-08 PROCEDURE — 80061 LIPID PANEL: CPT

## 2017-06-08 PROCEDURE — 94664 DEMO&/EVAL PT USE INHALER: CPT

## 2017-06-08 PROCEDURE — 6370000000 HC RX 637 (ALT 250 FOR IP): Performed by: PHYSICIAN ASSISTANT

## 2017-06-08 PROCEDURE — 1180000000 HC REHAB R&B

## 2017-06-08 PROCEDURE — 81003 URINALYSIS AUTO W/O SCOPE: CPT

## 2017-06-08 PROCEDURE — 97535 SELF CARE MNGMENT TRAINING: CPT

## 2017-06-08 PROCEDURE — 6370000000 HC RX 637 (ALT 250 FOR IP): Performed by: INTERNAL MEDICINE

## 2017-06-08 RX ORDER — ALBUTEROL SULFATE 90 UG/1
2 AEROSOL, METERED RESPIRATORY (INHALATION) EVERY 6 HOURS PRN
Status: CANCELLED | OUTPATIENT
Start: 2017-06-08

## 2017-06-08 RX ORDER — ACETAMINOPHEN 325 MG/1
650 TABLET ORAL EVERY 4 HOURS PRN
Status: CANCELLED | OUTPATIENT
Start: 2017-06-08

## 2017-06-08 RX ORDER — ONDANSETRON 2 MG/ML
4 INJECTION INTRAMUSCULAR; INTRAVENOUS EVERY 6 HOURS PRN
Status: CANCELLED | OUTPATIENT
Start: 2017-06-08

## 2017-06-08 RX ORDER — BUPRENORPHINE HYDROCHLORIDE AND NALOXONE HYDROCHLORIDE DIHYDRATE 8; 2 MG/1; MG/1
1.5 TABLET SUBLINGUAL DAILY
Status: DISCONTINUED | OUTPATIENT
Start: 2017-06-09 | End: 2017-07-01 | Stop reason: HOSPADM

## 2017-06-08 RX ORDER — ASPIRIN 81 MG/1
81 TABLET ORAL DAILY
Status: DISCONTINUED | OUTPATIENT
Start: 2017-06-09 | End: 2017-07-01 | Stop reason: HOSPADM

## 2017-06-08 RX ORDER — TRAMADOL HYDROCHLORIDE 50 MG/1
50 TABLET ORAL EVERY 6 HOURS PRN
Status: CANCELLED | OUTPATIENT
Start: 2017-06-08

## 2017-06-08 RX ORDER — CITALOPRAM 20 MG/1
40 TABLET ORAL DAILY
Status: CANCELLED | OUTPATIENT
Start: 2017-06-09

## 2017-06-08 RX ORDER — ALBUTEROL SULFATE 90 UG/1
2 AEROSOL, METERED RESPIRATORY (INHALATION) EVERY 4 HOURS PRN
Status: DISCONTINUED | OUTPATIENT
Start: 2017-06-08 | End: 2017-07-01 | Stop reason: HOSPADM

## 2017-06-08 RX ORDER — QUETIAPINE 300 MG/1
300 TABLET, FILM COATED, EXTENDED RELEASE ORAL NIGHTLY
Status: CANCELLED | OUTPATIENT
Start: 2017-06-08

## 2017-06-08 RX ORDER — CITALOPRAM 20 MG/1
40 TABLET ORAL DAILY
Status: DISCONTINUED | OUTPATIENT
Start: 2017-06-09 | End: 2017-06-29

## 2017-06-08 RX ORDER — QUETIAPINE 300 MG/1
300 TABLET, FILM COATED, EXTENDED RELEASE ORAL NIGHTLY
Status: DISCONTINUED | OUTPATIENT
Start: 2017-06-08 | End: 2017-07-01 | Stop reason: HOSPADM

## 2017-06-08 RX ORDER — ALBUTEROL SULFATE 90 UG/1
2 AEROSOL, METERED RESPIRATORY (INHALATION) EVERY 6 HOURS PRN
Status: DISCONTINUED | OUTPATIENT
Start: 2017-06-08 | End: 2017-06-08

## 2017-06-08 RX ORDER — ACETAMINOPHEN 325 MG/1
650 TABLET ORAL EVERY 4 HOURS PRN
Status: DISCONTINUED | OUTPATIENT
Start: 2017-06-08 | End: 2017-06-30

## 2017-06-08 RX ORDER — LISINOPRIL 10 MG/1
10 TABLET ORAL DAILY
Status: CANCELLED | OUTPATIENT
Start: 2017-06-09

## 2017-06-08 RX ORDER — TRAMADOL HYDROCHLORIDE 50 MG/1
50 TABLET ORAL EVERY 6 HOURS PRN
Status: DISCONTINUED | OUTPATIENT
Start: 2017-06-08 | End: 2017-06-20

## 2017-06-08 RX ORDER — LISINOPRIL 10 MG/1
10 TABLET ORAL DAILY
Status: DISCONTINUED | OUTPATIENT
Start: 2017-06-09 | End: 2017-06-27

## 2017-06-08 RX ORDER — ONDANSETRON 2 MG/ML
4 INJECTION INTRAMUSCULAR; INTRAVENOUS EVERY 6 HOURS PRN
Status: DISCONTINUED | OUTPATIENT
Start: 2017-06-08 | End: 2017-06-08 | Stop reason: ALTCHOICE

## 2017-06-08 RX ORDER — ASPIRIN 81 MG/1
81 TABLET ORAL DAILY
Status: CANCELLED | OUTPATIENT
Start: 2017-06-09

## 2017-06-08 RX ORDER — ATORVASTATIN CALCIUM 20 MG/1
20 TABLET, FILM COATED ORAL NIGHTLY
Status: CANCELLED | OUTPATIENT
Start: 2017-06-08

## 2017-06-08 RX ORDER — BUPRENORPHINE HYDROCHLORIDE AND NALOXONE HYDROCHLORIDE DIHYDRATE 8; 2 MG/1; MG/1
1 TABLET SUBLINGUAL 3 TIMES DAILY
Status: CANCELLED | OUTPATIENT
Start: 2017-06-08

## 2017-06-08 RX ORDER — ATORVASTATIN CALCIUM 20 MG/1
20 TABLET, FILM COATED ORAL NIGHTLY
Status: DISCONTINUED | OUTPATIENT
Start: 2017-06-08 | End: 2017-07-01 | Stop reason: HOSPADM

## 2017-06-08 RX ADMIN — METOPROLOL TARTRATE 25 MG: 25 TABLET ORAL at 10:26

## 2017-06-08 RX ADMIN — CITALOPRAM HYDROBROMIDE 40 MG: 20 TABLET ORAL at 10:26

## 2017-06-08 RX ADMIN — LISINOPRIL 10 MG: 10 TABLET ORAL at 10:25

## 2017-06-08 RX ADMIN — ATORVASTATIN CALCIUM 20 MG: 20 TABLET, FILM COATED ORAL at 21:32

## 2017-06-08 RX ADMIN — QUETIAPINE FUMARATE 300 MG: 300 TABLET, EXTENDED RELEASE ORAL at 22:07

## 2017-06-08 RX ADMIN — ACETAMINOPHEN 650 MG: 325 TABLET ORAL at 15:40

## 2017-06-08 RX ADMIN — ENOXAPARIN SODIUM 40 MG: 40 INJECTION, SOLUTION INTRAVENOUS; SUBCUTANEOUS at 10:26

## 2017-06-08 RX ADMIN — ASPIRIN 81 MG: 81 TABLET, COATED ORAL at 10:26

## 2017-06-08 RX ADMIN — BUPRENORPHINE AND NALOXONE 1 TABLET: 8; 2 TABLET SUBLINGUAL at 10:26

## 2017-06-08 RX ADMIN — BUPRENORPHINE AND NALOXONE 1 TABLET: 8; 2 TABLET SUBLINGUAL at 14:09

## 2017-06-08 RX ADMIN — METOPROLOL TARTRATE 25 MG: 25 TABLET, FILM COATED ORAL at 21:30

## 2017-06-08 ASSESSMENT — PAIN SCALES - GENERAL
PAINLEVEL_OUTOF10: 6
PAINLEVEL_OUTOF10: 7
PAINLEVEL_OUTOF10: 8
PAINLEVEL_OUTOF10: 7

## 2017-06-08 ASSESSMENT — PAIN DESCRIPTION - PAIN TYPE
TYPE: CHRONIC PAIN
TYPE: ACUTE PAIN

## 2017-06-08 ASSESSMENT — PAIN DESCRIPTION - LOCATION
LOCATION: HEAD
LOCATION: BACK

## 2017-06-08 ASSESSMENT — PAIN DESCRIPTION - ORIENTATION: ORIENTATION: LOWER

## 2017-06-08 ASSESSMENT — PAIN DESCRIPTION - FREQUENCY: FREQUENCY: INTERMITTENT

## 2017-06-08 ASSESSMENT — PAIN DESCRIPTION - ONSET: ONSET: GRADUAL

## 2017-06-08 ASSESSMENT — PAIN DESCRIPTION - DESCRIPTORS: DESCRIPTORS: SHARP

## 2017-06-09 LAB
ANION GAP SERPL CALCULATED.3IONS-SCNC: 13 MEQ/L (ref 7–13)
BUN BLDV-MCNC: 29 MG/DL (ref 6–20)
CALCIUM SERPL-MCNC: 8.4 MG/DL (ref 8.6–10.2)
CHLORIDE BLD-SCNC: 107 MEQ/L (ref 98–107)
CO2: 21 MEQ/L (ref 22–29)
CREAT SERPL-MCNC: 1.31 MG/DL (ref 0.7–1.2)
GFR AFRICAN AMERICAN: >60
GFR NON-AFRICAN AMERICAN: 56.2
GLUCOSE BLD-MCNC: 123 MG/DL (ref 74–109)
POTASSIUM SERPL-SCNC: 4.1 MEQ/L (ref 3.5–5.1)
SODIUM BLD-SCNC: 141 MEQ/L (ref 132–144)

## 2017-06-09 PROCEDURE — 97110 THERAPEUTIC EXERCISES: CPT

## 2017-06-09 PROCEDURE — 97116 GAIT TRAINING THERAPY: CPT

## 2017-06-09 PROCEDURE — 92610 EVALUATE SWALLOWING FUNCTION: CPT

## 2017-06-09 PROCEDURE — 6370000000 HC RX 637 (ALT 250 FOR IP): Performed by: PHYSICIAN ASSISTANT

## 2017-06-09 PROCEDURE — 97167 OT EVAL HIGH COMPLEX 60 MIN: CPT

## 2017-06-09 PROCEDURE — 97163 PT EVAL HIGH COMPLEX 45 MIN: CPT

## 2017-06-09 PROCEDURE — 96125 COGNITIVE TEST BY HC PRO: CPT

## 2017-06-09 PROCEDURE — 92523 SPEECH SOUND LANG COMPREHEN: CPT

## 2017-06-09 PROCEDURE — 97532 HC OT DEVELOP COGNITIVE SKILLS 15MIN: CPT

## 2017-06-09 PROCEDURE — 99223 1ST HOSP IP/OBS HIGH 75: CPT | Performed by: PHYSICAL MEDICINE & REHABILITATION

## 2017-06-09 PROCEDURE — 6360000002 HC RX W HCPCS: Performed by: PHYSICIAN ASSISTANT

## 2017-06-09 PROCEDURE — 6370000000 HC RX 637 (ALT 250 FOR IP): Performed by: PHYSICAL MEDICINE & REHABILITATION

## 2017-06-09 PROCEDURE — 97535 SELF CARE MNGMENT TRAINING: CPT

## 2017-06-09 PROCEDURE — 6370000000 HC RX 637 (ALT 250 FOR IP): Performed by: CLINICAL NURSE SPECIALIST

## 2017-06-09 PROCEDURE — 80048 BASIC METABOLIC PNL TOTAL CA: CPT

## 2017-06-09 PROCEDURE — 99253 IP/OBS CNSLTJ NEW/EST LOW 45: CPT | Performed by: CLINICAL NURSE SPECIALIST

## 2017-06-09 PROCEDURE — 87086 URINE CULTURE/COLONY COUNT: CPT

## 2017-06-09 PROCEDURE — 1180000000 HC REHAB R&B

## 2017-06-09 RX ORDER — ACETAMINOPHEN 80 MG
TABLET,CHEWABLE ORAL ONCE
Status: COMPLETED | OUTPATIENT
Start: 2017-06-09 | End: 2017-06-09

## 2017-06-09 RX ORDER — HYDROXYZINE PAMOATE 25 MG/1
50 CAPSULE ORAL EVERY 6 HOURS PRN
Status: DISCONTINUED | OUTPATIENT
Start: 2017-06-09 | End: 2017-07-01 | Stop reason: HOSPADM

## 2017-06-09 RX ORDER — LIDOCAINE 50 MG/G
3 PATCH TOPICAL DAILY
Status: DISCONTINUED | OUTPATIENT
Start: 2017-06-09 | End: 2017-07-01 | Stop reason: HOSPADM

## 2017-06-09 RX ORDER — QUETIAPINE FUMARATE 100 MG/1
100 TABLET, FILM COATED ORAL EVERY MORNING
Status: DISCONTINUED | OUTPATIENT
Start: 2017-06-10 | End: 2017-07-01 | Stop reason: HOSPADM

## 2017-06-09 RX ORDER — BISACODYL 10 MG
10 SUPPOSITORY, RECTAL RECTAL DAILY PRN
Status: DISCONTINUED | OUTPATIENT
Start: 2017-06-09 | End: 2017-07-01 | Stop reason: HOSPADM

## 2017-06-09 RX ORDER — SODIUM PHOSPHATE, DIBASIC AND SODIUM PHOSPHATE, MONOBASIC 7; 19 G/133ML; G/133ML
1 ENEMA RECTAL
Status: DISPENSED | OUTPATIENT
Start: 2017-06-09 | End: 2017-06-09

## 2017-06-09 RX ORDER — BUSPIRONE HYDROCHLORIDE 10 MG/1
5 TABLET ORAL 2 TIMES DAILY
Status: DISCONTINUED | OUTPATIENT
Start: 2017-06-09 | End: 2017-06-13

## 2017-06-09 RX ADMIN — LISINOPRIL 10 MG: 10 TABLET ORAL at 08:29

## 2017-06-09 RX ADMIN — ASPIRIN 81 MG: 81 TABLET, COATED ORAL at 08:29

## 2017-06-09 RX ADMIN — BUPRENORPHINE AND NALOXONE 1.5 TABLET: 8; 2 TABLET SUBLINGUAL at 12:54

## 2017-06-09 RX ADMIN — BUSPIRONE HYDROCHLORIDE 5 MG: 10 TABLET ORAL at 20:11

## 2017-06-09 RX ADMIN — METOPROLOL TARTRATE 25 MG: 25 TABLET, FILM COATED ORAL at 20:11

## 2017-06-09 RX ADMIN — Medication: at 15:55

## 2017-06-09 RX ADMIN — QUETIAPINE FUMARATE 300 MG: 300 TABLET, EXTENDED RELEASE ORAL at 20:14

## 2017-06-09 RX ADMIN — ENOXAPARIN SODIUM 40 MG: 40 INJECTION SUBCUTANEOUS at 08:29

## 2017-06-09 RX ADMIN — ATORVASTATIN CALCIUM 20 MG: 20 TABLET, FILM COATED ORAL at 20:11

## 2017-06-09 RX ADMIN — CITALOPRAM HYDROBROMIDE 40 MG: 20 TABLET ORAL at 08:29

## 2017-06-09 RX ADMIN — METOPROLOL TARTRATE 25 MG: 25 TABLET, FILM COATED ORAL at 08:29

## 2017-06-09 ASSESSMENT — ENCOUNTER SYMPTOMS
CHEST TIGHTNESS: 0
COUGH: 0
PHOTOPHOBIA: 0
ABDOMINAL PAIN: 0
BLOOD IN STOOL: 0
VOMITING: 0
NAUSEA: 0
TROUBLE SWALLOWING: 0
COLOR CHANGE: 0
EYE REDNESS: 0
ABDOMINAL DISTENTION: 0
EYE PAIN: 0
CONSTIPATION: 0
SHORTNESS OF BREATH: 0
ANAL BLEEDING: 0
WHEEZING: 0
FACIAL SWELLING: 0
CHOKING: 0

## 2017-06-09 ASSESSMENT — PAIN DESCRIPTION - PAIN TYPE
TYPE: ACUTE PAIN
TYPE: CHRONIC PAIN

## 2017-06-09 ASSESSMENT — PAIN DESCRIPTION - DESCRIPTORS: DESCRIPTORS: ACHING;SHARP

## 2017-06-09 ASSESSMENT — PAIN SCALES - GENERAL
PAINLEVEL_OUTOF10: 8
PAINLEVEL_OUTOF10: 6
PAINLEVEL_OUTOF10: 4
PAINLEVEL_OUTOF10: 8

## 2017-06-09 ASSESSMENT — PAIN DESCRIPTION - LOCATION
LOCATION: HEAD
LOCATION: HEAD

## 2017-06-09 ASSESSMENT — PAIN DESCRIPTION - FREQUENCY: FREQUENCY: CONTINUOUS

## 2017-06-10 LAB
ANION GAP SERPL CALCULATED.3IONS-SCNC: 10 MEQ/L (ref 7–13)
BUN BLDV-MCNC: 24 MG/DL (ref 6–20)
CALCIUM SERPL-MCNC: 8.2 MG/DL (ref 8.6–10.2)
CHLORIDE BLD-SCNC: 107 MEQ/L (ref 98–107)
CO2: 24 MEQ/L (ref 22–29)
CREAT SERPL-MCNC: 1.09 MG/DL (ref 0.7–1.2)
GFR AFRICAN AMERICAN: >60
GFR NON-AFRICAN AMERICAN: >60
GLUCOSE BLD-MCNC: 90 MG/DL (ref 74–109)
HCT VFR BLD CALC: 37.3 % (ref 42–52)
HEMOGLOBIN: 12.5 G/DL (ref 14–18)
MCH RBC QN AUTO: 30.2 PG (ref 27–31.3)
MCHC RBC AUTO-ENTMCNC: 33.4 % (ref 33–37)
MCV RBC AUTO: 90.4 FL (ref 80–100)
PDW BLD-RTO: 14.1 % (ref 11.5–14.5)
PLATELET # BLD: 151 K/UL (ref 130–400)
POTASSIUM SERPL-SCNC: 3.9 MEQ/L (ref 3.5–5.1)
RBC # BLD: 4.13 M/UL (ref 4.7–6.1)
SODIUM BLD-SCNC: 141 MEQ/L (ref 132–144)
WBC # BLD: 5.4 K/UL (ref 4.8–10.8)

## 2017-06-10 PROCEDURE — 97110 THERAPEUTIC EXERCISES: CPT

## 2017-06-10 PROCEDURE — 6370000000 HC RX 637 (ALT 250 FOR IP): Performed by: CLINICAL NURSE SPECIALIST

## 2017-06-10 PROCEDURE — 97535 SELF CARE MNGMENT TRAINING: CPT

## 2017-06-10 PROCEDURE — 97116 GAIT TRAINING THERAPY: CPT

## 2017-06-10 PROCEDURE — 97112 NEUROMUSCULAR REEDUCATION: CPT

## 2017-06-10 PROCEDURE — 1180000000 HC REHAB R&B

## 2017-06-10 PROCEDURE — 6370000000 HC RX 637 (ALT 250 FOR IP): Performed by: PHYSICIAN ASSISTANT

## 2017-06-10 PROCEDURE — 36415 COLL VENOUS BLD VENIPUNCTURE: CPT

## 2017-06-10 PROCEDURE — 0HBRXZZ EXCISION OF TOE NAIL, EXTERNAL APPROACH: ICD-10-PCS | Performed by: PODIATRIST

## 2017-06-10 PROCEDURE — 97530 THERAPEUTIC ACTIVITIES: CPT

## 2017-06-10 PROCEDURE — 6360000002 HC RX W HCPCS: Performed by: PHYSICIAN ASSISTANT

## 2017-06-10 PROCEDURE — 85027 COMPLETE CBC AUTOMATED: CPT

## 2017-06-10 PROCEDURE — 80048 BASIC METABOLIC PNL TOTAL CA: CPT

## 2017-06-10 RX ADMIN — BUSPIRONE HYDROCHLORIDE 5 MG: 10 TABLET ORAL at 22:08

## 2017-06-10 RX ADMIN — LISINOPRIL 10 MG: 10 TABLET ORAL at 08:07

## 2017-06-10 RX ADMIN — QUETIAPINE FUMARATE 100 MG: 100 TABLET, FILM COATED ORAL at 08:09

## 2017-06-10 RX ADMIN — BUPRENORPHINE AND NALOXONE 1.5 TABLET: 8; 2 TABLET SUBLINGUAL at 08:11

## 2017-06-10 RX ADMIN — BUSPIRONE HYDROCHLORIDE 5 MG: 10 TABLET ORAL at 08:07

## 2017-06-10 RX ADMIN — CITALOPRAM HYDROBROMIDE 40 MG: 20 TABLET ORAL at 08:08

## 2017-06-10 RX ADMIN — ASPIRIN 81 MG: 81 TABLET, COATED ORAL at 08:07

## 2017-06-10 RX ADMIN — METOPROLOL TARTRATE 25 MG: 25 TABLET, FILM COATED ORAL at 08:10

## 2017-06-10 RX ADMIN — ATORVASTATIN CALCIUM 20 MG: 20 TABLET, FILM COATED ORAL at 22:08

## 2017-06-10 RX ADMIN — ENOXAPARIN SODIUM 40 MG: 40 INJECTION SUBCUTANEOUS at 08:12

## 2017-06-10 RX ADMIN — METOPROLOL TARTRATE 25 MG: 25 TABLET, FILM COATED ORAL at 22:09

## 2017-06-10 RX ADMIN — QUETIAPINE FUMARATE 300 MG: 300 TABLET, EXTENDED RELEASE ORAL at 22:08

## 2017-06-10 ASSESSMENT — PAIN DESCRIPTION - LOCATION
LOCATION: HEAD
LOCATION: SCROTUM;HEAD
LOCATION: HEAD;SCROTUM
LOCATION: HEAD
LOCATION: HEAD;OTHER (COMMENT)

## 2017-06-10 ASSESSMENT — PAIN DESCRIPTION - DESCRIPTORS
DESCRIPTORS: ACHING

## 2017-06-10 ASSESSMENT — PAIN SCALES - GENERAL
PAINLEVEL_OUTOF10: 0
PAINLEVEL_OUTOF10: 6
PAINLEVEL_OUTOF10: 8
PAINLEVEL_OUTOF10: 6
PAINLEVEL_OUTOF10: 6
PAINLEVEL_OUTOF10: 0

## 2017-06-10 ASSESSMENT — PAIN DESCRIPTION - PAIN TYPE
TYPE: ACUTE PAIN

## 2017-06-10 ASSESSMENT — PAIN DESCRIPTION - FREQUENCY
FREQUENCY: CONTINUOUS

## 2017-06-10 ASSESSMENT — PAIN DESCRIPTION - ORIENTATION
ORIENTATION: RIGHT
ORIENTATION: RIGHT;LOWER
ORIENTATION: RIGHT;LEFT
ORIENTATION: RIGHT;LEFT

## 2017-06-11 LAB — URINE CULTURE, ROUTINE: NORMAL

## 2017-06-11 PROCEDURE — 6370000000 HC RX 637 (ALT 250 FOR IP): Performed by: CLINICAL NURSE SPECIALIST

## 2017-06-11 PROCEDURE — 6360000002 HC RX W HCPCS: Performed by: PHYSICIAN ASSISTANT

## 2017-06-11 PROCEDURE — 1180000000 HC REHAB R&B

## 2017-06-11 PROCEDURE — 6370000000 HC RX 637 (ALT 250 FOR IP): Performed by: PHYSICIAN ASSISTANT

## 2017-06-11 RX ADMIN — QUETIAPINE FUMARATE 100 MG: 100 TABLET, FILM COATED ORAL at 08:55

## 2017-06-11 RX ADMIN — METOPROLOL TARTRATE 25 MG: 25 TABLET, FILM COATED ORAL at 20:02

## 2017-06-11 RX ADMIN — LISINOPRIL 10 MG: 10 TABLET ORAL at 08:59

## 2017-06-11 RX ADMIN — ACETAMINOPHEN 650 MG: 325 TABLET ORAL at 14:16

## 2017-06-11 RX ADMIN — BUSPIRONE HYDROCHLORIDE 5 MG: 10 TABLET ORAL at 08:58

## 2017-06-11 RX ADMIN — ENOXAPARIN SODIUM 40 MG: 40 INJECTION SUBCUTANEOUS at 09:00

## 2017-06-11 RX ADMIN — HYDROXYZINE PAMOATE 50 MG: 25 CAPSULE ORAL at 09:05

## 2017-06-11 RX ADMIN — ACETAMINOPHEN 650 MG: 325 TABLET ORAL at 09:05

## 2017-06-11 RX ADMIN — BUPRENORPHINE AND NALOXONE 1.5 TABLET: 8; 2 TABLET SUBLINGUAL at 08:52

## 2017-06-11 RX ADMIN — METOPROLOL TARTRATE 25 MG: 25 TABLET, FILM COATED ORAL at 08:55

## 2017-06-11 RX ADMIN — CITALOPRAM HYDROBROMIDE 40 MG: 20 TABLET ORAL at 08:56

## 2017-06-11 RX ADMIN — ATORVASTATIN CALCIUM 20 MG: 20 TABLET, FILM COATED ORAL at 20:02

## 2017-06-11 RX ADMIN — QUETIAPINE FUMARATE 300 MG: 300 TABLET, EXTENDED RELEASE ORAL at 20:02

## 2017-06-11 RX ADMIN — ASPIRIN 81 MG: 81 TABLET, COATED ORAL at 08:59

## 2017-06-11 RX ADMIN — BUSPIRONE HYDROCHLORIDE 5 MG: 10 TABLET ORAL at 20:03

## 2017-06-11 ASSESSMENT — PAIN SCALES - GENERAL
PAINLEVEL_OUTOF10: 6
PAINLEVEL_OUTOF10: 8
PAINLEVEL_OUTOF10: 7
PAINLEVEL_OUTOF10: 6
PAINLEVEL_OUTOF10: 8

## 2017-06-12 LAB
ANION GAP SERPL CALCULATED.3IONS-SCNC: 10 MEQ/L (ref 7–13)
BLOOD CULTURE, ROUTINE: NORMAL
BUN BLDV-MCNC: 24 MG/DL (ref 6–20)
CALCIUM SERPL-MCNC: 7.9 MG/DL (ref 8.6–10.2)
CHLORIDE BLD-SCNC: 105 MEQ/L (ref 98–107)
CO2: 23 MEQ/L (ref 22–29)
CREAT SERPL-MCNC: 1.26 MG/DL (ref 0.7–1.2)
CULTURE, BLOOD 2: NORMAL
GFR AFRICAN AMERICAN: >60
GFR NON-AFRICAN AMERICAN: 58.8
GLUCOSE BLD-MCNC: 86 MG/DL (ref 74–109)
POTASSIUM SERPL-SCNC: 4 MEQ/L (ref 3.5–5.1)
SODIUM BLD-SCNC: 138 MEQ/L (ref 132–144)

## 2017-06-12 PROCEDURE — 36415 COLL VENOUS BLD VENIPUNCTURE: CPT

## 2017-06-12 PROCEDURE — 97112 NEUROMUSCULAR REEDUCATION: CPT

## 2017-06-12 PROCEDURE — 97535 SELF CARE MNGMENT TRAINING: CPT

## 2017-06-12 PROCEDURE — 6370000000 HC RX 637 (ALT 250 FOR IP): Performed by: PHYSICIAN ASSISTANT

## 2017-06-12 PROCEDURE — 97116 GAIT TRAINING THERAPY: CPT

## 2017-06-12 PROCEDURE — 6360000002 HC RX W HCPCS: Performed by: PHYSICIAN ASSISTANT

## 2017-06-12 PROCEDURE — 80048 BASIC METABOLIC PNL TOTAL CA: CPT

## 2017-06-12 PROCEDURE — 1180000000 HC REHAB R&B

## 2017-06-12 PROCEDURE — 97530 THERAPEUTIC ACTIVITIES: CPT

## 2017-06-12 PROCEDURE — 6370000000 HC RX 637 (ALT 250 FOR IP): Performed by: CLINICAL NURSE SPECIALIST

## 2017-06-12 PROCEDURE — 99233 SBSQ HOSP IP/OBS HIGH 50: CPT | Performed by: PHYSICAL MEDICINE & REHABILITATION

## 2017-06-12 RX ADMIN — ASPIRIN 81 MG: 81 TABLET, COATED ORAL at 08:51

## 2017-06-12 RX ADMIN — METOPROLOL TARTRATE 25 MG: 25 TABLET, FILM COATED ORAL at 08:52

## 2017-06-12 RX ADMIN — CITALOPRAM HYDROBROMIDE 40 MG: 20 TABLET ORAL at 08:51

## 2017-06-12 RX ADMIN — BUSPIRONE HYDROCHLORIDE 5 MG: 10 TABLET ORAL at 08:51

## 2017-06-12 RX ADMIN — BUPRENORPHINE AND NALOXONE 1.5 TABLET: 8; 2 TABLET SUBLINGUAL at 08:52

## 2017-06-12 RX ADMIN — LISINOPRIL 10 MG: 10 TABLET ORAL at 08:51

## 2017-06-12 RX ADMIN — QUETIAPINE FUMARATE 300 MG: 300 TABLET, EXTENDED RELEASE ORAL at 21:25

## 2017-06-12 RX ADMIN — QUETIAPINE FUMARATE 100 MG: 100 TABLET, FILM COATED ORAL at 08:51

## 2017-06-12 RX ADMIN — METOPROLOL TARTRATE 25 MG: 25 TABLET, FILM COATED ORAL at 21:25

## 2017-06-12 RX ADMIN — ENOXAPARIN SODIUM 40 MG: 40 INJECTION SUBCUTANEOUS at 08:50

## 2017-06-12 RX ADMIN — BUSPIRONE HYDROCHLORIDE 5 MG: 10 TABLET ORAL at 21:26

## 2017-06-12 RX ADMIN — ATORVASTATIN CALCIUM 20 MG: 20 TABLET, FILM COATED ORAL at 21:26

## 2017-06-12 ASSESSMENT — PAIN DESCRIPTION - FREQUENCY
FREQUENCY: CONTINUOUS
FREQUENCY: CONTINUOUS

## 2017-06-12 ASSESSMENT — PAIN DESCRIPTION - ORIENTATION: ORIENTATION: RIGHT

## 2017-06-12 ASSESSMENT — PAIN DESCRIPTION - DESCRIPTORS
DESCRIPTORS: ACHING
DESCRIPTORS: THROBBING

## 2017-06-12 ASSESSMENT — PAIN SCALES - GENERAL
PAINLEVEL_OUTOF10: 6
PAINLEVEL_OUTOF10: 6
PAINLEVEL_OUTOF10: 0
PAINLEVEL_OUTOF10: 0

## 2017-06-12 ASSESSMENT — PAIN DESCRIPTION - LOCATION
LOCATION: HEAD;SCROTUM
LOCATION: HEAD

## 2017-06-12 ASSESSMENT — PAIN DESCRIPTION - PAIN TYPE: TYPE: CHRONIC PAIN

## 2017-06-13 PROBLEM — R13.12 DYSPHAGIA, OROPHARYNGEAL PHASE: Status: ACTIVE | Noted: 2017-06-13

## 2017-06-13 LAB
ANION GAP SERPL CALCULATED.3IONS-SCNC: 10 MEQ/L (ref 7–13)
BUN BLDV-MCNC: 28 MG/DL (ref 6–20)
CALCIUM SERPL-MCNC: 8.3 MG/DL (ref 8.6–10.2)
CHLORIDE BLD-SCNC: 106 MEQ/L (ref 98–107)
CO2: 25 MEQ/L (ref 22–29)
CREAT SERPL-MCNC: 1.18 MG/DL (ref 0.7–1.2)
GFR AFRICAN AMERICAN: >60
GFR NON-AFRICAN AMERICAN: >60
GLUCOSE BLD-MCNC: 95 MG/DL (ref 74–109)
POTASSIUM SERPL-SCNC: 4.1 MEQ/L (ref 3.5–5.1)
SODIUM BLD-SCNC: 141 MEQ/L (ref 132–144)

## 2017-06-13 PROCEDURE — 36415 COLL VENOUS BLD VENIPUNCTURE: CPT

## 2017-06-13 PROCEDURE — 80048 BASIC METABOLIC PNL TOTAL CA: CPT

## 2017-06-13 PROCEDURE — 97530 THERAPEUTIC ACTIVITIES: CPT

## 2017-06-13 PROCEDURE — 6370000000 HC RX 637 (ALT 250 FOR IP): Performed by: PHYSICAL MEDICINE & REHABILITATION

## 2017-06-13 PROCEDURE — 99232 SBSQ HOSP IP/OBS MODERATE 35: CPT | Performed by: PHYSICAL MEDICINE & REHABILITATION

## 2017-06-13 PROCEDURE — 6370000000 HC RX 637 (ALT 250 FOR IP): Performed by: PHYSICIAN ASSISTANT

## 2017-06-13 PROCEDURE — 6370000000 HC RX 637 (ALT 250 FOR IP): Performed by: CLINICAL NURSE SPECIALIST

## 2017-06-13 PROCEDURE — 97140 MANUAL THERAPY 1/> REGIONS: CPT

## 2017-06-13 PROCEDURE — 97112 NEUROMUSCULAR REEDUCATION: CPT

## 2017-06-13 PROCEDURE — 97112 NEUROMUSCULAR REEDUCATION: CPT | Performed by: INTERNAL MEDICINE

## 2017-06-13 PROCEDURE — 97116 GAIT TRAINING THERAPY: CPT

## 2017-06-13 PROCEDURE — 97116 GAIT TRAINING THERAPY: CPT | Performed by: INTERNAL MEDICINE

## 2017-06-13 PROCEDURE — 99233 SBSQ HOSP IP/OBS HIGH 50: CPT | Performed by: CLINICAL NURSE SPECIALIST

## 2017-06-13 PROCEDURE — 92526 ORAL FUNCTION THERAPY: CPT

## 2017-06-13 PROCEDURE — 6360000002 HC RX W HCPCS: Performed by: PHYSICIAN ASSISTANT

## 2017-06-13 PROCEDURE — 97535 SELF CARE MNGMENT TRAINING: CPT

## 2017-06-13 PROCEDURE — 1180000000 HC REHAB R&B

## 2017-06-13 RX ORDER — ANALGESIC BALM 1.74; 4.06 G/29G; G/29G
OINTMENT TOPICAL 3 TIMES DAILY
Status: DISCONTINUED | OUTPATIENT
Start: 2017-06-13 | End: 2017-07-01 | Stop reason: HOSPADM

## 2017-06-13 RX ADMIN — ATORVASTATIN CALCIUM 20 MG: 20 TABLET, FILM COATED ORAL at 21:37

## 2017-06-13 RX ADMIN — BUPRENORPHINE AND NALOXONE 1.5 TABLET: 8; 2 TABLET SUBLINGUAL at 09:02

## 2017-06-13 RX ADMIN — ACETAMINOPHEN 650 MG: 325 TABLET ORAL at 13:34

## 2017-06-13 RX ADMIN — QUETIAPINE FUMARATE 100 MG: 100 TABLET, FILM COATED ORAL at 09:01

## 2017-06-13 RX ADMIN — ANALGESIC BALM: 1.74; 4.06 OINTMENT TOPICAL at 12:52

## 2017-06-13 RX ADMIN — METOPROLOL TARTRATE 25 MG: 25 TABLET, FILM COATED ORAL at 21:37

## 2017-06-13 RX ADMIN — QUETIAPINE FUMARATE 300 MG: 300 TABLET, EXTENDED RELEASE ORAL at 21:36

## 2017-06-13 RX ADMIN — ENOXAPARIN SODIUM 40 MG: 40 INJECTION SUBCUTANEOUS at 09:01

## 2017-06-13 RX ADMIN — ASPIRIN 81 MG: 81 TABLET, COATED ORAL at 09:01

## 2017-06-13 RX ADMIN — CITALOPRAM HYDROBROMIDE 40 MG: 20 TABLET ORAL at 09:02

## 2017-06-13 RX ADMIN — LISINOPRIL 10 MG: 10 TABLET ORAL at 09:01

## 2017-06-13 RX ADMIN — METOPROLOL TARTRATE 25 MG: 25 TABLET, FILM COATED ORAL at 09:01

## 2017-06-13 RX ADMIN — BUSPIRONE HYDROCHLORIDE 5 MG: 10 TABLET ORAL at 09:02

## 2017-06-13 ASSESSMENT — PAIN SCALES - GENERAL
PAINLEVEL_OUTOF10: 10
PAINLEVEL_OUTOF10: 8
PAINLEVEL_OUTOF10: 0

## 2017-06-13 ASSESSMENT — PAIN DESCRIPTION - LOCATION
LOCATION: HEAD
LOCATION: OTHER (COMMENT)

## 2017-06-13 ASSESSMENT — PAIN DESCRIPTION - PAIN TYPE: TYPE: CHRONIC PAIN

## 2017-06-13 ASSESSMENT — PAIN DESCRIPTION - DESCRIPTORS: DESCRIPTORS: ACHING

## 2017-06-14 LAB
ANION GAP SERPL CALCULATED.3IONS-SCNC: 9 MEQ/L (ref 7–13)
BUN BLDV-MCNC: 28 MG/DL (ref 6–20)
CALCIUM SERPL-MCNC: 8.3 MG/DL (ref 8.6–10.2)
CHLORIDE BLD-SCNC: 104 MEQ/L (ref 98–107)
CO2: 25 MEQ/L (ref 22–29)
CREAT SERPL-MCNC: 1.33 MG/DL (ref 0.7–1.2)
GFR AFRICAN AMERICAN: >60
GFR NON-AFRICAN AMERICAN: 55.2
GLUCOSE BLD-MCNC: 91 MG/DL (ref 74–109)
POTASSIUM SERPL-SCNC: 4 MEQ/L (ref 3.5–5.1)
SODIUM BLD-SCNC: 138 MEQ/L (ref 132–144)

## 2017-06-14 PROCEDURE — 6370000000 HC RX 637 (ALT 250 FOR IP): Performed by: CLINICAL NURSE SPECIALIST

## 2017-06-14 PROCEDURE — 80048 BASIC METABOLIC PNL TOTAL CA: CPT

## 2017-06-14 PROCEDURE — 92526 ORAL FUNCTION THERAPY: CPT

## 2017-06-14 PROCEDURE — 6370000000 HC RX 637 (ALT 250 FOR IP): Performed by: PHYSICIAN ASSISTANT

## 2017-06-14 PROCEDURE — 1180000000 HC REHAB R&B

## 2017-06-14 PROCEDURE — 97530 THERAPEUTIC ACTIVITIES: CPT

## 2017-06-14 PROCEDURE — 97116 GAIT TRAINING THERAPY: CPT

## 2017-06-14 PROCEDURE — 97110 THERAPEUTIC EXERCISES: CPT | Performed by: INTERNAL MEDICINE

## 2017-06-14 PROCEDURE — 36415 COLL VENOUS BLD VENIPUNCTURE: CPT

## 2017-06-14 PROCEDURE — 97112 NEUROMUSCULAR REEDUCATION: CPT

## 2017-06-14 PROCEDURE — 97535 SELF CARE MNGMENT TRAINING: CPT

## 2017-06-14 PROCEDURE — 6360000002 HC RX W HCPCS: Performed by: PHYSICIAN ASSISTANT

## 2017-06-14 PROCEDURE — 99232 SBSQ HOSP IP/OBS MODERATE 35: CPT | Performed by: PHYSICAL MEDICINE & REHABILITATION

## 2017-06-14 RX ADMIN — METOPROLOL TARTRATE 25 MG: 25 TABLET, FILM COATED ORAL at 09:47

## 2017-06-14 RX ADMIN — METOPROLOL TARTRATE 25 MG: 25 TABLET, FILM COATED ORAL at 19:27

## 2017-06-14 RX ADMIN — QUETIAPINE FUMARATE 100 MG: 100 TABLET, FILM COATED ORAL at 09:46

## 2017-06-14 RX ADMIN — ACETAMINOPHEN 650 MG: 325 TABLET ORAL at 09:52

## 2017-06-14 RX ADMIN — LISINOPRIL 10 MG: 10 TABLET ORAL at 09:46

## 2017-06-14 RX ADMIN — CITALOPRAM HYDROBROMIDE 40 MG: 20 TABLET ORAL at 09:47

## 2017-06-14 RX ADMIN — ENOXAPARIN SODIUM 40 MG: 40 INJECTION SUBCUTANEOUS at 09:49

## 2017-06-14 RX ADMIN — ASPIRIN 81 MG: 81 TABLET, COATED ORAL at 09:46

## 2017-06-14 RX ADMIN — ATORVASTATIN CALCIUM 20 MG: 20 TABLET, FILM COATED ORAL at 19:28

## 2017-06-14 RX ADMIN — BUPRENORPHINE AND NALOXONE 1.5 TABLET: 8; 2 TABLET SUBLINGUAL at 09:47

## 2017-06-14 RX ADMIN — QUETIAPINE FUMARATE 300 MG: 300 TABLET, EXTENDED RELEASE ORAL at 19:27

## 2017-06-14 ASSESSMENT — PAIN DESCRIPTION - DESCRIPTORS: DESCRIPTORS: ACHING

## 2017-06-14 ASSESSMENT — PAIN SCALES - GENERAL
PAINLEVEL_OUTOF10: 7
PAINLEVEL_OUTOF10: 7
PAINLEVEL_OUTOF10: 0
PAINLEVEL_OUTOF10: 7
PAINLEVEL_OUTOF10: 6

## 2017-06-14 ASSESSMENT — PAIN DESCRIPTION - LOCATION
LOCATION: GROIN;HEAD
LOCATION: OTHER (COMMENT);HEAD

## 2017-06-14 ASSESSMENT — PAIN DESCRIPTION - FREQUENCY: FREQUENCY: CONTINUOUS

## 2017-06-14 ASSESSMENT — PAIN DESCRIPTION - ORIENTATION: ORIENTATION: RIGHT

## 2017-06-14 ASSESSMENT — PAIN DESCRIPTION - PAIN TYPE
TYPE: CHRONIC PAIN
TYPE: CHRONIC PAIN

## 2017-06-15 LAB
ANION GAP SERPL CALCULATED.3IONS-SCNC: 10 MEQ/L (ref 7–13)
BUN BLDV-MCNC: 32 MG/DL (ref 6–20)
CALCIUM SERPL-MCNC: 8.2 MG/DL (ref 8.6–10.2)
CHLORIDE BLD-SCNC: 104 MEQ/L (ref 98–107)
CO2: 25 MEQ/L (ref 22–29)
CREAT SERPL-MCNC: 1.37 MG/DL (ref 0.7–1.2)
GFR AFRICAN AMERICAN: >60
GFR NON-AFRICAN AMERICAN: 53.4
GLUCOSE BLD-MCNC: 84 MG/DL (ref 74–109)
POTASSIUM SERPL-SCNC: 4.4 MEQ/L (ref 3.5–5.1)
SODIUM BLD-SCNC: 139 MEQ/L (ref 132–144)

## 2017-06-15 PROCEDURE — 6370000000 HC RX 637 (ALT 250 FOR IP): Performed by: CLINICAL NURSE SPECIALIST

## 2017-06-15 PROCEDURE — 97530 THERAPEUTIC ACTIVITIES: CPT

## 2017-06-15 PROCEDURE — 1180000000 HC REHAB R&B

## 2017-06-15 PROCEDURE — 36415 COLL VENOUS BLD VENIPUNCTURE: CPT

## 2017-06-15 PROCEDURE — 80048 BASIC METABOLIC PNL TOTAL CA: CPT

## 2017-06-15 PROCEDURE — 97110 THERAPEUTIC EXERCISES: CPT

## 2017-06-15 PROCEDURE — 97112 NEUROMUSCULAR REEDUCATION: CPT

## 2017-06-15 PROCEDURE — 99232 SBSQ HOSP IP/OBS MODERATE 35: CPT | Performed by: PHYSICAL MEDICINE & REHABILITATION

## 2017-06-15 PROCEDURE — 97112 NEUROMUSCULAR REEDUCATION: CPT | Performed by: INTERNAL MEDICINE

## 2017-06-15 PROCEDURE — 6370000000 HC RX 637 (ALT 250 FOR IP): Performed by: PHYSICIAN ASSISTANT

## 2017-06-15 PROCEDURE — 97116 GAIT TRAINING THERAPY: CPT | Performed by: INTERNAL MEDICINE

## 2017-06-15 PROCEDURE — 97116 GAIT TRAINING THERAPY: CPT

## 2017-06-15 PROCEDURE — 6360000002 HC RX W HCPCS: Performed by: PHYSICIAN ASSISTANT

## 2017-06-15 RX ADMIN — CITALOPRAM HYDROBROMIDE 40 MG: 20 TABLET ORAL at 09:07

## 2017-06-15 RX ADMIN — QUETIAPINE FUMARATE 300 MG: 300 TABLET, EXTENDED RELEASE ORAL at 19:46

## 2017-06-15 RX ADMIN — ENOXAPARIN SODIUM 40 MG: 40 INJECTION SUBCUTANEOUS at 09:07

## 2017-06-15 RX ADMIN — METOPROLOL TARTRATE 25 MG: 25 TABLET, FILM COATED ORAL at 19:45

## 2017-06-15 RX ADMIN — BUPRENORPHINE AND NALOXONE 1.5 TABLET: 8; 2 TABLET SUBLINGUAL at 09:08

## 2017-06-15 RX ADMIN — ACETAMINOPHEN 650 MG: 325 TABLET ORAL at 09:08

## 2017-06-15 RX ADMIN — METOPROLOL TARTRATE 25 MG: 25 TABLET, FILM COATED ORAL at 09:08

## 2017-06-15 RX ADMIN — ASPIRIN 81 MG: 81 TABLET, COATED ORAL at 09:07

## 2017-06-15 RX ADMIN — ATORVASTATIN CALCIUM 20 MG: 20 TABLET, FILM COATED ORAL at 19:45

## 2017-06-15 RX ADMIN — QUETIAPINE FUMARATE 100 MG: 100 TABLET, FILM COATED ORAL at 09:07

## 2017-06-15 RX ADMIN — LISINOPRIL 10 MG: 10 TABLET ORAL at 09:16

## 2017-06-15 ASSESSMENT — PAIN DESCRIPTION - ORIENTATION
ORIENTATION: RIGHT
ORIENTATION: RIGHT

## 2017-06-15 ASSESSMENT — PAIN DESCRIPTION - PAIN TYPE
TYPE: CHRONIC PAIN
TYPE: CHRONIC PAIN

## 2017-06-15 ASSESSMENT — PAIN SCALES - GENERAL
PAINLEVEL_OUTOF10: 7
PAINLEVEL_OUTOF10: 0

## 2017-06-15 ASSESSMENT — PAIN DESCRIPTION - DESCRIPTORS
DESCRIPTORS: THROBBING
DESCRIPTORS: THROBBING

## 2017-06-15 ASSESSMENT — PAIN DESCRIPTION - LOCATION
LOCATION: HEAD;GROIN
LOCATION: HEAD;GROIN

## 2017-06-16 LAB
ANION GAP SERPL CALCULATED.3IONS-SCNC: 11 MEQ/L (ref 7–13)
BUN BLDV-MCNC: 33 MG/DL (ref 6–20)
CALCIUM SERPL-MCNC: 8.4 MG/DL (ref 8.6–10.2)
CHLORIDE BLD-SCNC: 104 MEQ/L (ref 98–107)
CO2: 23 MEQ/L (ref 22–29)
CREAT SERPL-MCNC: 1.21 MG/DL (ref 0.7–1.2)
GFR AFRICAN AMERICAN: >60
GFR NON-AFRICAN AMERICAN: >60
GLUCOSE BLD-MCNC: 93 MG/DL (ref 74–109)
POTASSIUM SERPL-SCNC: 4.4 MEQ/L (ref 3.5–5.1)
SODIUM BLD-SCNC: 138 MEQ/L (ref 132–144)

## 2017-06-16 PROCEDURE — 80048 BASIC METABOLIC PNL TOTAL CA: CPT

## 2017-06-16 PROCEDURE — 99232 SBSQ HOSP IP/OBS MODERATE 35: CPT | Performed by: PHYSICAL MEDICINE & REHABILITATION

## 2017-06-16 PROCEDURE — 6360000002 HC RX W HCPCS: Performed by: PHYSICIAN ASSISTANT

## 2017-06-16 PROCEDURE — 6370000000 HC RX 637 (ALT 250 FOR IP): Performed by: CLINICAL NURSE SPECIALIST

## 2017-06-16 PROCEDURE — 6370000000 HC RX 637 (ALT 250 FOR IP): Performed by: PHYSICIAN ASSISTANT

## 2017-06-16 PROCEDURE — 97116 GAIT TRAINING THERAPY: CPT

## 2017-06-16 PROCEDURE — 97530 THERAPEUTIC ACTIVITIES: CPT

## 2017-06-16 PROCEDURE — 97110 THERAPEUTIC EXERCISES: CPT

## 2017-06-16 PROCEDURE — 97535 SELF CARE MNGMENT TRAINING: CPT

## 2017-06-16 PROCEDURE — 36415 COLL VENOUS BLD VENIPUNCTURE: CPT

## 2017-06-16 PROCEDURE — 1180000000 HC REHAB R&B

## 2017-06-16 RX ADMIN — QUETIAPINE FUMARATE 100 MG: 100 TABLET, FILM COATED ORAL at 08:45

## 2017-06-16 RX ADMIN — CITALOPRAM HYDROBROMIDE 40 MG: 20 TABLET ORAL at 08:48

## 2017-06-16 RX ADMIN — LISINOPRIL 10 MG: 10 TABLET ORAL at 08:47

## 2017-06-16 RX ADMIN — ATORVASTATIN CALCIUM 20 MG: 20 TABLET, FILM COATED ORAL at 20:23

## 2017-06-16 RX ADMIN — QUETIAPINE FUMARATE 300 MG: 300 TABLET, EXTENDED RELEASE ORAL at 20:23

## 2017-06-16 RX ADMIN — METOPROLOL TARTRATE 25 MG: 25 TABLET, FILM COATED ORAL at 20:23

## 2017-06-16 RX ADMIN — ACETAMINOPHEN 650 MG: 325 TABLET ORAL at 08:47

## 2017-06-16 RX ADMIN — BUPRENORPHINE AND NALOXONE 1.5 TABLET: 8; 2 TABLET SUBLINGUAL at 08:47

## 2017-06-16 RX ADMIN — HYDROXYZINE PAMOATE 50 MG: 25 CAPSULE ORAL at 12:51

## 2017-06-16 RX ADMIN — ENOXAPARIN SODIUM 40 MG: 40 INJECTION SUBCUTANEOUS at 08:48

## 2017-06-16 RX ADMIN — ASPIRIN 81 MG: 81 TABLET, COATED ORAL at 08:48

## 2017-06-16 RX ADMIN — METOPROLOL TARTRATE 25 MG: 25 TABLET, FILM COATED ORAL at 08:48

## 2017-06-16 ASSESSMENT — PAIN DESCRIPTION - LOCATION
LOCATION: HEAD;GROIN
LOCATION: GROIN;HEAD
LOCATION: GROIN;HEAD

## 2017-06-16 ASSESSMENT — PAIN DESCRIPTION - ORIENTATION: ORIENTATION: RIGHT

## 2017-06-16 ASSESSMENT — PAIN DESCRIPTION - DESCRIPTORS
DESCRIPTORS: ACHING;SHARP;SHOOTING
DESCRIPTORS: ACHING
DESCRIPTORS: ACHING;SHARP;SHOOTING

## 2017-06-16 ASSESSMENT — PAIN DESCRIPTION - FREQUENCY: FREQUENCY: CONTINUOUS

## 2017-06-16 ASSESSMENT — PAIN SCALES - GENERAL
PAINLEVEL_OUTOF10: 7
PAINLEVEL_OUTOF10: 8
PAINLEVEL_OUTOF10: 0

## 2017-06-16 ASSESSMENT — PAIN DESCRIPTION - PAIN TYPE
TYPE: CHRONIC PAIN
TYPE: ACUTE PAIN

## 2017-06-17 ENCOUNTER — APPOINTMENT (OUTPATIENT)
Dept: CT IMAGING | Age: 57
DRG: 058 | End: 2017-06-17
Attending: PHYSICAL MEDICINE & REHABILITATION
Payer: COMMERCIAL

## 2017-06-17 LAB
ANION GAP SERPL CALCULATED.3IONS-SCNC: 13 MEQ/L (ref 7–13)
BUN BLDV-MCNC: 28 MG/DL (ref 6–20)
CALCIUM SERPL-MCNC: 8.3 MG/DL (ref 8.6–10.2)
CHLORIDE BLD-SCNC: 105 MEQ/L (ref 98–107)
CO2: 23 MEQ/L (ref 22–29)
CREAT SERPL-MCNC: 1.27 MG/DL (ref 0.7–1.2)
GFR AFRICAN AMERICAN: >60
GFR NON-AFRICAN AMERICAN: 58.3
GLUCOSE BLD-MCNC: 88 MG/DL (ref 74–109)
POTASSIUM SERPL-SCNC: 4.5 MEQ/L (ref 3.5–5.1)
SODIUM BLD-SCNC: 141 MEQ/L (ref 132–144)

## 2017-06-17 PROCEDURE — 97530 THERAPEUTIC ACTIVITIES: CPT

## 2017-06-17 PROCEDURE — 36415 COLL VENOUS BLD VENIPUNCTURE: CPT

## 2017-06-17 PROCEDURE — 97116 GAIT TRAINING THERAPY: CPT

## 2017-06-17 PROCEDURE — 6360000002 HC RX W HCPCS: Performed by: PHYSICIAN ASSISTANT

## 2017-06-17 PROCEDURE — 74176 CT ABD & PELVIS W/O CONTRAST: CPT

## 2017-06-17 PROCEDURE — 6370000000 HC RX 637 (ALT 250 FOR IP): Performed by: PHYSICIAN ASSISTANT

## 2017-06-17 PROCEDURE — 99232 SBSQ HOSP IP/OBS MODERATE 35: CPT | Performed by: PHYSICAL MEDICINE & REHABILITATION

## 2017-06-17 PROCEDURE — 1180000000 HC REHAB R&B

## 2017-06-17 PROCEDURE — 6370000000 HC RX 637 (ALT 250 FOR IP): Performed by: CLINICAL NURSE SPECIALIST

## 2017-06-17 PROCEDURE — 80048 BASIC METABOLIC PNL TOTAL CA: CPT

## 2017-06-17 RX ADMIN — ASPIRIN 81 MG: 81 TABLET, COATED ORAL at 08:40

## 2017-06-17 RX ADMIN — CITALOPRAM HYDROBROMIDE 40 MG: 20 TABLET ORAL at 08:40

## 2017-06-17 RX ADMIN — HYDROXYZINE PAMOATE 50 MG: 25 CAPSULE ORAL at 13:55

## 2017-06-17 RX ADMIN — BUPRENORPHINE AND NALOXONE 1.5 TABLET: 8; 2 TABLET SUBLINGUAL at 08:39

## 2017-06-17 RX ADMIN — ATORVASTATIN CALCIUM 20 MG: 20 TABLET, FILM COATED ORAL at 21:42

## 2017-06-17 RX ADMIN — LISINOPRIL 10 MG: 10 TABLET ORAL at 08:40

## 2017-06-17 RX ADMIN — ACETAMINOPHEN 650 MG: 325 TABLET ORAL at 08:39

## 2017-06-17 RX ADMIN — QUETIAPINE FUMARATE 100 MG: 100 TABLET, FILM COATED ORAL at 08:39

## 2017-06-17 RX ADMIN — QUETIAPINE FUMARATE 300 MG: 300 TABLET, EXTENDED RELEASE ORAL at 21:42

## 2017-06-17 RX ADMIN — ACETAMINOPHEN 650 MG: 325 TABLET ORAL at 13:54

## 2017-06-17 RX ADMIN — ENOXAPARIN SODIUM 40 MG: 40 INJECTION SUBCUTANEOUS at 08:40

## 2017-06-17 RX ADMIN — METOPROLOL TARTRATE 25 MG: 25 TABLET, FILM COATED ORAL at 08:40

## 2017-06-17 ASSESSMENT — PAIN DESCRIPTION - LOCATION
LOCATION: GROIN;HEAD
LOCATION: GROIN;HEAD
LOCATION: ABDOMEN;HEAD

## 2017-06-17 ASSESSMENT — PAIN DESCRIPTION - DESCRIPTORS
DESCRIPTORS: THROBBING
DESCRIPTORS: SHARP;SHOOTING
DESCRIPTORS: ACHING;SHARP;SHOOTING

## 2017-06-17 ASSESSMENT — PAIN SCALES - GENERAL
PAINLEVEL_OUTOF10: 8
PAINLEVEL_OUTOF10: 7
PAINLEVEL_OUTOF10: 7
PAINLEVEL_OUTOF10: 5
PAINLEVEL_OUTOF10: 2
PAINLEVEL_OUTOF10: 5

## 2017-06-17 ASSESSMENT — PAIN DESCRIPTION - ORIENTATION
ORIENTATION: RIGHT

## 2017-06-18 LAB
ANION GAP SERPL CALCULATED.3IONS-SCNC: 10 MEQ/L (ref 7–13)
BUN BLDV-MCNC: 28 MG/DL (ref 6–20)
CALCIUM SERPL-MCNC: 8.4 MG/DL (ref 8.6–10.2)
CHLORIDE BLD-SCNC: 103 MEQ/L (ref 98–107)
CO2: 25 MEQ/L (ref 22–29)
CREAT SERPL-MCNC: 1.19 MG/DL (ref 0.7–1.2)
GFR AFRICAN AMERICAN: >60
GFR NON-AFRICAN AMERICAN: >60
GLUCOSE BLD-MCNC: 118 MG/DL (ref 74–109)
POTASSIUM SERPL-SCNC: 4.3 MEQ/L (ref 3.5–5.1)
SODIUM BLD-SCNC: 138 MEQ/L (ref 132–144)

## 2017-06-18 PROCEDURE — 6360000002 HC RX W HCPCS: Performed by: PHYSICAL MEDICINE & REHABILITATION

## 2017-06-18 PROCEDURE — 6370000000 HC RX 637 (ALT 250 FOR IP): Performed by: PHYSICIAN ASSISTANT

## 2017-06-18 PROCEDURE — 36415 COLL VENOUS BLD VENIPUNCTURE: CPT

## 2017-06-18 PROCEDURE — 99232 SBSQ HOSP IP/OBS MODERATE 35: CPT | Performed by: PHYSICAL MEDICINE & REHABILITATION

## 2017-06-18 PROCEDURE — 80048 BASIC METABOLIC PNL TOTAL CA: CPT

## 2017-06-18 PROCEDURE — 6370000000 HC RX 637 (ALT 250 FOR IP): Performed by: PHYSICAL MEDICINE & REHABILITATION

## 2017-06-18 PROCEDURE — 1180000000 HC REHAB R&B

## 2017-06-18 PROCEDURE — 6360000002 HC RX W HCPCS: Performed by: PHYSICIAN ASSISTANT

## 2017-06-18 PROCEDURE — 6370000000 HC RX 637 (ALT 250 FOR IP): Performed by: CLINICAL NURSE SPECIALIST

## 2017-06-18 RX ORDER — CYANOCOBALAMIN 1000 UG/ML
1000 INJECTION INTRAMUSCULAR; SUBCUTANEOUS WEEKLY
Status: DISCONTINUED | OUTPATIENT
Start: 2017-06-18 | End: 2017-07-01 | Stop reason: HOSPADM

## 2017-06-18 RX ORDER — TOPIRAMATE 25 MG/1
25 TABLET ORAL 2 TIMES DAILY
Status: DISCONTINUED | OUTPATIENT
Start: 2017-06-18 | End: 2017-07-01 | Stop reason: HOSPADM

## 2017-06-18 RX ORDER — UBIDECARENONE 100 MG
100 CAPSULE ORAL 2 TIMES DAILY
Status: DISCONTINUED | OUTPATIENT
Start: 2017-06-18 | End: 2017-07-01 | Stop reason: HOSPADM

## 2017-06-18 RX ADMIN — LISINOPRIL 10 MG: 10 TABLET ORAL at 09:15

## 2017-06-18 RX ADMIN — Medication 100 MG: at 12:05

## 2017-06-18 RX ADMIN — METOPROLOL TARTRATE 25 MG: 25 TABLET, FILM COATED ORAL at 20:50

## 2017-06-18 RX ADMIN — VITAMIN D, TAB 1000IU (100/BT) 1000 UNITS: 25 TAB at 17:20

## 2017-06-18 RX ADMIN — ATORVASTATIN CALCIUM 20 MG: 20 TABLET, FILM COATED ORAL at 20:50

## 2017-06-18 RX ADMIN — HYDROXYZINE PAMOATE 50 MG: 25 CAPSULE ORAL at 09:15

## 2017-06-18 RX ADMIN — QUETIAPINE FUMARATE 100 MG: 100 TABLET, FILM COATED ORAL at 12:05

## 2017-06-18 RX ADMIN — TOPIRAMATE 25 MG: 25 TABLET ORAL at 20:50

## 2017-06-18 RX ADMIN — TOPIRAMATE 25 MG: 25 TABLET ORAL at 12:05

## 2017-06-18 RX ADMIN — Medication 100 MG: at 20:50

## 2017-06-18 RX ADMIN — CITALOPRAM HYDROBROMIDE 40 MG: 20 TABLET ORAL at 09:15

## 2017-06-18 RX ADMIN — CYANOCOBALAMIN 1000 MCG: 1000 INJECTION INTRAMUSCULAR; SUBCUTANEOUS at 12:05

## 2017-06-18 RX ADMIN — ACETAMINOPHEN 650 MG: 325 TABLET ORAL at 09:14

## 2017-06-18 RX ADMIN — BUPRENORPHINE AND NALOXONE 1.5 TABLET: 8; 2 TABLET SUBLINGUAL at 09:15

## 2017-06-18 RX ADMIN — ENOXAPARIN SODIUM 40 MG: 40 INJECTION SUBCUTANEOUS at 09:16

## 2017-06-18 RX ADMIN — QUETIAPINE FUMARATE 300 MG: 300 TABLET, EXTENDED RELEASE ORAL at 20:49

## 2017-06-18 RX ADMIN — ASPIRIN 81 MG: 81 TABLET, COATED ORAL at 09:15

## 2017-06-18 ASSESSMENT — PAIN SCALES - GENERAL
PAINLEVEL_OUTOF10: 5
PAINLEVEL_OUTOF10: 8
PAINLEVEL_OUTOF10: 0
PAINLEVEL_OUTOF10: 5
PAINLEVEL_OUTOF10: 0

## 2017-06-18 ASSESSMENT — PAIN DESCRIPTION - DESCRIPTORS: DESCRIPTORS: SHARP;SHOOTING;ACHING

## 2017-06-18 ASSESSMENT — PAIN DESCRIPTION - ORIENTATION: ORIENTATION: RIGHT

## 2017-06-18 ASSESSMENT — PAIN DESCRIPTION - LOCATION: LOCATION: GROIN;HEAD

## 2017-06-19 LAB
ANION GAP SERPL CALCULATED.3IONS-SCNC: 9 MEQ/L (ref 7–13)
BASOPHILS ABSOLUTE: 0 K/UL (ref 0–0.2)
BASOPHILS RELATIVE PERCENT: 0.8 %
BUN BLDV-MCNC: 30 MG/DL (ref 6–20)
CALCIUM SERPL-MCNC: 8.6 MG/DL (ref 8.6–10.2)
CHLORIDE BLD-SCNC: 107 MEQ/L (ref 98–107)
CO2: 24 MEQ/L (ref 22–29)
CREAT SERPL-MCNC: 1.22 MG/DL (ref 0.7–1.2)
EOSINOPHILS ABSOLUTE: 0.1 K/UL (ref 0–0.7)
EOSINOPHILS RELATIVE PERCENT: 2.6 %
GFR AFRICAN AMERICAN: >60
GFR NON-AFRICAN AMERICAN: >60
GLUCOSE BLD-MCNC: 91 MG/DL (ref 74–109)
GLUCOSE BLD-MCNC: 95 MG/DL (ref 60–115)
HCT VFR BLD CALC: 35.2 % (ref 42–52)
HEMOGLOBIN: 11.9 G/DL (ref 14–18)
LYMPHOCYTES ABSOLUTE: 1.7 K/UL (ref 1–4.8)
LYMPHOCYTES RELATIVE PERCENT: 34.9 %
MAGNESIUM: 2 MG/DL (ref 1.7–2.3)
MCH RBC QN AUTO: 30.5 PG (ref 27–31.3)
MCHC RBC AUTO-ENTMCNC: 33.7 % (ref 33–37)
MCV RBC AUTO: 90.6 FL (ref 80–100)
MONOCYTES ABSOLUTE: 0.4 K/UL (ref 0.2–0.8)
MONOCYTES RELATIVE PERCENT: 8.2 %
NEUTROPHILS ABSOLUTE: 2.6 K/UL (ref 1.4–6.5)
NEUTROPHILS RELATIVE PERCENT: 53.5 %
PDW BLD-RTO: 14.4 % (ref 11.5–14.5)
PERFORMED ON: NORMAL
PLATELET # BLD: 142 K/UL (ref 130–400)
POTASSIUM SERPL-SCNC: 4.1 MEQ/L (ref 3.5–5.1)
RBC # BLD: 3.89 M/UL (ref 4.7–6.1)
SODIUM BLD-SCNC: 140 MEQ/L (ref 132–144)
WBC # BLD: 4.9 K/UL (ref 4.8–10.8)

## 2017-06-19 PROCEDURE — 99232 SBSQ HOSP IP/OBS MODERATE 35: CPT | Performed by: PHYSICAL MEDICINE & REHABILITATION

## 2017-06-19 PROCEDURE — 36415 COLL VENOUS BLD VENIPUNCTURE: CPT

## 2017-06-19 PROCEDURE — 97535 SELF CARE MNGMENT TRAINING: CPT

## 2017-06-19 PROCEDURE — 83735 ASSAY OF MAGNESIUM: CPT

## 2017-06-19 PROCEDURE — 97530 THERAPEUTIC ACTIVITIES: CPT

## 2017-06-19 PROCEDURE — 80048 BASIC METABOLIC PNL TOTAL CA: CPT

## 2017-06-19 PROCEDURE — 97112 NEUROMUSCULAR REEDUCATION: CPT

## 2017-06-19 PROCEDURE — 6370000000 HC RX 637 (ALT 250 FOR IP): Performed by: CLINICAL NURSE SPECIALIST

## 2017-06-19 PROCEDURE — 85025 COMPLETE CBC W/AUTO DIFF WBC: CPT

## 2017-06-19 PROCEDURE — 6370000000 HC RX 637 (ALT 250 FOR IP): Performed by: PHYSICAL MEDICINE & REHABILITATION

## 2017-06-19 PROCEDURE — 6360000002 HC RX W HCPCS: Performed by: PHYSICIAN ASSISTANT

## 2017-06-19 PROCEDURE — 97116 GAIT TRAINING THERAPY: CPT

## 2017-06-19 PROCEDURE — 1180000000 HC REHAB R&B

## 2017-06-19 PROCEDURE — 6370000000 HC RX 637 (ALT 250 FOR IP): Performed by: PHYSICIAN ASSISTANT

## 2017-06-19 RX ADMIN — TRAMADOL HYDROCHLORIDE 50 MG: 50 TABLET, FILM COATED ORAL at 05:14

## 2017-06-19 RX ADMIN — METOPROLOL TARTRATE 25 MG: 25 TABLET, FILM COATED ORAL at 08:42

## 2017-06-19 RX ADMIN — BUPRENORPHINE AND NALOXONE 1.5 TABLET: 8; 2 TABLET SUBLINGUAL at 08:41

## 2017-06-19 RX ADMIN — HYDROXYZINE PAMOATE 50 MG: 25 CAPSULE ORAL at 08:41

## 2017-06-19 RX ADMIN — Medication 100 MG: at 08:41

## 2017-06-19 RX ADMIN — ATORVASTATIN CALCIUM 20 MG: 20 TABLET, FILM COATED ORAL at 20:15

## 2017-06-19 RX ADMIN — VITAMIN D, TAB 1000IU (100/BT) 1000 UNITS: 25 TAB at 17:19

## 2017-06-19 RX ADMIN — ENOXAPARIN SODIUM 40 MG: 40 INJECTION SUBCUTANEOUS at 08:41

## 2017-06-19 RX ADMIN — QUETIAPINE FUMARATE 100 MG: 100 TABLET, FILM COATED ORAL at 08:42

## 2017-06-19 RX ADMIN — METOPROLOL TARTRATE 25 MG: 25 TABLET, FILM COATED ORAL at 20:15

## 2017-06-19 RX ADMIN — ASPIRIN 81 MG: 81 TABLET, COATED ORAL at 08:42

## 2017-06-19 RX ADMIN — LISINOPRIL 10 MG: 10 TABLET ORAL at 08:42

## 2017-06-19 RX ADMIN — TOPIRAMATE 25 MG: 25 TABLET ORAL at 08:42

## 2017-06-19 RX ADMIN — CITALOPRAM HYDROBROMIDE 40 MG: 20 TABLET ORAL at 13:06

## 2017-06-19 RX ADMIN — ANALGESIC BALM: 1.74; 4.06 OINTMENT TOPICAL at 14:37

## 2017-06-19 RX ADMIN — QUETIAPINE FUMARATE 300 MG: 300 TABLET, EXTENDED RELEASE ORAL at 20:15

## 2017-06-19 RX ADMIN — VITAMIN D, TAB 1000IU (100/BT) 1000 UNITS: 25 TAB at 05:14

## 2017-06-19 RX ADMIN — ANALGESIC BALM: 1.74; 4.06 OINTMENT TOPICAL at 09:58

## 2017-06-19 RX ADMIN — TOPIRAMATE 25 MG: 25 TABLET ORAL at 20:15

## 2017-06-19 RX ADMIN — Medication 100 MG: at 20:15

## 2017-06-19 ASSESSMENT — PAIN DESCRIPTION - PAIN TYPE
TYPE: CHRONIC PAIN

## 2017-06-19 ASSESSMENT — PAIN DESCRIPTION - ORIENTATION
ORIENTATION: RIGHT

## 2017-06-19 ASSESSMENT — PAIN SCALES - GENERAL
PAINLEVEL_OUTOF10: 6
PAINLEVEL_OUTOF10: 5
PAINLEVEL_OUTOF10: 5
PAINLEVEL_OUTOF10: 8
PAINLEVEL_OUTOF10: 0
PAINLEVEL_OUTOF10: 8

## 2017-06-19 ASSESSMENT — PAIN DESCRIPTION - LOCATION
LOCATION: HEAD;GROIN

## 2017-06-19 ASSESSMENT — PAIN DESCRIPTION - DESCRIPTORS
DESCRIPTORS: ACHING
DESCRIPTORS: ACHING;SHARP
DESCRIPTORS: ACHING

## 2017-06-19 ASSESSMENT — PAIN DESCRIPTION - FREQUENCY: FREQUENCY: CONTINUOUS

## 2017-06-20 LAB
ANION GAP SERPL CALCULATED.3IONS-SCNC: 10 MEQ/L (ref 7–13)
BUN BLDV-MCNC: 32 MG/DL (ref 6–20)
CALCIUM SERPL-MCNC: 8.8 MG/DL (ref 8.6–10.2)
CHLORIDE BLD-SCNC: 103 MEQ/L (ref 98–107)
CO2: 23 MEQ/L (ref 22–29)
CREAT SERPL-MCNC: 1.37 MG/DL (ref 0.7–1.2)
GFR AFRICAN AMERICAN: >60
GFR NON-AFRICAN AMERICAN: 53.4
GLUCOSE BLD-MCNC: 91 MG/DL (ref 74–109)
POTASSIUM SERPL-SCNC: 4.3 MEQ/L (ref 3.5–5.1)
SODIUM BLD-SCNC: 136 MEQ/L (ref 132–144)

## 2017-06-20 PROCEDURE — 36415 COLL VENOUS BLD VENIPUNCTURE: CPT

## 2017-06-20 PROCEDURE — 97110 THERAPEUTIC EXERCISES: CPT

## 2017-06-20 PROCEDURE — 6370000000 HC RX 637 (ALT 250 FOR IP): Performed by: PHYSICIAN ASSISTANT

## 2017-06-20 PROCEDURE — 97116 GAIT TRAINING THERAPY: CPT

## 2017-06-20 PROCEDURE — 97530 THERAPEUTIC ACTIVITIES: CPT

## 2017-06-20 PROCEDURE — 97112 NEUROMUSCULAR REEDUCATION: CPT

## 2017-06-20 PROCEDURE — 6360000002 HC RX W HCPCS: Performed by: PHYSICIAN ASSISTANT

## 2017-06-20 PROCEDURE — 99232 SBSQ HOSP IP/OBS MODERATE 35: CPT | Performed by: PHYSICAL MEDICINE & REHABILITATION

## 2017-06-20 PROCEDURE — 80048 BASIC METABOLIC PNL TOTAL CA: CPT

## 2017-06-20 PROCEDURE — 1180000000 HC REHAB R&B

## 2017-06-20 PROCEDURE — 6370000000 HC RX 637 (ALT 250 FOR IP): Performed by: PHYSICAL MEDICINE & REHABILITATION

## 2017-06-20 PROCEDURE — 6370000000 HC RX 637 (ALT 250 FOR IP): Performed by: CLINICAL NURSE SPECIALIST

## 2017-06-20 RX ORDER — TRAMADOL HYDROCHLORIDE 50 MG/1
25 TABLET ORAL EVERY 6 HOURS PRN
Status: DISCONTINUED | OUTPATIENT
Start: 2017-06-20 | End: 2017-07-01 | Stop reason: HOSPADM

## 2017-06-20 RX ADMIN — BUPRENORPHINE AND NALOXONE 1.5 TABLET: 8; 2 TABLET SUBLINGUAL at 09:52

## 2017-06-20 RX ADMIN — QUETIAPINE FUMARATE 100 MG: 100 TABLET, FILM COATED ORAL at 09:49

## 2017-06-20 RX ADMIN — Medication 100 MG: at 20:04

## 2017-06-20 RX ADMIN — CITALOPRAM HYDROBROMIDE 40 MG: 20 TABLET ORAL at 09:48

## 2017-06-20 RX ADMIN — METOPROLOL TARTRATE 25 MG: 25 TABLET, FILM COATED ORAL at 09:49

## 2017-06-20 RX ADMIN — ENOXAPARIN SODIUM 40 MG: 40 INJECTION SUBCUTANEOUS at 09:48

## 2017-06-20 RX ADMIN — Medication 100 MG: at 09:48

## 2017-06-20 RX ADMIN — ATORVASTATIN CALCIUM 20 MG: 20 TABLET, FILM COATED ORAL at 20:04

## 2017-06-20 RX ADMIN — VITAMIN D, TAB 1000IU (100/BT) 1000 UNITS: 25 TAB at 16:18

## 2017-06-20 RX ADMIN — QUETIAPINE FUMARATE 300 MG: 300 TABLET, EXTENDED RELEASE ORAL at 20:04

## 2017-06-20 RX ADMIN — TOPIRAMATE 25 MG: 25 TABLET ORAL at 09:49

## 2017-06-20 RX ADMIN — LISINOPRIL 10 MG: 10 TABLET ORAL at 09:49

## 2017-06-20 RX ADMIN — VITAMIN D, TAB 1000IU (100/BT) 1000 UNITS: 25 TAB at 06:18

## 2017-06-20 RX ADMIN — ASPIRIN 81 MG: 81 TABLET, COATED ORAL at 09:48

## 2017-06-20 RX ADMIN — TOPIRAMATE 25 MG: 25 TABLET ORAL at 20:04

## 2017-06-20 ASSESSMENT — PAIN DESCRIPTION - FREQUENCY: FREQUENCY: CONTINUOUS

## 2017-06-20 ASSESSMENT — PAIN DESCRIPTION - ORIENTATION
ORIENTATION: RIGHT
ORIENTATION: MID

## 2017-06-20 ASSESSMENT — PAIN SCALES - GENERAL
PAINLEVEL_OUTOF10: 2
PAINLEVEL_OUTOF10: 6
PAINLEVEL_OUTOF10: 8

## 2017-06-20 ASSESSMENT — PAIN DESCRIPTION - PAIN TYPE
TYPE: CHRONIC PAIN
TYPE: CHRONIC PAIN

## 2017-06-20 ASSESSMENT — PAIN DESCRIPTION - LOCATION
LOCATION: HEAD
LOCATION: HEAD

## 2017-06-20 ASSESSMENT — PAIN DESCRIPTION - DESCRIPTORS
DESCRIPTORS: CONSTANT;POUNDING
DESCRIPTORS: CONSTANT

## 2017-06-21 LAB
ANION GAP SERPL CALCULATED.3IONS-SCNC: 12 MEQ/L (ref 7–13)
BUN BLDV-MCNC: 36 MG/DL (ref 6–20)
CALCIUM SERPL-MCNC: 8.6 MG/DL (ref 8.6–10.2)
CHLORIDE BLD-SCNC: 104 MEQ/L (ref 98–107)
CO2: 22 MEQ/L (ref 22–29)
CREAT SERPL-MCNC: 1.4 MG/DL (ref 0.7–1.2)
GFR AFRICAN AMERICAN: >60
GFR NON-AFRICAN AMERICAN: 52.1
GLUCOSE BLD-MCNC: 88 MG/DL (ref 74–109)
POTASSIUM SERPL-SCNC: 4.2 MEQ/L (ref 3.5–5.1)
SODIUM BLD-SCNC: 138 MEQ/L (ref 132–144)

## 2017-06-21 PROCEDURE — 97116 GAIT TRAINING THERAPY: CPT

## 2017-06-21 PROCEDURE — 99232 SBSQ HOSP IP/OBS MODERATE 35: CPT | Performed by: PHYSICAL MEDICINE & REHABILITATION

## 2017-06-21 PROCEDURE — 6370000000 HC RX 637 (ALT 250 FOR IP): Performed by: PHYSICIAN ASSISTANT

## 2017-06-21 PROCEDURE — 97110 THERAPEUTIC EXERCISES: CPT

## 2017-06-21 PROCEDURE — 6370000000 HC RX 637 (ALT 250 FOR IP): Performed by: PHYSICAL MEDICINE & REHABILITATION

## 2017-06-21 PROCEDURE — 80048 BASIC METABOLIC PNL TOTAL CA: CPT

## 2017-06-21 PROCEDURE — 97112 NEUROMUSCULAR REEDUCATION: CPT

## 2017-06-21 PROCEDURE — 6370000000 HC RX 637 (ALT 250 FOR IP): Performed by: CLINICAL NURSE SPECIALIST

## 2017-06-21 PROCEDURE — 36415 COLL VENOUS BLD VENIPUNCTURE: CPT

## 2017-06-21 PROCEDURE — 6360000002 HC RX W HCPCS: Performed by: PHYSICIAN ASSISTANT

## 2017-06-21 PROCEDURE — 97530 THERAPEUTIC ACTIVITIES: CPT

## 2017-06-21 PROCEDURE — 1180000000 HC REHAB R&B

## 2017-06-21 PROCEDURE — 97535 SELF CARE MNGMENT TRAINING: CPT

## 2017-06-21 RX ADMIN — VITAMIN D, TAB 1000IU (100/BT) 1000 UNITS: 25 TAB at 05:26

## 2017-06-21 RX ADMIN — BUPRENORPHINE AND NALOXONE 1.5 TABLET: 8; 2 TABLET SUBLINGUAL at 08:32

## 2017-06-21 RX ADMIN — ACETAMINOPHEN 650 MG: 325 TABLET ORAL at 08:32

## 2017-06-21 RX ADMIN — Medication 100 MG: at 14:06

## 2017-06-21 RX ADMIN — LISINOPRIL 10 MG: 10 TABLET ORAL at 14:06

## 2017-06-21 RX ADMIN — TOPIRAMATE 25 MG: 25 TABLET ORAL at 08:26

## 2017-06-21 RX ADMIN — VITAMIN D, TAB 1000IU (100/BT) 1000 UNITS: 25 TAB at 16:52

## 2017-06-21 RX ADMIN — METOPROLOL TARTRATE 25 MG: 25 TABLET, FILM COATED ORAL at 20:04

## 2017-06-21 RX ADMIN — ENOXAPARIN SODIUM 40 MG: 40 INJECTION SUBCUTANEOUS at 08:33

## 2017-06-21 RX ADMIN — TRAMADOL HYDROCHLORIDE 25 MG: 50 TABLET, FILM COATED ORAL at 08:31

## 2017-06-21 RX ADMIN — METOPROLOL TARTRATE 25 MG: 25 TABLET, FILM COATED ORAL at 08:31

## 2017-06-21 RX ADMIN — ATORVASTATIN CALCIUM 20 MG: 20 TABLET, FILM COATED ORAL at 20:03

## 2017-06-21 RX ADMIN — QUETIAPINE FUMARATE 100 MG: 100 TABLET, FILM COATED ORAL at 08:25

## 2017-06-21 RX ADMIN — ASPIRIN 81 MG: 81 TABLET, COATED ORAL at 08:26

## 2017-06-21 RX ADMIN — TOPIRAMATE 25 MG: 25 TABLET ORAL at 20:05

## 2017-06-21 RX ADMIN — QUETIAPINE FUMARATE 300 MG: 300 TABLET, EXTENDED RELEASE ORAL at 20:04

## 2017-06-21 RX ADMIN — Medication 100 MG: at 20:03

## 2017-06-21 RX ADMIN — ACETAMINOPHEN 650 MG: 325 TABLET ORAL at 14:06

## 2017-06-21 RX ADMIN — TRAMADOL HYDROCHLORIDE 25 MG: 50 TABLET, FILM COATED ORAL at 15:46

## 2017-06-21 RX ADMIN — CITALOPRAM HYDROBROMIDE 40 MG: 20 TABLET ORAL at 08:26

## 2017-06-21 ASSESSMENT — PAIN DESCRIPTION - DESCRIPTORS
DESCRIPTORS: CONSTANT
DESCRIPTORS: CONSTANT

## 2017-06-21 ASSESSMENT — PAIN SCALES - GENERAL
PAINLEVEL_OUTOF10: 5
PAINLEVEL_OUTOF10: 6
PAINLEVEL_OUTOF10: 7
PAINLEVEL_OUTOF10: 6
PAINLEVEL_OUTOF10: 2
PAINLEVEL_OUTOF10: 7

## 2017-06-21 ASSESSMENT — PAIN DESCRIPTION - ORIENTATION
ORIENTATION: RIGHT
ORIENTATION: RIGHT

## 2017-06-21 ASSESSMENT — PAIN DESCRIPTION - FREQUENCY: FREQUENCY: CONTINUOUS

## 2017-06-21 ASSESSMENT — PAIN DESCRIPTION - PAIN TYPE
TYPE: CHRONIC PAIN
TYPE: CHRONIC PAIN

## 2017-06-21 ASSESSMENT — PAIN DESCRIPTION - LOCATION
LOCATION: HEAD;GROIN
LOCATION: HEAD;GROIN

## 2017-06-22 LAB
ANION GAP SERPL CALCULATED.3IONS-SCNC: 11 MEQ/L (ref 7–13)
BUN BLDV-MCNC: 34 MG/DL (ref 6–20)
CALCIUM SERPL-MCNC: 8.5 MG/DL (ref 8.6–10.2)
CHLORIDE BLD-SCNC: 107 MEQ/L (ref 98–107)
CO2: 20 MEQ/L (ref 22–29)
CREAT SERPL-MCNC: 1.35 MG/DL (ref 0.7–1.2)
GFR AFRICAN AMERICAN: >60
GFR NON-AFRICAN AMERICAN: 54.3
GLUCOSE BLD-MCNC: 91 MG/DL (ref 74–109)
POTASSIUM SERPL-SCNC: 4.1 MEQ/L (ref 3.5–5.1)
SODIUM BLD-SCNC: 138 MEQ/L (ref 132–144)

## 2017-06-22 PROCEDURE — 97112 NEUROMUSCULAR REEDUCATION: CPT

## 2017-06-22 PROCEDURE — 97116 GAIT TRAINING THERAPY: CPT

## 2017-06-22 PROCEDURE — 6370000000 HC RX 637 (ALT 250 FOR IP): Performed by: PHYSICIAN ASSISTANT

## 2017-06-22 PROCEDURE — 97110 THERAPEUTIC EXERCISES: CPT

## 2017-06-22 PROCEDURE — 6370000000 HC RX 637 (ALT 250 FOR IP): Performed by: CLINICAL NURSE SPECIALIST

## 2017-06-22 PROCEDURE — 6360000002 HC RX W HCPCS: Performed by: PHYSICIAN ASSISTANT

## 2017-06-22 PROCEDURE — 97530 THERAPEUTIC ACTIVITIES: CPT

## 2017-06-22 PROCEDURE — 1180000000 HC REHAB R&B

## 2017-06-22 PROCEDURE — 80048 BASIC METABOLIC PNL TOTAL CA: CPT

## 2017-06-22 PROCEDURE — 6370000000 HC RX 637 (ALT 250 FOR IP): Performed by: PHYSICAL MEDICINE & REHABILITATION

## 2017-06-22 PROCEDURE — 99232 SBSQ HOSP IP/OBS MODERATE 35: CPT | Performed by: PHYSICAL MEDICINE & REHABILITATION

## 2017-06-22 PROCEDURE — 36415 COLL VENOUS BLD VENIPUNCTURE: CPT

## 2017-06-22 RX ADMIN — TOPIRAMATE 25 MG: 25 TABLET ORAL at 08:30

## 2017-06-22 RX ADMIN — QUETIAPINE FUMARATE 100 MG: 100 TABLET, FILM COATED ORAL at 08:28

## 2017-06-22 RX ADMIN — BUPRENORPHINE AND NALOXONE 1.5 TABLET: 8; 2 TABLET SUBLINGUAL at 08:30

## 2017-06-22 RX ADMIN — ATORVASTATIN CALCIUM 20 MG: 20 TABLET, FILM COATED ORAL at 20:30

## 2017-06-22 RX ADMIN — TRAMADOL HYDROCHLORIDE 25 MG: 50 TABLET, FILM COATED ORAL at 08:29

## 2017-06-22 RX ADMIN — VITAMIN D, TAB 1000IU (100/BT) 1000 UNITS: 25 TAB at 06:09

## 2017-06-22 RX ADMIN — Medication 100 MG: at 08:31

## 2017-06-22 RX ADMIN — ENOXAPARIN SODIUM 40 MG: 40 INJECTION SUBCUTANEOUS at 08:31

## 2017-06-22 RX ADMIN — METOPROLOL TARTRATE 25 MG: 25 TABLET, FILM COATED ORAL at 08:29

## 2017-06-22 RX ADMIN — ACETAMINOPHEN 650 MG: 325 TABLET ORAL at 08:31

## 2017-06-22 RX ADMIN — TOPIRAMATE 25 MG: 25 TABLET ORAL at 20:30

## 2017-06-22 RX ADMIN — CITALOPRAM HYDROBROMIDE 40 MG: 20 TABLET ORAL at 08:29

## 2017-06-22 RX ADMIN — METOPROLOL TARTRATE 25 MG: 25 TABLET, FILM COATED ORAL at 20:29

## 2017-06-22 RX ADMIN — LISINOPRIL 10 MG: 10 TABLET ORAL at 08:28

## 2017-06-22 RX ADMIN — ACETAMINOPHEN 650 MG: 325 TABLET ORAL at 16:23

## 2017-06-22 RX ADMIN — VITAMIN D, TAB 1000IU (100/BT) 1000 UNITS: 25 TAB at 16:23

## 2017-06-22 RX ADMIN — QUETIAPINE FUMARATE 300 MG: 300 TABLET, EXTENDED RELEASE ORAL at 20:29

## 2017-06-22 RX ADMIN — ASPIRIN 81 MG: 81 TABLET, COATED ORAL at 08:28

## 2017-06-22 RX ADMIN — TRAMADOL HYDROCHLORIDE 25 MG: 50 TABLET, FILM COATED ORAL at 16:24

## 2017-06-22 ASSESSMENT — PAIN DESCRIPTION - ONSET
ONSET: ON-GOING

## 2017-06-22 ASSESSMENT — PAIN DESCRIPTION - LOCATION
LOCATION: HEAD
LOCATION: HEAD;GROIN
LOCATION: HEAD

## 2017-06-22 ASSESSMENT — PAIN DESCRIPTION - PROGRESSION
CLINICAL_PROGRESSION: NOT CHANGED
CLINICAL_PROGRESSION: GRADUALLY IMPROVING
CLINICAL_PROGRESSION: NOT CHANGED

## 2017-06-22 ASSESSMENT — PAIN DESCRIPTION - FREQUENCY
FREQUENCY: INTERMITTENT
FREQUENCY: CONTINUOUS
FREQUENCY: INTERMITTENT
FREQUENCY: CONTINUOUS
FREQUENCY: CONTINUOUS

## 2017-06-22 ASSESSMENT — PAIN SCALES - GENERAL
PAINLEVEL_OUTOF10: 4
PAINLEVEL_OUTOF10: 0
PAINLEVEL_OUTOF10: 6
PAINLEVEL_OUTOF10: 7
PAINLEVEL_OUTOF10: 5
PAINLEVEL_OUTOF10: 3
PAINLEVEL_OUTOF10: 6
PAINLEVEL_OUTOF10: 7
PAINLEVEL_OUTOF10: 3
PAINLEVEL_OUTOF10: 5
PAINLEVEL_OUTOF10: 5

## 2017-06-22 ASSESSMENT — PAIN DESCRIPTION - DESCRIPTORS
DESCRIPTORS: HEADACHE
DESCRIPTORS: ACHING;HEADACHE
DESCRIPTORS: HEADACHE

## 2017-06-22 ASSESSMENT — PAIN DESCRIPTION - ORIENTATION
ORIENTATION: RIGHT
ORIENTATION: MID

## 2017-06-22 ASSESSMENT — PAIN DESCRIPTION - PAIN TYPE
TYPE: CHRONIC PAIN

## 2017-06-23 LAB
ANION GAP SERPL CALCULATED.3IONS-SCNC: 12 MEQ/L (ref 7–13)
BUN BLDV-MCNC: 34 MG/DL (ref 6–20)
CALCIUM SERPL-MCNC: 8.6 MG/DL (ref 8.6–10.2)
CHLORIDE BLD-SCNC: 107 MEQ/L (ref 98–107)
CO2: 20 MEQ/L (ref 22–29)
CREAT SERPL-MCNC: 1.32 MG/DL (ref 0.7–1.2)
GFR AFRICAN AMERICAN: >60
GFR NON-AFRICAN AMERICAN: 55.7
GLUCOSE BLD-MCNC: 91 MG/DL (ref 74–109)
POTASSIUM SERPL-SCNC: 4.1 MEQ/L (ref 3.5–5.1)
SODIUM BLD-SCNC: 139 MEQ/L (ref 132–144)

## 2017-06-23 PROCEDURE — 6370000000 HC RX 637 (ALT 250 FOR IP): Performed by: PHYSICAL MEDICINE & REHABILITATION

## 2017-06-23 PROCEDURE — 97116 GAIT TRAINING THERAPY: CPT | Performed by: INTERNAL MEDICINE

## 2017-06-23 PROCEDURE — 97112 NEUROMUSCULAR REEDUCATION: CPT

## 2017-06-23 PROCEDURE — 97535 SELF CARE MNGMENT TRAINING: CPT

## 2017-06-23 PROCEDURE — 80048 BASIC METABOLIC PNL TOTAL CA: CPT

## 2017-06-23 PROCEDURE — 6370000000 HC RX 637 (ALT 250 FOR IP): Performed by: PHYSICIAN ASSISTANT

## 2017-06-23 PROCEDURE — 1180000000 HC REHAB R&B

## 2017-06-23 PROCEDURE — 97530 THERAPEUTIC ACTIVITIES: CPT

## 2017-06-23 PROCEDURE — 99231 SBSQ HOSP IP/OBS SF/LOW 25: CPT | Performed by: PHYSICAL MEDICINE & REHABILITATION

## 2017-06-23 PROCEDURE — 97112 NEUROMUSCULAR REEDUCATION: CPT | Performed by: INTERNAL MEDICINE

## 2017-06-23 PROCEDURE — 97116 GAIT TRAINING THERAPY: CPT

## 2017-06-23 PROCEDURE — 97150 GROUP THERAPEUTIC PROCEDURES: CPT

## 2017-06-23 PROCEDURE — 92526 ORAL FUNCTION THERAPY: CPT

## 2017-06-23 PROCEDURE — 36415 COLL VENOUS BLD VENIPUNCTURE: CPT

## 2017-06-23 PROCEDURE — 6360000002 HC RX W HCPCS: Performed by: PHYSICIAN ASSISTANT

## 2017-06-23 RX ADMIN — ASPIRIN 81 MG: 81 TABLET, COATED ORAL at 10:18

## 2017-06-23 RX ADMIN — TOPIRAMATE 25 MG: 25 TABLET ORAL at 10:18

## 2017-06-23 RX ADMIN — ACETAMINOPHEN 650 MG: 325 TABLET ORAL at 17:22

## 2017-06-23 RX ADMIN — ATORVASTATIN CALCIUM 20 MG: 20 TABLET, FILM COATED ORAL at 20:23

## 2017-06-23 RX ADMIN — VITAMIN D, TAB 1000IU (100/BT) 1000 UNITS: 25 TAB at 05:29

## 2017-06-23 RX ADMIN — QUETIAPINE FUMARATE 300 MG: 300 TABLET, EXTENDED RELEASE ORAL at 10:18

## 2017-06-23 RX ADMIN — CITALOPRAM HYDROBROMIDE 40 MG: 20 TABLET ORAL at 10:17

## 2017-06-23 RX ADMIN — TOPIRAMATE 25 MG: 25 TABLET ORAL at 20:19

## 2017-06-23 RX ADMIN — Medication 100 MG: at 10:18

## 2017-06-23 RX ADMIN — ENOXAPARIN SODIUM 40 MG: 40 INJECTION SUBCUTANEOUS at 10:21

## 2017-06-23 RX ADMIN — ACETAMINOPHEN 650 MG: 325 TABLET ORAL at 10:28

## 2017-06-23 RX ADMIN — BUPRENORPHINE AND NALOXONE 1.5 TABLET: 8; 2 TABLET SUBLINGUAL at 10:17

## 2017-06-23 RX ADMIN — VITAMIN D, TAB 1000IU (100/BT) 1000 UNITS: 25 TAB at 15:46

## 2017-06-23 RX ADMIN — TRAMADOL HYDROCHLORIDE 25 MG: 50 TABLET, FILM COATED ORAL at 17:22

## 2017-06-23 RX ADMIN — TRAMADOL HYDROCHLORIDE 25 MG: 50 TABLET, FILM COATED ORAL at 10:28

## 2017-06-23 ASSESSMENT — PAIN SCALES - GENERAL
PAINLEVEL_OUTOF10: 7
PAINLEVEL_OUTOF10: 6
PAINLEVEL_OUTOF10: 1

## 2017-06-23 ASSESSMENT — PAIN DESCRIPTION - DESCRIPTORS: DESCRIPTORS: ACHING;HEADACHE

## 2017-06-23 ASSESSMENT — PAIN DESCRIPTION - LOCATION: LOCATION: HEAD;GROIN

## 2017-06-23 ASSESSMENT — PAIN DESCRIPTION - ORIENTATION: ORIENTATION: RIGHT

## 2017-06-23 ASSESSMENT — PAIN DESCRIPTION - FREQUENCY: FREQUENCY: INTERMITTENT

## 2017-06-23 ASSESSMENT — PAIN DESCRIPTION - PAIN TYPE: TYPE: CHRONIC PAIN

## 2017-06-24 LAB
ANION GAP SERPL CALCULATED.3IONS-SCNC: 11 MEQ/L (ref 7–13)
BUN BLDV-MCNC: 35 MG/DL (ref 6–20)
CALCIUM SERPL-MCNC: 8.5 MG/DL (ref 8.6–10.2)
CHLORIDE BLD-SCNC: 106 MEQ/L (ref 98–107)
CO2: 21 MEQ/L (ref 22–29)
CREAT SERPL-MCNC: 1.34 MG/DL (ref 0.7–1.2)
GFR AFRICAN AMERICAN: >60
GFR NON-AFRICAN AMERICAN: 54.7
GLUCOSE BLD-MCNC: 88 MG/DL (ref 74–109)
GLUCOSE BLD-MCNC: 93 MG/DL (ref 60–115)
PERFORMED ON: NORMAL
POTASSIUM SERPL-SCNC: 4.2 MEQ/L (ref 3.5–5.1)
SODIUM BLD-SCNC: 138 MEQ/L (ref 132–144)

## 2017-06-24 PROCEDURE — 1180000000 HC REHAB R&B

## 2017-06-24 PROCEDURE — 99232 SBSQ HOSP IP/OBS MODERATE 35: CPT | Performed by: PHYSICAL MEDICINE & REHABILITATION

## 2017-06-24 PROCEDURE — 6370000000 HC RX 637 (ALT 250 FOR IP): Performed by: CLINICAL NURSE SPECIALIST

## 2017-06-24 PROCEDURE — 6370000000 HC RX 637 (ALT 250 FOR IP): Performed by: PHYSICAL MEDICINE & REHABILITATION

## 2017-06-24 PROCEDURE — 80048 BASIC METABOLIC PNL TOTAL CA: CPT

## 2017-06-24 PROCEDURE — 6370000000 HC RX 637 (ALT 250 FOR IP): Performed by: PHYSICIAN ASSISTANT

## 2017-06-24 PROCEDURE — 97530 THERAPEUTIC ACTIVITIES: CPT

## 2017-06-24 PROCEDURE — 36415 COLL VENOUS BLD VENIPUNCTURE: CPT

## 2017-06-24 PROCEDURE — 6360000002 HC RX W HCPCS: Performed by: PHYSICIAN ASSISTANT

## 2017-06-24 PROCEDURE — 97116 GAIT TRAINING THERAPY: CPT

## 2017-06-24 RX ADMIN — METOPROLOL TARTRATE 25 MG: 25 TABLET, FILM COATED ORAL at 20:34

## 2017-06-24 RX ADMIN — ACETAMINOPHEN 650 MG: 325 TABLET ORAL at 16:16

## 2017-06-24 RX ADMIN — ATORVASTATIN CALCIUM 20 MG: 20 TABLET, FILM COATED ORAL at 20:34

## 2017-06-24 RX ADMIN — METOPROLOL TARTRATE 25 MG: 25 TABLET, FILM COATED ORAL at 08:36

## 2017-06-24 RX ADMIN — ASPIRIN 81 MG: 81 TABLET, COATED ORAL at 08:36

## 2017-06-24 RX ADMIN — LISINOPRIL 10 MG: 10 TABLET ORAL at 08:37

## 2017-06-24 RX ADMIN — ENOXAPARIN SODIUM 40 MG: 40 INJECTION SUBCUTANEOUS at 08:38

## 2017-06-24 RX ADMIN — ACETAMINOPHEN 650 MG: 325 TABLET ORAL at 08:36

## 2017-06-24 RX ADMIN — QUETIAPINE FUMARATE 100 MG: 100 TABLET, FILM COATED ORAL at 08:37

## 2017-06-24 RX ADMIN — VITAMIN D, TAB 1000IU (100/BT) 1000 UNITS: 25 TAB at 16:19

## 2017-06-24 RX ADMIN — VITAMIN D, TAB 1000IU (100/BT) 1000 UNITS: 25 TAB at 06:29

## 2017-06-24 RX ADMIN — BUPRENORPHINE AND NALOXONE 1.5 TABLET: 8; 2 TABLET SUBLINGUAL at 08:37

## 2017-06-24 RX ADMIN — Medication 100 MG: at 08:37

## 2017-06-24 RX ADMIN — TRAMADOL HYDROCHLORIDE 25 MG: 50 TABLET, FILM COATED ORAL at 08:36

## 2017-06-24 RX ADMIN — QUETIAPINE FUMARATE 300 MG: 300 TABLET, EXTENDED RELEASE ORAL at 20:33

## 2017-06-24 RX ADMIN — CITALOPRAM HYDROBROMIDE 40 MG: 20 TABLET ORAL at 08:36

## 2017-06-24 RX ADMIN — TOPIRAMATE 25 MG: 25 TABLET ORAL at 08:37

## 2017-06-24 RX ADMIN — TOPIRAMATE 25 MG: 25 TABLET ORAL at 20:34

## 2017-06-24 RX ADMIN — TRAMADOL HYDROCHLORIDE 25 MG: 50 TABLET, FILM COATED ORAL at 16:16

## 2017-06-24 ASSESSMENT — PAIN SCALES - GENERAL
PAINLEVEL_OUTOF10: 7
PAINLEVEL_OUTOF10: 2
PAINLEVEL_OUTOF10: 7
PAINLEVEL_OUTOF10: 1
PAINLEVEL_OUTOF10: 4

## 2017-06-24 ASSESSMENT — PAIN DESCRIPTION - LOCATION: LOCATION: HEAD

## 2017-06-25 LAB
ANION GAP SERPL CALCULATED.3IONS-SCNC: 11 MEQ/L (ref 7–13)
BUN BLDV-MCNC: 31 MG/DL (ref 6–20)
CALCIUM SERPL-MCNC: 8.5 MG/DL (ref 8.6–10.2)
CHLORIDE BLD-SCNC: 107 MEQ/L (ref 98–107)
CO2: 20 MEQ/L (ref 22–29)
CREAT SERPL-MCNC: 1.18 MG/DL (ref 0.7–1.2)
GFR AFRICAN AMERICAN: >60
GFR NON-AFRICAN AMERICAN: >60
GLUCOSE BLD-MCNC: 103 MG/DL (ref 60–115)
GLUCOSE BLD-MCNC: 86 MG/DL (ref 74–109)
PERFORMED ON: NORMAL
POTASSIUM SERPL-SCNC: 4 MEQ/L (ref 3.5–5.1)
SODIUM BLD-SCNC: 138 MEQ/L (ref 132–144)

## 2017-06-25 PROCEDURE — 99232 SBSQ HOSP IP/OBS MODERATE 35: CPT | Performed by: PHYSICAL MEDICINE & REHABILITATION

## 2017-06-25 PROCEDURE — 6370000000 HC RX 637 (ALT 250 FOR IP): Performed by: PHYSICIAN ASSISTANT

## 2017-06-25 PROCEDURE — 6360000002 HC RX W HCPCS: Performed by: PHYSICAL MEDICINE & REHABILITATION

## 2017-06-25 PROCEDURE — 6370000000 HC RX 637 (ALT 250 FOR IP): Performed by: PHYSICAL MEDICINE & REHABILITATION

## 2017-06-25 PROCEDURE — 1180000000 HC REHAB R&B

## 2017-06-25 PROCEDURE — 36415 COLL VENOUS BLD VENIPUNCTURE: CPT

## 2017-06-25 PROCEDURE — 6360000002 HC RX W HCPCS: Performed by: PHYSICIAN ASSISTANT

## 2017-06-25 PROCEDURE — 6370000000 HC RX 637 (ALT 250 FOR IP): Performed by: CLINICAL NURSE SPECIALIST

## 2017-06-25 PROCEDURE — 80048 BASIC METABOLIC PNL TOTAL CA: CPT

## 2017-06-25 RX ADMIN — ACETAMINOPHEN 650 MG: 325 TABLET ORAL at 09:43

## 2017-06-25 RX ADMIN — TRAMADOL HYDROCHLORIDE 25 MG: 50 TABLET, FILM COATED ORAL at 09:45

## 2017-06-25 RX ADMIN — BUPRENORPHINE AND NALOXONE 1.5 TABLET: 8; 2 TABLET SUBLINGUAL at 09:44

## 2017-06-25 RX ADMIN — VITAMIN D, TAB 1000IU (100/BT) 1000 UNITS: 25 TAB at 06:51

## 2017-06-25 RX ADMIN — LISINOPRIL 10 MG: 10 TABLET ORAL at 09:45

## 2017-06-25 RX ADMIN — CITALOPRAM HYDROBROMIDE 40 MG: 20 TABLET ORAL at 09:46

## 2017-06-25 RX ADMIN — TOPIRAMATE 25 MG: 25 TABLET ORAL at 21:29

## 2017-06-25 RX ADMIN — CYANOCOBALAMIN 1000 MCG: 1000 INJECTION INTRAMUSCULAR; SUBCUTANEOUS at 09:46

## 2017-06-25 RX ADMIN — VITAMIN D, TAB 1000IU (100/BT) 1000 UNITS: 25 TAB at 17:01

## 2017-06-25 RX ADMIN — ASPIRIN 81 MG: 81 TABLET, COATED ORAL at 09:46

## 2017-06-25 RX ADMIN — ATORVASTATIN CALCIUM 20 MG: 20 TABLET, FILM COATED ORAL at 21:29

## 2017-06-25 RX ADMIN — ENOXAPARIN SODIUM 40 MG: 40 INJECTION SUBCUTANEOUS at 09:46

## 2017-06-25 RX ADMIN — METOPROLOL TARTRATE 25 MG: 25 TABLET, FILM COATED ORAL at 21:29

## 2017-06-25 RX ADMIN — TOPIRAMATE 25 MG: 25 TABLET ORAL at 09:44

## 2017-06-25 RX ADMIN — METOPROLOL TARTRATE 25 MG: 25 TABLET, FILM COATED ORAL at 09:45

## 2017-06-25 RX ADMIN — QUETIAPINE FUMARATE 300 MG: 300 TABLET, EXTENDED RELEASE ORAL at 21:29

## 2017-06-25 RX ADMIN — QUETIAPINE FUMARATE 100 MG: 100 TABLET, FILM COATED ORAL at 09:46

## 2017-06-25 RX ADMIN — Medication 100 MG: at 09:44

## 2017-06-25 ASSESSMENT — ENCOUNTER SYMPTOMS
SHORTNESS OF BREATH: 0
VOMITING: 0
COUGH: 0
NAUSEA: 0

## 2017-06-25 ASSESSMENT — PAIN SCALES - GENERAL
PAINLEVEL_OUTOF10: 7
PAINLEVEL_OUTOF10: 7
PAINLEVEL_OUTOF10: 0

## 2017-06-26 LAB
ANION GAP SERPL CALCULATED.3IONS-SCNC: 9 MEQ/L (ref 7–13)
BUN BLDV-MCNC: 33 MG/DL (ref 6–20)
CALCIUM SERPL-MCNC: 8.6 MG/DL (ref 8.6–10.2)
CHLORIDE BLD-SCNC: 108 MEQ/L (ref 98–107)
CO2: 21 MEQ/L (ref 22–29)
CREAT SERPL-MCNC: 1.19 MG/DL (ref 0.7–1.2)
GFR AFRICAN AMERICAN: >60
GFR NON-AFRICAN AMERICAN: >60
GLUCOSE BLD-MCNC: 105 MG/DL (ref 60–115)
GLUCOSE BLD-MCNC: 94 MG/DL (ref 74–109)
PERFORMED ON: NORMAL
POTASSIUM SERPL-SCNC: 4 MEQ/L (ref 3.5–5.1)
SODIUM BLD-SCNC: 138 MEQ/L (ref 132–144)

## 2017-06-26 PROCEDURE — 6370000000 HC RX 637 (ALT 250 FOR IP): Performed by: PHYSICAL MEDICINE & REHABILITATION

## 2017-06-26 PROCEDURE — 1180000000 HC REHAB R&B

## 2017-06-26 PROCEDURE — 97110 THERAPEUTIC EXERCISES: CPT

## 2017-06-26 PROCEDURE — 6370000000 HC RX 637 (ALT 250 FOR IP): Performed by: PHYSICIAN ASSISTANT

## 2017-06-26 PROCEDURE — 80048 BASIC METABOLIC PNL TOTAL CA: CPT

## 2017-06-26 PROCEDURE — 97535 SELF CARE MNGMENT TRAINING: CPT

## 2017-06-26 PROCEDURE — 6370000000 HC RX 637 (ALT 250 FOR IP): Performed by: CLINICAL NURSE SPECIALIST

## 2017-06-26 PROCEDURE — 97530 THERAPEUTIC ACTIVITIES: CPT

## 2017-06-26 PROCEDURE — 97116 GAIT TRAINING THERAPY: CPT

## 2017-06-26 PROCEDURE — 6360000002 HC RX W HCPCS: Performed by: PHYSICIAN ASSISTANT

## 2017-06-26 PROCEDURE — 99232 SBSQ HOSP IP/OBS MODERATE 35: CPT | Performed by: PHYSICAL MEDICINE & REHABILITATION

## 2017-06-26 PROCEDURE — 36415 COLL VENOUS BLD VENIPUNCTURE: CPT

## 2017-06-26 PROCEDURE — 97112 NEUROMUSCULAR REEDUCATION: CPT

## 2017-06-26 RX ADMIN — CITALOPRAM HYDROBROMIDE 40 MG: 20 TABLET ORAL at 10:02

## 2017-06-26 RX ADMIN — ATORVASTATIN CALCIUM 20 MG: 20 TABLET, FILM COATED ORAL at 21:47

## 2017-06-26 RX ADMIN — TOPIRAMATE 25 MG: 25 TABLET ORAL at 21:47

## 2017-06-26 RX ADMIN — QUETIAPINE FUMARATE 100 MG: 100 TABLET, FILM COATED ORAL at 10:01

## 2017-06-26 RX ADMIN — ENOXAPARIN SODIUM 40 MG: 40 INJECTION SUBCUTANEOUS at 10:00

## 2017-06-26 RX ADMIN — BUPRENORPHINE AND NALOXONE 1.5 TABLET: 8; 2 TABLET SUBLINGUAL at 10:01

## 2017-06-26 RX ADMIN — TOPIRAMATE 25 MG: 25 TABLET ORAL at 10:06

## 2017-06-26 RX ADMIN — Medication 100 MG: at 21:47

## 2017-06-26 RX ADMIN — LISINOPRIL 10 MG: 10 TABLET ORAL at 10:02

## 2017-06-26 RX ADMIN — VITAMIN D, TAB 1000IU (100/BT) 1000 UNITS: 25 TAB at 06:04

## 2017-06-26 RX ADMIN — VITAMIN D, TAB 1000IU (100/BT) 1000 UNITS: 25 TAB at 17:43

## 2017-06-26 RX ADMIN — Medication 100 MG: at 10:00

## 2017-06-26 RX ADMIN — ASPIRIN 81 MG: 81 TABLET, COATED ORAL at 10:02

## 2017-06-26 RX ADMIN — QUETIAPINE FUMARATE 300 MG: 300 TABLET, EXTENDED RELEASE ORAL at 21:49

## 2017-06-26 RX ADMIN — METOPROLOL TARTRATE 25 MG: 25 TABLET, FILM COATED ORAL at 10:01

## 2017-06-26 ASSESSMENT — PAIN SCALES - GENERAL
PAINLEVEL_OUTOF10: 7
PAINLEVEL_OUTOF10: 4
PAINLEVEL_OUTOF10: 6
PAINLEVEL_OUTOF10: 7
PAINLEVEL_OUTOF10: 0
PAINLEVEL_OUTOF10: 0

## 2017-06-26 ASSESSMENT — PAIN DESCRIPTION - LOCATION
LOCATION: GROIN
LOCATION: GROIN;HEAD
LOCATION: GROIN

## 2017-06-26 ASSESSMENT — PAIN DESCRIPTION - PAIN TYPE
TYPE: CHRONIC PAIN

## 2017-06-26 ASSESSMENT — PAIN DESCRIPTION - DESCRIPTORS
DESCRIPTORS: ACHING

## 2017-06-26 ASSESSMENT — PAIN DESCRIPTION - ORIENTATION: ORIENTATION: RIGHT

## 2017-06-27 LAB
AMPHETAMINE SCREEN, URINE: ABNORMAL
ANION GAP SERPL CALCULATED.3IONS-SCNC: 11 MEQ/L (ref 7–13)
BARBITURATE SCREEN URINE: ABNORMAL
BENZODIAZEPINE SCREEN, URINE: POSITIVE
BUN BLDV-MCNC: 37 MG/DL (ref 6–20)
CALCIUM SERPL-MCNC: 8.6 MG/DL (ref 8.6–10.2)
CANNABINOID SCREEN URINE: ABNORMAL
CHLORIDE BLD-SCNC: 107 MEQ/L (ref 98–107)
CO2: 20 MEQ/L (ref 22–29)
COCAINE METABOLITE SCREEN URINE: ABNORMAL
CREAT SERPL-MCNC: 1.51 MG/DL (ref 0.7–1.2)
GFR AFRICAN AMERICAN: 57.7
GFR NON-AFRICAN AMERICAN: 47.7
GLUCOSE BLD-MCNC: 94 MG/DL (ref 74–109)
Lab: ABNORMAL
OPIATE SCREEN URINE: ABNORMAL
PHENCYCLIDINE SCREEN URINE: ABNORMAL
POTASSIUM SERPL-SCNC: 4.1 MEQ/L (ref 3.5–5.1)
SODIUM BLD-SCNC: 138 MEQ/L (ref 132–144)

## 2017-06-27 PROCEDURE — 99232 SBSQ HOSP IP/OBS MODERATE 35: CPT | Performed by: PHYSICAL MEDICINE & REHABILITATION

## 2017-06-27 PROCEDURE — 97535 SELF CARE MNGMENT TRAINING: CPT

## 2017-06-27 PROCEDURE — 36415 COLL VENOUS BLD VENIPUNCTURE: CPT

## 2017-06-27 PROCEDURE — 6360000002 HC RX W HCPCS: Performed by: PHYSICIAN ASSISTANT

## 2017-06-27 PROCEDURE — 80048 BASIC METABOLIC PNL TOTAL CA: CPT

## 2017-06-27 PROCEDURE — 97110 THERAPEUTIC EXERCISES: CPT | Performed by: INTERNAL MEDICINE

## 2017-06-27 PROCEDURE — 2580000003 HC RX 258

## 2017-06-27 PROCEDURE — 1180000000 HC REHAB R&B

## 2017-06-27 PROCEDURE — 6370000000 HC RX 637 (ALT 250 FOR IP): Performed by: CLINICAL NURSE SPECIALIST

## 2017-06-27 PROCEDURE — 6370000000 HC RX 637 (ALT 250 FOR IP): Performed by: PHYSICAL MEDICINE & REHABILITATION

## 2017-06-27 PROCEDURE — 80307 DRUG TEST PRSMV CHEM ANLYZR: CPT

## 2017-06-27 PROCEDURE — 6370000000 HC RX 637 (ALT 250 FOR IP): Performed by: PHYSICIAN ASSISTANT

## 2017-06-27 PROCEDURE — 97112 NEUROMUSCULAR REEDUCATION: CPT

## 2017-06-27 PROCEDURE — 97530 THERAPEUTIC ACTIVITIES: CPT

## 2017-06-27 PROCEDURE — 97116 GAIT TRAINING THERAPY: CPT

## 2017-06-27 RX ORDER — LISINOPRIL 2.5 MG/1
5 TABLET ORAL DAILY
Status: DISCONTINUED | OUTPATIENT
Start: 2017-06-30 | End: 2017-06-29

## 2017-06-27 RX ORDER — SODIUM CHLORIDE 9 MG/ML
INJECTION, SOLUTION INTRAVENOUS
Status: COMPLETED
Start: 2017-06-27 | End: 2017-06-27

## 2017-06-27 RX ORDER — 0.9 % SODIUM CHLORIDE 0.9 %
1000 INTRAVENOUS SOLUTION INTRAVENOUS ONCE
Status: COMPLETED | OUTPATIENT
Start: 2017-06-27 | End: 2017-06-27

## 2017-06-27 RX ADMIN — SODIUM CHLORIDE 1000 ML: 9 INJECTION, SOLUTION INTRAVENOUS at 22:40

## 2017-06-27 RX ADMIN — BUPRENORPHINE AND NALOXONE 1.5 TABLET: 8; 2 TABLET SUBLINGUAL at 08:50

## 2017-06-27 RX ADMIN — Medication 100 MG: at 20:28

## 2017-06-27 RX ADMIN — Medication 100 MG: at 08:51

## 2017-06-27 RX ADMIN — ATORVASTATIN CALCIUM 20 MG: 20 TABLET, FILM COATED ORAL at 20:28

## 2017-06-27 RX ADMIN — QUETIAPINE FUMARATE 300 MG: 300 TABLET, EXTENDED RELEASE ORAL at 20:28

## 2017-06-27 RX ADMIN — ASPIRIN 81 MG: 81 TABLET, COATED ORAL at 08:51

## 2017-06-27 RX ADMIN — VITAMIN D, TAB 1000IU (100/BT) 1000 UNITS: 25 TAB at 17:00

## 2017-06-27 RX ADMIN — TOPIRAMATE 25 MG: 25 TABLET ORAL at 20:28

## 2017-06-27 RX ADMIN — CITALOPRAM HYDROBROMIDE 40 MG: 20 TABLET ORAL at 08:51

## 2017-06-27 RX ADMIN — QUETIAPINE FUMARATE 100 MG: 100 TABLET, FILM COATED ORAL at 08:50

## 2017-06-27 RX ADMIN — TOPIRAMATE 25 MG: 25 TABLET ORAL at 08:51

## 2017-06-27 RX ADMIN — VITAMIN D, TAB 1000IU (100/BT) 1000 UNITS: 25 TAB at 06:47

## 2017-06-27 RX ADMIN — ENOXAPARIN SODIUM 40 MG: 40 INJECTION SUBCUTANEOUS at 08:51

## 2017-06-27 RX ADMIN — Medication 1000 ML: at 22:40

## 2017-06-27 ASSESSMENT — PAIN SCALES - GENERAL
PAINLEVEL_OUTOF10: 4
PAINLEVEL_OUTOF10: 5
PAINLEVEL_OUTOF10: 5
PAINLEVEL_OUTOF10: 7

## 2017-06-27 ASSESSMENT — PAIN DESCRIPTION - ORIENTATION
ORIENTATION: RIGHT
ORIENTATION: RIGHT

## 2017-06-27 ASSESSMENT — PAIN DESCRIPTION - PAIN TYPE
TYPE: CHRONIC PAIN

## 2017-06-27 ASSESSMENT — PAIN DESCRIPTION - DESCRIPTORS
DESCRIPTORS: ACHING
DESCRIPTORS: ACHING

## 2017-06-27 ASSESSMENT — PAIN DESCRIPTION - PROGRESSION: CLINICAL_PROGRESSION: NOT CHANGED

## 2017-06-27 ASSESSMENT — PAIN DESCRIPTION - LOCATION
LOCATION: GROIN

## 2017-06-28 LAB
AMMONIA: 84 UMOL/L (ref 16–60)
ANION GAP SERPL CALCULATED.3IONS-SCNC: 9 MEQ/L (ref 7–13)
BUN BLDV-MCNC: 35 MG/DL (ref 6–20)
CALCIUM SERPL-MCNC: 8.3 MG/DL (ref 8.6–10.2)
CHLORIDE BLD-SCNC: 109 MEQ/L (ref 98–107)
CO2: 20 MEQ/L (ref 22–29)
CREAT SERPL-MCNC: 1.24 MG/DL (ref 0.7–1.2)
GFR AFRICAN AMERICAN: >60
GFR NON-AFRICAN AMERICAN: 59.9
GLUCOSE BLD-MCNC: 95 MG/DL (ref 74–109)
HCT VFR BLD CALC: 37.5 % (ref 42–52)
HEMOGLOBIN: 12.8 G/DL (ref 14–18)
MCH RBC QN AUTO: 30.7 PG (ref 27–31.3)
MCHC RBC AUTO-ENTMCNC: 34 % (ref 33–37)
MCV RBC AUTO: 90 FL (ref 80–100)
PDW BLD-RTO: 14.3 % (ref 11.5–14.5)
PLATELET # BLD: 116 K/UL (ref 130–400)
POTASSIUM SERPL-SCNC: 4.1 MEQ/L (ref 3.5–5.1)
RBC # BLD: 4.17 M/UL (ref 4.7–6.1)
SODIUM BLD-SCNC: 138 MEQ/L (ref 132–144)
WBC # BLD: 3.8 K/UL (ref 4.8–10.8)

## 2017-06-28 PROCEDURE — 2580000003 HC RX 258: Performed by: PHYSICIAN ASSISTANT

## 2017-06-28 PROCEDURE — 6370000000 HC RX 637 (ALT 250 FOR IP): Performed by: PHYSICIAN ASSISTANT

## 2017-06-28 PROCEDURE — 6360000002 HC RX W HCPCS: Performed by: PHYSICIAN ASSISTANT

## 2017-06-28 PROCEDURE — 82140 ASSAY OF AMMONIA: CPT

## 2017-06-28 PROCEDURE — 97530 THERAPEUTIC ACTIVITIES: CPT

## 2017-06-28 PROCEDURE — 97535 SELF CARE MNGMENT TRAINING: CPT

## 2017-06-28 PROCEDURE — 6370000000 HC RX 637 (ALT 250 FOR IP): Performed by: PHYSICAL MEDICINE & REHABILITATION

## 2017-06-28 PROCEDURE — 6370000000 HC RX 637 (ALT 250 FOR IP): Performed by: CLINICAL NURSE SPECIALIST

## 2017-06-28 PROCEDURE — 80307 DRUG TEST PRSMV CHEM ANLYZR: CPT

## 2017-06-28 PROCEDURE — 87086 URINE CULTURE/COLONY COUNT: CPT

## 2017-06-28 PROCEDURE — 99232 SBSQ HOSP IP/OBS MODERATE 35: CPT | Performed by: PHYSICAL MEDICINE & REHABILITATION

## 2017-06-28 PROCEDURE — 36415 COLL VENOUS BLD VENIPUNCTURE: CPT

## 2017-06-28 PROCEDURE — 1180000000 HC REHAB R&B

## 2017-06-28 PROCEDURE — 85027 COMPLETE CBC AUTOMATED: CPT

## 2017-06-28 PROCEDURE — 87077 CULTURE AEROBIC IDENTIFY: CPT

## 2017-06-28 PROCEDURE — 81003 URINALYSIS AUTO W/O SCOPE: CPT

## 2017-06-28 PROCEDURE — 80048 BASIC METABOLIC PNL TOTAL CA: CPT

## 2017-06-28 RX ORDER — LACTULOSE 10 G/15ML
20 SOLUTION ORAL 3 TIMES DAILY
Status: DISCONTINUED | OUTPATIENT
Start: 2017-06-28 | End: 2017-07-01 | Stop reason: HOSPADM

## 2017-06-28 RX ORDER — SODIUM CHLORIDE 9 MG/ML
INJECTION, SOLUTION INTRAVENOUS CONTINUOUS
Status: DISPENSED | OUTPATIENT
Start: 2017-06-28 | End: 2017-06-28

## 2017-06-28 RX ADMIN — QUETIAPINE FUMARATE 300 MG: 300 TABLET, EXTENDED RELEASE ORAL at 21:13

## 2017-06-28 RX ADMIN — SODIUM CHLORIDE: 9 INJECTION, SOLUTION INTRAVENOUS at 21:58

## 2017-06-28 RX ADMIN — VITAMIN D, TAB 1000IU (100/BT) 1000 UNITS: 25 TAB at 05:41

## 2017-06-28 RX ADMIN — ASPIRIN 81 MG: 81 TABLET, COATED ORAL at 08:33

## 2017-06-28 RX ADMIN — Medication 100 MG: at 08:32

## 2017-06-28 RX ADMIN — QUETIAPINE FUMARATE 100 MG: 100 TABLET, FILM COATED ORAL at 08:33

## 2017-06-28 RX ADMIN — ATORVASTATIN CALCIUM 20 MG: 20 TABLET, FILM COATED ORAL at 21:11

## 2017-06-28 RX ADMIN — TRAMADOL HYDROCHLORIDE 25 MG: 50 TABLET, FILM COATED ORAL at 08:35

## 2017-06-28 RX ADMIN — BUPRENORPHINE AND NALOXONE 1.5 TABLET: 8; 2 TABLET SUBLINGUAL at 08:32

## 2017-06-28 RX ADMIN — METOPROLOL TARTRATE 25 MG: 25 TABLET, FILM COATED ORAL at 08:33

## 2017-06-28 RX ADMIN — ACETAMINOPHEN 650 MG: 325 TABLET ORAL at 08:35

## 2017-06-28 RX ADMIN — LACTULOSE 20 G: 20 SOLUTION ORAL at 21:11

## 2017-06-28 RX ADMIN — ENOXAPARIN SODIUM 40 MG: 40 INJECTION SUBCUTANEOUS at 08:35

## 2017-06-28 RX ADMIN — SODIUM CHLORIDE: 9 INJECTION, SOLUTION INTRAVENOUS at 12:00

## 2017-06-28 RX ADMIN — VITAMIN D, TAB 1000IU (100/BT) 1000 UNITS: 25 TAB at 15:50

## 2017-06-28 RX ADMIN — ACETAMINOPHEN 650 MG: 325 TABLET ORAL at 15:12

## 2017-06-28 RX ADMIN — TOPIRAMATE 25 MG: 25 TABLET ORAL at 21:11

## 2017-06-28 RX ADMIN — TOPIRAMATE 25 MG: 25 TABLET ORAL at 08:33

## 2017-06-28 RX ADMIN — CITALOPRAM HYDROBROMIDE 40 MG: 20 TABLET ORAL at 08:33

## 2017-06-28 RX ADMIN — TRAMADOL HYDROCHLORIDE 25 MG: 50 TABLET, FILM COATED ORAL at 15:13

## 2017-06-28 ASSESSMENT — PAIN DESCRIPTION - PAIN TYPE
TYPE: CHRONIC PAIN
TYPE: CHRONIC PAIN
TYPE: ACUTE PAIN

## 2017-06-28 ASSESSMENT — PAIN DESCRIPTION - LOCATION
LOCATION: HEAD

## 2017-06-28 ASSESSMENT — PAIN DESCRIPTION - ORIENTATION: ORIENTATION: LOWER

## 2017-06-28 ASSESSMENT — PAIN SCALES - GENERAL
PAINLEVEL_OUTOF10: 4
PAINLEVEL_OUTOF10: 7
PAINLEVEL_OUTOF10: 0
PAINLEVEL_OUTOF10: 7
PAINLEVEL_OUTOF10: 8
PAINLEVEL_OUTOF10: 0
PAINLEVEL_OUTOF10: 0
PAINLEVEL_OUTOF10: 4

## 2017-06-28 ASSESSMENT — PAIN DESCRIPTION - DESCRIPTORS
DESCRIPTORS: ACHING
DESCRIPTORS: ACHING

## 2017-06-28 ASSESSMENT — PAIN DESCRIPTION - PROGRESSION: CLINICAL_PROGRESSION: NOT CHANGED

## 2017-06-29 LAB
ALBUMIN SERPL-MCNC: 3.5 G/DL (ref 3.9–4.9)
ALP BLD-CCNC: 44 U/L (ref 35–104)
ALT SERPL-CCNC: 37 U/L (ref 0–41)
AMMONIA: 65 UMOL/L (ref 16–60)
AMPHETAMINE SCREEN, URINE: ABNORMAL
ANION GAP SERPL CALCULATED.3IONS-SCNC: 7 MEQ/L (ref 7–13)
AST SERPL-CCNC: 29 U/L (ref 0–40)
BARBITURATE SCREEN URINE: ABNORMAL
BENZODIAZEPINE SCREEN, URINE: POSITIVE
BILIRUB SERPL-MCNC: 0.2 MG/DL (ref 0–1.2)
BILIRUBIN DIRECT: 0 MG/DL (ref 0–0.3)
BILIRUBIN URINE: NEGATIVE
BILIRUBIN, INDIRECT: 0.2 MG/DL (ref 0–0.6)
BLOOD, URINE: NEGATIVE
BUN BLDV-MCNC: 27 MG/DL (ref 6–20)
CALCIUM SERPL-MCNC: 8.3 MG/DL (ref 8.6–10.2)
CANNABINOID SCREEN URINE: ABNORMAL
CHLORIDE BLD-SCNC: 112 MEQ/L (ref 98–107)
CLARITY: CLEAR
CO2: 21 MEQ/L (ref 22–29)
COCAINE METABOLITE SCREEN URINE: ABNORMAL
COLOR: YELLOW
CREAT SERPL-MCNC: 1.29 MG/DL (ref 0.7–1.2)
GFR AFRICAN AMERICAN: >60
GFR NON-AFRICAN AMERICAN: 57.2
GLUCOSE BLD-MCNC: 90 MG/DL (ref 74–109)
GLUCOSE URINE: NEGATIVE MG/DL
KETONES, URINE: NEGATIVE MG/DL
LEUKOCYTE ESTERASE, URINE: NEGATIVE
Lab: ABNORMAL
NITRITE, URINE: NEGATIVE
OPIATE SCREEN URINE: ABNORMAL
PH UA: 6.5 (ref 5–9)
PHENCYCLIDINE SCREEN URINE: ABNORMAL
POTASSIUM SERPL-SCNC: 4.1 MEQ/L (ref 3.5–5.1)
PROTEIN UA: NEGATIVE MG/DL
SODIUM BLD-SCNC: 140 MEQ/L (ref 132–144)
SPECIFIC GRAVITY UA: 1.01 (ref 1–1.03)
TOTAL PROTEIN: 5.9 G/DL (ref 6.4–8.1)
UROBILINOGEN, URINE: 1 E.U./DL

## 2017-06-29 PROCEDURE — 6370000000 HC RX 637 (ALT 250 FOR IP): Performed by: PHYSICIAN ASSISTANT

## 2017-06-29 PROCEDURE — 97110 THERAPEUTIC EXERCISES: CPT

## 2017-06-29 PROCEDURE — 80076 HEPATIC FUNCTION PANEL: CPT

## 2017-06-29 PROCEDURE — 36415 COLL VENOUS BLD VENIPUNCTURE: CPT

## 2017-06-29 PROCEDURE — 97112 NEUROMUSCULAR REEDUCATION: CPT

## 2017-06-29 PROCEDURE — 2580000003 HC RX 258

## 2017-06-29 PROCEDURE — 1180000000 HC REHAB R&B

## 2017-06-29 PROCEDURE — 82140 ASSAY OF AMMONIA: CPT

## 2017-06-29 PROCEDURE — 99233 SBSQ HOSP IP/OBS HIGH 50: CPT | Performed by: PHYSICAL MEDICINE & REHABILITATION

## 2017-06-29 PROCEDURE — 6360000002 HC RX W HCPCS: Performed by: PHYSICIAN ASSISTANT

## 2017-06-29 PROCEDURE — 80048 BASIC METABOLIC PNL TOTAL CA: CPT

## 2017-06-29 PROCEDURE — 6370000000 HC RX 637 (ALT 250 FOR IP): Performed by: PHYSICAL MEDICINE & REHABILITATION

## 2017-06-29 PROCEDURE — 97530 THERAPEUTIC ACTIVITIES: CPT

## 2017-06-29 PROCEDURE — 97532 HC OT DEVELOP COGNITIVE SKILLS 15MIN: CPT

## 2017-06-29 PROCEDURE — 97116 GAIT TRAINING THERAPY: CPT

## 2017-06-29 PROCEDURE — 6370000000 HC RX 637 (ALT 250 FOR IP): Performed by: CLINICAL NURSE SPECIALIST

## 2017-06-29 RX ORDER — LISINOPRIL 2.5 MG/1
2.5 TABLET ORAL DAILY
Status: DISCONTINUED | OUTPATIENT
Start: 2017-06-30 | End: 2017-07-01 | Stop reason: HOSPADM

## 2017-06-29 RX ORDER — SODIUM CHLORIDE 9 MG/ML
INJECTION, SOLUTION INTRAVENOUS CONTINUOUS
Status: DISCONTINUED | OUTPATIENT
Start: 2017-06-29 | End: 2017-07-01

## 2017-06-29 RX ORDER — CITALOPRAM 20 MG/1
20 TABLET ORAL DAILY
Status: DISCONTINUED | OUTPATIENT
Start: 2017-06-30 | End: 2017-07-01 | Stop reason: HOSPADM

## 2017-06-29 RX ADMIN — LACTULOSE 20 G: 20 SOLUTION ORAL at 21:17

## 2017-06-29 RX ADMIN — TOPIRAMATE 25 MG: 25 TABLET ORAL at 09:31

## 2017-06-29 RX ADMIN — BUPRENORPHINE AND NALOXONE 1.5 TABLET: 8; 2 TABLET SUBLINGUAL at 09:28

## 2017-06-29 RX ADMIN — METOPROLOL TARTRATE 25 MG: 25 TABLET, FILM COATED ORAL at 09:31

## 2017-06-29 RX ADMIN — ACETAMINOPHEN 650 MG: 325 TABLET ORAL at 15:54

## 2017-06-29 RX ADMIN — Medication 100 MG: at 13:16

## 2017-06-29 RX ADMIN — QUETIAPINE FUMARATE 100 MG: 100 TABLET, FILM COATED ORAL at 09:30

## 2017-06-29 RX ADMIN — ASPIRIN 81 MG: 81 TABLET, COATED ORAL at 09:30

## 2017-06-29 RX ADMIN — ATORVASTATIN CALCIUM 20 MG: 20 TABLET, FILM COATED ORAL at 21:18

## 2017-06-29 RX ADMIN — Medication 100 MG: at 09:31

## 2017-06-29 RX ADMIN — TRAMADOL HYDROCHLORIDE 25 MG: 50 TABLET, FILM COATED ORAL at 15:54

## 2017-06-29 RX ADMIN — SODIUM CHLORIDE: 900 INJECTION, SOLUTION INTRAVENOUS at 18:46

## 2017-06-29 RX ADMIN — SODIUM CHLORIDE: 900 INJECTION, SOLUTION INTRAVENOUS at 09:31

## 2017-06-29 RX ADMIN — LACTULOSE 20 G: 20 SOLUTION ORAL at 09:40

## 2017-06-29 RX ADMIN — TOPIRAMATE 25 MG: 25 TABLET ORAL at 21:16

## 2017-06-29 RX ADMIN — TRAMADOL HYDROCHLORIDE 25 MG: 50 TABLET, FILM COATED ORAL at 09:36

## 2017-06-29 RX ADMIN — VITAMIN D, TAB 1000IU (100/BT) 1000 UNITS: 25 TAB at 09:30

## 2017-06-29 RX ADMIN — CITALOPRAM HYDROBROMIDE 40 MG: 20 TABLET ORAL at 09:29

## 2017-06-29 RX ADMIN — TRAMADOL HYDROCHLORIDE 25 MG: 50 TABLET, FILM COATED ORAL at 21:58

## 2017-06-29 RX ADMIN — LACTULOSE 20 G: 20 SOLUTION ORAL at 15:50

## 2017-06-29 RX ADMIN — QUETIAPINE FUMARATE 300 MG: 300 TABLET, EXTENDED RELEASE ORAL at 21:17

## 2017-06-29 RX ADMIN — ACETAMINOPHEN 650 MG: 325 TABLET ORAL at 09:36

## 2017-06-29 RX ADMIN — METOPROLOL TARTRATE 12.5 MG: 25 TABLET, FILM COATED ORAL at 21:16

## 2017-06-29 RX ADMIN — ANALGESIC BALM: 1.74; 4.06 OINTMENT TOPICAL at 21:16

## 2017-06-29 RX ADMIN — VITAMIN D, TAB 1000IU (100/BT) 1000 UNITS: 25 TAB at 15:50

## 2017-06-29 ASSESSMENT — PAIN SCALES - GENERAL
PAINLEVEL_OUTOF10: 7
PAINLEVEL_OUTOF10: 0
PAINLEVEL_OUTOF10: 7
PAINLEVEL_OUTOF10: 0
PAINLEVEL_OUTOF10: 0
PAINLEVEL_OUTOF10: 7
PAINLEVEL_OUTOF10: 0
PAINLEVEL_OUTOF10: 7
PAINLEVEL_OUTOF10: 4
PAINLEVEL_OUTOF10: 3
PAINLEVEL_OUTOF10: 8
PAINLEVEL_OUTOF10: 0
PAINLEVEL_OUTOF10: 7
PAINLEVEL_OUTOF10: 0

## 2017-06-29 ASSESSMENT — PAIN DESCRIPTION - DESCRIPTORS: DESCRIPTORS: ACHING

## 2017-06-29 ASSESSMENT — PAIN DESCRIPTION - LOCATION: LOCATION: HEAD;GROIN

## 2017-06-29 ASSESSMENT — PAIN DESCRIPTION - PAIN TYPE: TYPE: CHRONIC PAIN

## 2017-06-29 ASSESSMENT — PAIN DESCRIPTION - ORIENTATION: ORIENTATION: RIGHT

## 2017-06-30 LAB
ALBUMIN SERPL-MCNC: 3.7 G/DL (ref 3.9–4.9)
ALP BLD-CCNC: 43 U/L (ref 35–104)
ALT SERPL-CCNC: 49 U/L (ref 0–41)
AMMONIA: 60 UMOL/L (ref 16–60)
ANION GAP SERPL CALCULATED.3IONS-SCNC: 14 MEQ/L (ref 7–13)
AST SERPL-CCNC: 39 U/L (ref 0–40)
BILIRUB SERPL-MCNC: 0.3 MG/DL (ref 0–1.2)
BUN BLDV-MCNC: 18 MG/DL (ref 6–20)
CALCIUM SERPL-MCNC: 8.8 MG/DL (ref 8.6–10.2)
CHLORIDE BLD-SCNC: 107 MEQ/L (ref 98–107)
CO2: 16 MEQ/L (ref 22–29)
CREAT SERPL-MCNC: 1.1 MG/DL (ref 0.7–1.2)
GFR AFRICAN AMERICAN: >60
GFR NON-AFRICAN AMERICAN: >60
GLOBULIN: 2.6 G/DL (ref 2.3–3.5)
GLUCOSE BLD-MCNC: 91 MG/DL (ref 74–109)
ORGANISM: ABNORMAL
POTASSIUM SERPL-SCNC: 4 MEQ/L (ref 3.5–5.1)
SODIUM BLD-SCNC: 137 MEQ/L (ref 132–144)
TOTAL PROTEIN: 6.3 G/DL (ref 6.4–8.1)
URINE CULTURE, ROUTINE: ABNORMAL

## 2017-06-30 PROCEDURE — 80053 COMPREHEN METABOLIC PANEL: CPT

## 2017-06-30 PROCEDURE — 2580000003 HC RX 258

## 2017-06-30 PROCEDURE — 6370000000 HC RX 637 (ALT 250 FOR IP): Performed by: PHYSICIAN ASSISTANT

## 2017-06-30 PROCEDURE — 97530 THERAPEUTIC ACTIVITIES: CPT

## 2017-06-30 PROCEDURE — 6370000000 HC RX 637 (ALT 250 FOR IP): Performed by: PHYSICAL MEDICINE & REHABILITATION

## 2017-06-30 PROCEDURE — 97112 NEUROMUSCULAR REEDUCATION: CPT

## 2017-06-30 PROCEDURE — 97116 GAIT TRAINING THERAPY: CPT

## 2017-06-30 PROCEDURE — 99232 SBSQ HOSP IP/OBS MODERATE 35: CPT | Performed by: PHYSICAL MEDICINE & REHABILITATION

## 2017-06-30 PROCEDURE — 82140 ASSAY OF AMMONIA: CPT

## 2017-06-30 PROCEDURE — 36415 COLL VENOUS BLD VENIPUNCTURE: CPT

## 2017-06-30 PROCEDURE — 1180000000 HC REHAB R&B

## 2017-06-30 PROCEDURE — 97535 SELF CARE MNGMENT TRAINING: CPT | Performed by: INTERNAL MEDICINE

## 2017-06-30 PROCEDURE — 6370000000 HC RX 637 (ALT 250 FOR IP): Performed by: CLINICAL NURSE SPECIALIST

## 2017-06-30 PROCEDURE — 6360000002 HC RX W HCPCS: Performed by: PHYSICIAN ASSISTANT

## 2017-06-30 RX ORDER — LISINOPRIL 2.5 MG/1
2.5 TABLET ORAL DAILY
Qty: 30 TABLET | Refills: 3 | Status: SHIPPED | OUTPATIENT
Start: 2017-06-30

## 2017-06-30 RX ORDER — SODIUM CHLORIDE 9 MG/ML
INJECTION, SOLUTION INTRAVENOUS
Status: DISPENSED
Start: 2017-06-30 | End: 2017-06-30

## 2017-06-30 RX ORDER — LACTULOSE 10 G/15ML
20 SOLUTION ORAL 3 TIMES DAILY
Qty: 946 ML | Refills: 3 | Status: ON HOLD | OUTPATIENT
Start: 2017-06-30 | End: 2017-10-06

## 2017-06-30 RX ORDER — CITALOPRAM 20 MG/1
20 TABLET ORAL DAILY
Qty: 30 TABLET | Refills: 3 | Status: ON HOLD | OUTPATIENT
Start: 2017-06-30 | End: 2017-10-25 | Stop reason: HOSPADM

## 2017-06-30 RX ADMIN — ASPIRIN 81 MG: 81 TABLET, COATED ORAL at 09:14

## 2017-06-30 RX ADMIN — ATORVASTATIN CALCIUM 20 MG: 20 TABLET, FILM COATED ORAL at 20:29

## 2017-06-30 RX ADMIN — SODIUM CHLORIDE 1000 ML: 900 INJECTION, SOLUTION INTRAVENOUS at 19:09

## 2017-06-30 RX ADMIN — TOPIRAMATE 25 MG: 25 TABLET ORAL at 09:15

## 2017-06-30 RX ADMIN — QUETIAPINE FUMARATE 300 MG: 300 TABLET, EXTENDED RELEASE ORAL at 20:31

## 2017-06-30 RX ADMIN — Medication 100 MG: at 09:14

## 2017-06-30 RX ADMIN — BUPRENORPHINE AND NALOXONE 1.5 TABLET: 8; 2 TABLET SUBLINGUAL at 09:16

## 2017-06-30 RX ADMIN — Medication 100 MG: at 13:27

## 2017-06-30 RX ADMIN — ANALGESIC BALM: 1.74; 4.06 OINTMENT TOPICAL at 20:24

## 2017-06-30 RX ADMIN — CITALOPRAM HYDROBROMIDE 20 MG: 20 TABLET ORAL at 09:16

## 2017-06-30 RX ADMIN — LISINOPRIL 2.5 MG: 2.5 TABLET ORAL at 09:16

## 2017-06-30 RX ADMIN — SODIUM CHLORIDE 1000 ML: 900 INJECTION, SOLUTION INTRAVENOUS at 05:25

## 2017-06-30 RX ADMIN — LACTULOSE 20 G: 20 SOLUTION ORAL at 09:14

## 2017-06-30 RX ADMIN — TRAMADOL HYDROCHLORIDE 25 MG: 50 TABLET, FILM COATED ORAL at 13:29

## 2017-06-30 RX ADMIN — VITAMIN D, TAB 1000IU (100/BT) 1000 UNITS: 25 TAB at 15:52

## 2017-06-30 RX ADMIN — METOPROLOL TARTRATE 12.5 MG: 25 TABLET, FILM COATED ORAL at 09:15

## 2017-06-30 RX ADMIN — LACTULOSE 20 G: 20 SOLUTION ORAL at 13:27

## 2017-06-30 RX ADMIN — METOPROLOL TARTRATE 12.5 MG: 25 TABLET, FILM COATED ORAL at 20:30

## 2017-06-30 RX ADMIN — VITAMIN D, TAB 1000IU (100/BT) 1000 UNITS: 25 TAB at 05:27

## 2017-06-30 RX ADMIN — TRAMADOL HYDROCHLORIDE 25 MG: 50 TABLET, FILM COATED ORAL at 20:32

## 2017-06-30 RX ADMIN — TOPIRAMATE 25 MG: 25 TABLET ORAL at 20:32

## 2017-06-30 RX ADMIN — QUETIAPINE FUMARATE 100 MG: 100 TABLET, FILM COATED ORAL at 09:16

## 2017-06-30 ASSESSMENT — PAIN SCALES - GENERAL
PAINLEVEL_OUTOF10: 7
PAINLEVEL_OUTOF10: 0
PAINLEVEL_OUTOF10: 7
PAINLEVEL_OUTOF10: 7
PAINLEVEL_OUTOF10: 0
PAINLEVEL_OUTOF10: 5

## 2017-06-30 ASSESSMENT — PAIN DESCRIPTION - LOCATION: LOCATION: HEAD;GROIN

## 2017-06-30 ASSESSMENT — PAIN DESCRIPTION - PAIN TYPE: TYPE: CHRONIC PAIN

## 2017-07-01 VITALS
HEIGHT: 69 IN | WEIGHT: 147 LBS | BODY MASS INDEX: 21.77 KG/M2 | SYSTOLIC BLOOD PRESSURE: 142 MMHG | RESPIRATION RATE: 16 BRPM | OXYGEN SATURATION: 98 % | DIASTOLIC BLOOD PRESSURE: 76 MMHG | TEMPERATURE: 98 F | HEART RATE: 57 BPM

## 2017-07-01 LAB
ALBUMIN SERPL-MCNC: 4 G/DL (ref 3.9–4.9)
ALP BLD-CCNC: 44 U/L (ref 35–104)
ALT SERPL-CCNC: 160 U/L (ref 0–41)
AMMONIA: 65 UMOL/L (ref 16–60)
ANION GAP SERPL CALCULATED.3IONS-SCNC: 13 MEQ/L (ref 7–13)
AST SERPL-CCNC: 145 U/L (ref 0–40)
BILIRUB SERPL-MCNC: 0.4 MG/DL (ref 0–1.2)
BUN BLDV-MCNC: 15 MG/DL (ref 6–20)
CALCIUM SERPL-MCNC: 8.2 MG/DL (ref 8.6–10.2)
CHLORIDE BLD-SCNC: 106 MEQ/L (ref 98–107)
CO2: 17 MEQ/L (ref 22–29)
CREAT SERPL-MCNC: 1.1 MG/DL (ref 0.7–1.2)
GFR AFRICAN AMERICAN: >60
GFR NON-AFRICAN AMERICAN: >60
GLOBULIN: 1.8 G/DL (ref 2.3–3.5)
GLUCOSE BLD-MCNC: 136 MG/DL (ref 74–109)
POTASSIUM SERPL-SCNC: 3.4 MEQ/L (ref 3.5–5.1)
SODIUM BLD-SCNC: 136 MEQ/L (ref 132–144)
TOTAL PROTEIN: 5.8 G/DL (ref 6.4–8.1)

## 2017-07-01 PROCEDURE — 97116 GAIT TRAINING THERAPY: CPT

## 2017-07-01 PROCEDURE — 36415 COLL VENOUS BLD VENIPUNCTURE: CPT

## 2017-07-01 PROCEDURE — 6360000002 HC RX W HCPCS: Performed by: PHYSICIAN ASSISTANT

## 2017-07-01 PROCEDURE — 6370000000 HC RX 637 (ALT 250 FOR IP): Performed by: PHYSICAL MEDICINE & REHABILITATION

## 2017-07-01 PROCEDURE — 6370000000 HC RX 637 (ALT 250 FOR IP): Performed by: INTERNAL MEDICINE

## 2017-07-01 PROCEDURE — 97112 NEUROMUSCULAR REEDUCATION: CPT

## 2017-07-01 PROCEDURE — 82140 ASSAY OF AMMONIA: CPT

## 2017-07-01 PROCEDURE — 80053 COMPREHEN METABOLIC PANEL: CPT

## 2017-07-01 PROCEDURE — 6370000000 HC RX 637 (ALT 250 FOR IP): Performed by: CLINICAL NURSE SPECIALIST

## 2017-07-01 PROCEDURE — 6370000000 HC RX 637 (ALT 250 FOR IP): Performed by: PHYSICIAN ASSISTANT

## 2017-07-01 PROCEDURE — 2580000003 HC RX 258

## 2017-07-01 RX ORDER — TRAMADOL HYDROCHLORIDE 50 MG/1
25 TABLET ORAL EVERY 6 HOURS PRN
Qty: 30 TABLET | Refills: 0 | Status: SHIPPED | OUTPATIENT
Start: 2017-07-01 | End: 2017-07-11

## 2017-07-01 RX ORDER — SODIUM CHLORIDE 9 MG/ML
INJECTION, SOLUTION INTRAVENOUS
Status: DISCONTINUED
Start: 2017-07-01 | End: 2017-07-01

## 2017-07-01 RX ORDER — TOPIRAMATE 25 MG/1
25 TABLET ORAL 2 TIMES DAILY
Qty: 60 TABLET | Refills: 3 | Status: ON HOLD | OUTPATIENT
Start: 2017-07-01 | End: 2018-01-29 | Stop reason: HOSPADM

## 2017-07-01 RX ORDER — QUETIAPINE 300 MG/1
300 TABLET, FILM COATED, EXTENDED RELEASE ORAL NIGHTLY
Qty: 30 TABLET | Refills: 3 | Status: ON HOLD | OUTPATIENT
Start: 2017-07-01 | End: 2017-10-25 | Stop reason: HOSPADM

## 2017-07-01 RX ORDER — POTASSIUM CHLORIDE 20 MEQ/1
40 TABLET, EXTENDED RELEASE ORAL ONCE
Status: COMPLETED | OUTPATIENT
Start: 2017-07-01 | End: 2017-07-01

## 2017-07-01 RX ORDER — QUETIAPINE FUMARATE 100 MG/1
100 TABLET, FILM COATED ORAL EVERY MORNING
Qty: 60 TABLET | Refills: 3 | Status: ON HOLD | OUTPATIENT
Start: 2017-07-01 | End: 2017-10-25 | Stop reason: HOSPADM

## 2017-07-01 RX ADMIN — BUPRENORPHINE AND NALOXONE 1.5 TABLET: 8; 2 TABLET SUBLINGUAL at 09:56

## 2017-07-01 RX ADMIN — TRAMADOL HYDROCHLORIDE 25 MG: 50 TABLET, FILM COATED ORAL at 05:25

## 2017-07-01 RX ADMIN — QUETIAPINE FUMARATE 100 MG: 100 TABLET, FILM COATED ORAL at 09:56

## 2017-07-01 RX ADMIN — TOPIRAMATE 25 MG: 25 TABLET ORAL at 09:57

## 2017-07-01 RX ADMIN — LACTULOSE 20 G: 20 SOLUTION ORAL at 09:57

## 2017-07-01 RX ADMIN — POTASSIUM CHLORIDE 40 MEQ: 1500 TABLET, EXTENDED RELEASE ORAL at 10:03

## 2017-07-01 RX ADMIN — LACTULOSE 20 G: 20 SOLUTION ORAL at 12:50

## 2017-07-01 RX ADMIN — LISINOPRIL 2.5 MG: 2.5 TABLET ORAL at 09:55

## 2017-07-01 RX ADMIN — Medication 100 MG: at 12:50

## 2017-07-01 RX ADMIN — CITALOPRAM HYDROBROMIDE 20 MG: 20 TABLET ORAL at 09:56

## 2017-07-01 RX ADMIN — ASPIRIN 81 MG: 81 TABLET, COATED ORAL at 09:56

## 2017-07-01 RX ADMIN — SODIUM CHLORIDE 1000 ML: 900 INJECTION, SOLUTION INTRAVENOUS at 05:29

## 2017-07-01 RX ADMIN — Medication 100 MG: at 09:55

## 2017-07-01 RX ADMIN — VITAMIN D, TAB 1000IU (100/BT) 1000 UNITS: 25 TAB at 05:25

## 2017-07-01 RX ADMIN — METOPROLOL TARTRATE 12.5 MG: 25 TABLET, FILM COATED ORAL at 09:57

## 2017-07-01 ASSESSMENT — PAIN SCALES - GENERAL
PAINLEVEL_OUTOF10: 7
PAINLEVEL_OUTOF10: 0
PAINLEVEL_OUTOF10: 4
PAINLEVEL_OUTOF10: 6

## 2017-07-01 ASSESSMENT — PAIN DESCRIPTION - DESCRIPTORS: DESCRIPTORS: ACHING

## 2017-07-01 ASSESSMENT — PAIN DESCRIPTION - PAIN TYPE: TYPE: CHRONIC PAIN

## 2017-07-01 ASSESSMENT — PAIN DESCRIPTION - ORIENTATION: ORIENTATION: RIGHT

## 2017-07-01 ASSESSMENT — PAIN DESCRIPTION - LOCATION: LOCATION: GROIN

## 2017-07-03 LAB
EKG ATRIAL RATE: 85 BPM
EKG P AXIS: 64 DEGREES
EKG P-R INTERVAL: 130 MS
EKG Q-T INTERVAL: 418 MS
EKG QRS DURATION: 146 MS
EKG QTC CALCULATION (BAZETT): 497 MS
EKG R AXIS: 88 DEGREES
EKG T AXIS: 2 DEGREES
EKG VENTRICULAR RATE: 85 BPM

## 2017-07-06 ENCOUNTER — TELEPHONE (OUTPATIENT)
Dept: PHYSICAL MEDICINE AND REHAB | Age: 57
End: 2017-07-06

## 2017-07-24 ENCOUNTER — OFFICE VISIT (OUTPATIENT)
Dept: UROLOGY | Age: 57
End: 2017-07-24

## 2017-07-24 VITALS
HEIGHT: 70 IN | BODY MASS INDEX: 22.9 KG/M2 | HEART RATE: 49 BPM | DIASTOLIC BLOOD PRESSURE: 82 MMHG | WEIGHT: 160 LBS | SYSTOLIC BLOOD PRESSURE: 120 MMHG

## 2017-07-24 DIAGNOSIS — R33.9 URINARY RETENTION: ICD-10-CM

## 2017-07-24 DIAGNOSIS — R30.0 DYSURIA: Primary | ICD-10-CM

## 2017-07-24 LAB
BILIRUBIN, POC: NORMAL
BLOOD URINE, POC: NORMAL
CLARITY, POC: CLEAR
COLOR, POC: YELLOW
GLUCOSE URINE, POC: NORMAL
KETONES, POC: NORMAL
LEUKOCYTE EST, POC: NORMAL
NITRITE, POC: NORMAL
PH, POC: 6
POST VOID RESIDUAL (PVR): >35 ML
PROTEIN, POC: NORMAL
SPECIFIC GRAVITY, POC: 1.01
UROBILINOGEN, POC: 1

## 2017-07-24 PROCEDURE — 51798 US URINE CAPACITY MEASURE: CPT | Performed by: UROLOGY

## 2017-07-24 PROCEDURE — 81003 URINALYSIS AUTO W/O SCOPE: CPT | Performed by: UROLOGY

## 2017-07-24 PROCEDURE — 99202 OFFICE O/P NEW SF 15 MIN: CPT | Performed by: UROLOGY

## 2017-07-24 RX ORDER — ORPHENADRINE CITRATE 100 MG/1
100 TABLET, EXTENDED RELEASE ORAL 2 TIMES DAILY
Status: ON HOLD | COMMUNITY
End: 2017-10-25 | Stop reason: HOSPADM

## 2017-10-03 ENCOUNTER — APPOINTMENT (OUTPATIENT)
Dept: GENERAL RADIOLOGY | Age: 57
End: 2017-10-03
Payer: COMMERCIAL

## 2017-10-03 ENCOUNTER — HOSPITAL ENCOUNTER (EMERGENCY)
Age: 57
Discharge: HOME | End: 2017-10-03
Attending: EMERGENCY MEDICINE
Payer: COMMERCIAL

## 2017-10-03 VITALS
TEMPERATURE: 98.8 F | HEIGHT: 70 IN | DIASTOLIC BLOOD PRESSURE: 84 MMHG | OXYGEN SATURATION: 98 % | RESPIRATION RATE: 18 BRPM | HEART RATE: 65 BPM | SYSTOLIC BLOOD PRESSURE: 118 MMHG | BODY MASS INDEX: 22.9 KG/M2 | WEIGHT: 160 LBS
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
## 2017-10-03 PROCEDURE — 71020 XR CHEST STANDARD TWO VW: CPT

## 2017-10-03 PROCEDURE — 96372 THER/PROPH/DIAG INJ SC/IM: CPT

## 2017-10-03 PROCEDURE — 99284 EMERGENCY DEPT VISIT MOD MDM: CPT

## 2017-10-03 PROCEDURE — 6360000002 HC RX W HCPCS: Performed by: EMERGENCY MEDICINE

## 2017-10-03 PROCEDURE — 93005 ELECTROCARDIOGRAM TRACING: CPT

## 2017-10-03 RX ORDER — GABAPENTIN 300 MG/1
300 CAPSULE ORAL 3 TIMES DAILY
Status: ON HOLD | COMMUNITY
End: 2018-01-29 | Stop reason: HOSPADM

## 2017-10-03 RX ORDER — KETOROLAC TROMETHAMINE 30 MG/ML
30 INJECTION, SOLUTION INTRAMUSCULAR; INTRAVENOUS ONCE
Status: COMPLETED | OUTPATIENT
Start: 2017-10-03 | End: 2017-10-03

## 2017-10-03 RX ADMIN — KETOROLAC TROMETHAMINE 30 MG: 30 INJECTION, SOLUTION INTRAMUSCULAR at 01:37

## 2017-10-03 ASSESSMENT — PAIN DESCRIPTION - PAIN TYPE: TYPE: ACUTE PAIN

## 2017-10-03 ASSESSMENT — ENCOUNTER SYMPTOMS
ABDOMINAL PAIN: 0
COUGH: 0
SORE THROAT: 0
CHEST TIGHTNESS: 0
VOMITING: 0
ABDOMINAL DISTENTION: 0
WHEEZING: 0
PHOTOPHOBIA: 0
EYE DISCHARGE: 0
SHORTNESS OF BREATH: 0

## 2017-10-03 ASSESSMENT — PAIN DESCRIPTION - LOCATION: LOCATION: BACK;HEAD;CHEST

## 2017-10-03 ASSESSMENT — PAIN SCALES - GENERAL: PAINLEVEL_OUTOF10: 8

## 2017-10-03 NOTE — ED AVS SNAPSHOT
Commonly known as:  TOPAMAX   Take 1 tablet by mouth 2 times daily       TRANXENE-T PO   Take by mouth       Vitamin D3 2000 units Caps   Take 2,000 Units by mouth daily       * Notice: This list has 2 medication(s) that are the same as other medications prescribed for you. Read the directions carefully, and ask your doctor or other care provider to review them with you. Allergies as of 10/3/2017     No Known Allergies      Immunizations as of 10/3/2017     No immunizations on file. After Visit Summary    This summary was created for you. Thank you for entrusting your care to us. The following information includes details about your hospital/visit stay along with steps you should take to help with your recovery once you leave the hospital.  In this packet, you will find information about the topics listed below:    · Instructions about your medications including a list of your home medications  · A summary of your hospital visit  · Follow-up appointments once you have left the hospital  · Your care plan at home      You may receive a survey regarding the care you received during your stay. Your input is valuable to us. We encourage you to complete and return your survey in the envelope provided. We hope you will choose us in the future for your healthcare needs. Patient Information     Patient Name SAMM Jones 1960      Care Provided at:     Name Address Phone       [address] [id number] [phone]            Your Visit    Here you will find information about your visit, including the reason for your visit. Please take this sheet with you when you visit your doctor or other health care provider in the future. It will help determine the best possible medical care for you at that time. If you have any questions once you leave the hospital, please call the department phone number listed below. Call the [facility] NOW ([phone])    Smoking harms nonsmokers. When nonsmokers are around people who smoke, they absorb nicotine, carbon monoxide, and other ingredients of tobacco smoke. DO NOT SMOKE AROUND CHILDREN     Children exposed to secondhand smoke are at an increased risk of:  Sudden Infant Death Syndrome (SIDS), acute respiratory infections, inflammation of the middle ear, and severe asthma. Over a longer time, it causes heart disease and lung cancer. There is no safe level of exposure to secondhand smoke. Important information for a smoker       SMOKING: QUIT SMOKING. THIS IS THE MOST IMPORTANT ACTION YOU CAN TAKE TO IMPROVE YOUR CURRENT AND FUTURE HEALTH. Call the [facility] NOW ([phone])    Smoking harms nonsmokers. When nonsmokers are around people who smoke, they absorb nicotine, carbon monoxide, and other ingredients of tobacco smoke. DO NOT SMOKE AROUND CHILDREN     Children exposed to secondhand smoke are at an increased risk of:  Sudden Infant Death Syndrome (SIDS), acute respiratory infections, inflammation of the middle ear, and severe asthma. Over a longer time, it causes heart disease and lung cancer. There is no safe level of exposure to secondhand smoke. [employer] Signup     Motion Engine allows you to send messages to your doctor, view your test results, renew your prescriptions, schedule appointments, view visit notes, and more. How Do I Sign Up? 1. In your Internet browser, go to [email or web address]  2. Click on the Sign Up Now link in the Sign In box. You will see the New Member Sign Up page. 3. Enter your Motion Engine Access Code exactly as it appears below. You will not need to use this code after youve completed the sign-up process. If you do not sign up before the expiration date, you must request a new code. Shop2 Access Code: [email or web address]  Expires: 12/2/2017  2:06 AM    4. Enter your Social Security Number ([id number]) and Date of Birth (mm/dd/yyyy) as indicated and click Submit. You will be taken to the next sign-up page. 5. Create a Visual Miningt ID. This will be your Shop2 login ID and cannot be changed, so think of one that is secure and easy to remember. 6. Create a Shop2 password. You can change your password at any time. 7. Enter your Password Reset Question and Answer. This can be used at a later time if you forget your password. 8. Enter your e-mail address. You will receive e-mail notification when new information is available in [address] 9. Click Sign Up. You can now view your medical record. Additional Information  If you have questions, please contact the physician practice where you receive care. Remember, Shop2 is NOT to be used for urgent needs. For medical emergencies, dial 911. For questions regarding your Visual Miningt account call [phone]. If you have a clinical question, please call your doctor's office. View your information online  ? Review your current list of  medications, immunization, and allergies. ? Review your future test results online . ? Review your discharge instructions provided by your caregivers at discharge    Certain functionality such as prescription refills, scheduling appointments or sending messages to your provider are not activated if your provider does not use CartMomo in his/her office    For questions regarding your Visual Miningt account call [phone]. If you have a clinical question, please call your doctor's office. The information on all pages of the After Visit Summary has been reviewed with me, the patient and/or responsible adult, by my health care provider(s). I had the opportunity to ask questions regarding this information.  I understand I should dispose of my armband safely at home to · You have signs of infection, such as:  ¨ Increased pain, swelling, warmth, or redness. ¨ Red streaks leading from the bruise. ¨ Pus draining from the bruise. ¨ A fever. · You have a bruise on your leg and signs of a blood clot, such as:  ¨ Increasing redness and swelling along with warmth, tenderness, and pain in the bruised area. ¨ Pain in your calf, back of the knee, thigh, or groin. ¨ Redness and swelling in your leg or groin. · Your pain gets worse. Watch closely for changes in your health, and be sure to contact your doctor if:  · You do not get better as expected. Where can you learn more? Go to [email or web address] and sign in to your ManyWho account. Enter [phone] in the Tremp[employer] box to learn more about \"Bruises: Care Instructions. \"     If you do not have an account, please click on the \"Sign Up Now\" link. Current as of: March 20, 2017  Content Version: 11.3  © 1475-7732 [employer]. Care instructions adapted under license by [facility] ([facility]). If you have questions about a medical condition or this instruction, always ask your healthcare professional. [address]  any warranty or liability for your use of this information.
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
## 2017-10-04 LAB
EKG ATRIAL RATE: 63 BPM
EKG P AXIS: 55 DEGREES
EKG P-R INTERVAL: 142 MS
EKG Q-T INTERVAL: 494 MS
EKG QRS DURATION: 148 MS
EKG QTC CALCULATION (BAZETT): 505 MS
EKG R AXIS: 31 DEGREES
EKG T AXIS: 7 DEGREES
EKG VENTRICULAR RATE: 63 BPM

## 2017-10-04 PROCEDURE — 93010 ELECTROCARDIOGRAM REPORT: CPT | Performed by: INTERNAL MEDICINE

## 2017-10-06 ENCOUNTER — HOSPITAL ENCOUNTER (OUTPATIENT)
Age: 57
Setting detail: OUTPATIENT SURGERY
Discharge: HOME | End: 2017-10-06
Attending: SURGERY | Admitting: SURGERY
Payer: COMMERCIAL

## 2017-10-06 VITALS
SYSTOLIC BLOOD PRESSURE: 121 MMHG | HEIGHT: 70 IN | RESPIRATION RATE: 18 BRPM | DIASTOLIC BLOOD PRESSURE: 69 MMHG | WEIGHT: 160 LBS | HEART RATE: 65 BPM | TEMPERATURE: 98 F | OXYGEN SATURATION: 97 % | BODY MASS INDEX: 22.9 KG/M2

## 2017-10-06 LAB
ANION GAP SERPL CALCULATED.3IONS-SCNC: 14 MEQ/L (ref 7–13)
BUN BLDV-MCNC: 14 MG/DL (ref 6–20)
CALCIUM SERPL-MCNC: 8.7 MG/DL (ref 8.6–10.2)
CHLORIDE BLD-SCNC: 100 MEQ/L (ref 98–107)
CO2: 22 MEQ/L (ref 22–29)
CREAT SERPL-MCNC: 1.53 MG/DL (ref 0.7–1.2)
GFR AFRICAN AMERICAN: 56.8
GFR NON-AFRICAN AMERICAN: 46.9
GLUCOSE BLD-MCNC: 89 MG/DL (ref 74–109)
HCT VFR BLD CALC: 39.5 % (ref 42–52)
HEMOGLOBIN: 13 G/DL (ref 14–18)
MCH RBC QN AUTO: 30.3 PG (ref 27–31.3)
MCHC RBC AUTO-ENTMCNC: 33 % (ref 33–37)
MCV RBC AUTO: 91.9 FL (ref 80–100)
PDW BLD-RTO: 14.1 % (ref 11.5–14.5)
PLATELET # BLD: 157 K/UL (ref 130–400)
POTASSIUM SERPL-SCNC: 4.2 MEQ/L (ref 3.5–5.1)
RBC # BLD: 4.3 M/UL (ref 4.7–6.1)
SODIUM BLD-SCNC: 136 MEQ/L (ref 132–144)
WBC # BLD: 4.6 K/UL (ref 4.8–10.8)

## 2017-10-06 PROCEDURE — 2580000003 HC RX 258: Performed by: STUDENT IN AN ORGANIZED HEALTH CARE EDUCATION/TRAINING PROGRAM

## 2017-10-06 PROCEDURE — 80048 BASIC METABOLIC PNL TOTAL CA: CPT

## 2017-10-06 PROCEDURE — 2500000003 HC RX 250 WO HCPCS: Performed by: STUDENT IN AN ORGANIZED HEALTH CARE EDUCATION/TRAINING PROGRAM

## 2017-10-06 PROCEDURE — 85027 COMPLETE CBC AUTOMATED: CPT

## 2017-10-06 RX ORDER — LIDOCAINE HYDROCHLORIDE 10 MG/ML
1 INJECTION, SOLUTION EPIDURAL; INFILTRATION; INTRACAUDAL; PERINEURAL
Status: COMPLETED | OUTPATIENT
Start: 2017-10-06 | End: 2017-10-06

## 2017-10-06 RX ORDER — SODIUM CHLORIDE 0.9 % (FLUSH) 0.9 %
10 SYRINGE (ML) INJECTION PRN
Status: DISCONTINUED | OUTPATIENT
Start: 2017-10-06 | End: 2017-10-06 | Stop reason: HOSPADM

## 2017-10-06 RX ORDER — SODIUM CHLORIDE 0.9 % (FLUSH) 0.9 %
10 SYRINGE (ML) INJECTION EVERY 12 HOURS SCHEDULED
Status: DISCONTINUED | OUTPATIENT
Start: 2017-10-06 | End: 2017-10-06 | Stop reason: HOSPADM

## 2017-10-06 RX ORDER — SODIUM CHLORIDE, SODIUM LACTATE, POTASSIUM CHLORIDE, CALCIUM CHLORIDE 600; 310; 30; 20 MG/100ML; MG/100ML; MG/100ML; MG/100ML
INJECTION, SOLUTION INTRAVENOUS CONTINUOUS
Status: DISCONTINUED | OUTPATIENT
Start: 2017-10-06 | End: 2017-10-06 | Stop reason: HOSPADM

## 2017-10-06 RX ADMIN — SODIUM CHLORIDE, POTASSIUM CHLORIDE, SODIUM LACTATE AND CALCIUM CHLORIDE: 600; 310; 30; 20 INJECTION, SOLUTION INTRAVENOUS at 11:04

## 2017-10-06 RX ADMIN — LIDOCAINE HYDROCHLORIDE 0.1 ML: 10 INJECTION, SOLUTION EPIDURAL; INFILTRATION; INTRACAUDAL; PERINEURAL at 11:02

## 2017-10-06 ASSESSMENT — PAIN - FUNCTIONAL ASSESSMENT: PAIN_FUNCTIONAL_ASSESSMENT: 0-10

## 2017-10-06 NOTE — PROGRESS NOTES
Pt is overly sleepy. When questioned; states did not sleep well last night. Denies Alcohol use but admits to using Heroin 3-4 days ago. Denies any other abuse listed in HX.

## 2017-10-20 ENCOUNTER — HOSPITAL ENCOUNTER (INPATIENT)
Age: 57
LOS: 4 days | Discharge: HOME | DRG: 753 | End: 2017-10-25
Attending: PSYCHIATRY & NEUROLOGY | Admitting: PSYCHIATRY & NEUROLOGY
Payer: COMMERCIAL

## 2017-10-20 DIAGNOSIS — F19.10 POLYSUBSTANCE ABUSE (HCC): ICD-10-CM

## 2017-10-20 DIAGNOSIS — F19.94 SUBSTANCE INDUCED MOOD DISORDER (HCC): Primary | ICD-10-CM

## 2017-10-20 LAB
ALBUMIN SERPL-MCNC: 4.6 G/DL (ref 3.9–4.9)
ALP BLD-CCNC: 68 U/L (ref 35–104)
ALT SERPL-CCNC: 68 U/L (ref 0–41)
AMPHETAMINE SCREEN, URINE: ABNORMAL
ANION GAP SERPL CALCULATED.3IONS-SCNC: 12 MEQ/L (ref 7–13)
AST SERPL-CCNC: 76 U/L (ref 0–40)
BACTERIA: NORMAL /HPF
BARBITURATE SCREEN URINE: ABNORMAL
BASOPHILS ABSOLUTE: 0 K/UL (ref 0–0.2)
BASOPHILS RELATIVE PERCENT: 0.4 %
BENZODIAZEPINE SCREEN, URINE: POSITIVE
BILIRUB SERPL-MCNC: 0.6 MG/DL (ref 0–1.2)
BILIRUBIN URINE: ABNORMAL
BLOOD, URINE: NEGATIVE
BUN BLDV-MCNC: 12 MG/DL (ref 6–20)
CALCIUM SERPL-MCNC: 9.2 MG/DL (ref 8.6–10.2)
CANNABINOID SCREEN URINE: POSITIVE
CHLORIDE BLD-SCNC: 103 MEQ/L (ref 98–107)
CK MB: 8.6 NG/ML (ref 0–6.7)
CLARITY: CLEAR
CO2: 26 MEQ/L (ref 22–29)
COCAINE METABOLITE SCREEN URINE: POSITIVE
COLOR: ABNORMAL
CREAT SERPL-MCNC: 1.15 MG/DL (ref 0.7–1.2)
CREATINE KINASE-MB INDEX: 1.4 % (ref 0–3.5)
EOSINOPHILS ABSOLUTE: 0.1 K/UL (ref 0–0.7)
EOSINOPHILS RELATIVE PERCENT: 1.1 %
EPITHELIAL CELLS, UA: NORMAL /HPF
ETHANOL PERCENT: NORMAL G/DL
ETHANOL: <10 MG/DL (ref 0–0.08)
GFR AFRICAN AMERICAN: >60
GFR NON-AFRICAN AMERICAN: >60
GLOBULIN: 3.1 G/DL (ref 2.3–3.5)
GLUCOSE BLD-MCNC: 83 MG/DL (ref 74–109)
GLUCOSE URINE: NEGATIVE MG/DL
HCT VFR BLD CALC: 41.4 % (ref 42–52)
HEMOGLOBIN: 13.9 G/DL (ref 14–18)
KETONES, URINE: ABNORMAL MG/DL
LEUKOCYTE ESTERASE, URINE: ABNORMAL
LYMPHOCYTES ABSOLUTE: 1.2 K/UL (ref 1–4.8)
LYMPHOCYTES RELATIVE PERCENT: 19.1 %
Lab: ABNORMAL
MCH RBC QN AUTO: 31 PG (ref 27–31.3)
MCHC RBC AUTO-ENTMCNC: 33.7 % (ref 33–37)
MCV RBC AUTO: 92 FL (ref 80–100)
MONOCYTES ABSOLUTE: 0.4 K/UL (ref 0.2–0.8)
MONOCYTES RELATIVE PERCENT: 6.2 %
MUCUS: PRESENT
NEUTROPHILS ABSOLUTE: 4.8 K/UL (ref 1.4–6.5)
NEUTROPHILS RELATIVE PERCENT: 73.2 %
NITRITE, URINE: NEGATIVE
OPIATE SCREEN URINE: ABNORMAL
PDW BLD-RTO: 14.2 % (ref 11.5–14.5)
PH UA: 6.5 (ref 5–9)
PHENCYCLIDINE SCREEN URINE: ABNORMAL
PLATELET # BLD: 199 K/UL (ref 130–400)
POTASSIUM SERPL-SCNC: 3.4 MEQ/L (ref 3.5–5.1)
PROTEIN UA: 30 MG/DL
RBC # BLD: 4.5 M/UL (ref 4.7–6.1)
RBC UA: NORMAL /HPF (ref 0–2)
SODIUM BLD-SCNC: 141 MEQ/L (ref 132–144)
SPECIFIC GRAVITY UA: 1.02 (ref 1–1.03)
TOTAL CK: 617 U/L (ref 0–190)
TOTAL PROTEIN: 7.7 G/DL (ref 6.4–8.1)
TSH SERPL DL<=0.05 MIU/L-ACNC: 2.14 UIU/ML (ref 0.27–4.2)
UROBILINOGEN, URINE: 2 E.U./DL
WBC # BLD: 6.5 K/UL (ref 4.8–10.8)
WBC UA: NORMAL /HPF (ref 0–5)

## 2017-10-20 PROCEDURE — 80307 DRUG TEST PRSMV CHEM ANLYZR: CPT

## 2017-10-20 PROCEDURE — 84443 ASSAY THYROID STIM HORMONE: CPT

## 2017-10-20 PROCEDURE — 82553 CREATINE MB FRACTION: CPT

## 2017-10-20 PROCEDURE — 99285 EMERGENCY DEPT VISIT HI MDM: CPT

## 2017-10-20 PROCEDURE — 36415 COLL VENOUS BLD VENIPUNCTURE: CPT

## 2017-10-20 PROCEDURE — G0480 DRUG TEST DEF 1-7 CLASSES: HCPCS

## 2017-10-20 PROCEDURE — 85025 COMPLETE CBC W/AUTO DIFF WBC: CPT

## 2017-10-20 PROCEDURE — 82550 ASSAY OF CK (CPK): CPT

## 2017-10-20 PROCEDURE — 81001 URINALYSIS AUTO W/SCOPE: CPT

## 2017-10-20 PROCEDURE — 80053 COMPREHEN METABOLIC PANEL: CPT

## 2017-10-20 RX ORDER — ATORVASTATIN CALCIUM 80 MG/1
80 TABLET, FILM COATED ORAL NIGHTLY
COMMUNITY

## 2017-10-20 ASSESSMENT — ENCOUNTER SYMPTOMS
COLOR CHANGE: 0
SHORTNESS OF BREATH: 0
ABDOMINAL PAIN: 0
CONSTIPATION: 0
ABDOMINAL DISTENTION: 0
RHINORRHEA: 0
SORE THROAT: 0
EYE DISCHARGE: 0

## 2017-10-20 NOTE — ED NOTES
[address] called, Raoul Grey requesting medical clearance notes and noted that pt's K = 3.4 and CK elevated at 617.  Informed will discuss with ELIAZAR Doty RN  10/20/17 [address], RN  10/20/17 9779

## 2017-10-20 NOTE — ED NOTES
[address] [facility] to find placement for pt on dual diagnosis unit per Dr Vinnie Mcgraw orders at [facility] or [facility], [address] [place] [address]  10/20/17 6035

## 2017-10-20 NOTE — ED NOTES
Chart to Dr Trey Hobbs and Nell TOUSSAINT. [facility]  Willia Plaster  10/20/17 4396

## 2017-10-20 NOTE — ED PROVIDER NOTES
buprenorphine-naloxone (SUBOXONE) 8-2 MG SUBL SL tablet Place 0.5 tablets under the tongue three times daily       albuterol (PROAIR HFA) 108 (90 BASE) MCG/ACT inhaler Inhale 2 puffs into the lungs every 6 hours as needed for Wheezing. Qty: 1 Inhaler, Refills: 6      orphenadrine (NORFLEX) 100 MG extended release tablet Take 100 mg by mouth 2 times daily      QUEtiapine (SEROQUEL) 100 MG tablet Take 1 tablet by mouth every morning  Qty: 60 tablet, Refills: 3      lisinopril (PRINIVIL;ZESTRIL) 2.5 MG tablet Take 1 tablet by mouth daily  Qty: 30 tablet, Refills: 3      clopidogrel (PLAVIX) 75 MG tablet Take 75 mg by mouth daily             ALLERGIES     Review of patient's allergies indicates no known allergies. FAMILY HISTORY       Family History   Problem Relation Age of Onset    Family history unknown: Yes          SOCIAL HISTORY       Social History     Social History    Marital status:      Spouse name: N/A    Number of children: N/A    Years of education: N/A     Social History Main Topics    Smoking status: Current Every Day Smoker     Packs/day: 0.50     Years: 45.00     Types: Cigarettes    Smokeless tobacco: Not on file    Alcohol use Yes      Comment: social    Drug use:      Frequency: 1.0 time per week     Types: Opiates , Cocaine, Other-see comments      Comment: heroin, crack    Sexual activity: Yes     Partners: Female     Other Topics Concern    Not on file     Social History Narrative    The patient lives with a friend in a two story home with bedrooms and bathrooms on both levels. His social support includes his friend. He has a walker and a cane at home. It is not indicated that he was receiving community services prior to admission. He wears eye glasses and upper dentures. He does not have history of falls prior to admission. He was independent with mobility and self care prior to admission. His goal is to feel better.        SCREENINGS    [place] Coma Scale  Eye Opening: Spontaneous  Best Verbal Response: Oriented  Best Motor Response: Obeys commands  [place] Coma Scale Score: 15        PHYSICAL EXAM    (up to 7 for level 4, 8 or more for level 5)     ED Triage Vitals   BP Temp Temp Source Pulse Resp SpO2 Height Weight   10/20/17 1138 10/20/17 1138 10/20/17 1138 10/20/17 1138 10/20/17 1138 10/20/17 1138 10/20/17 1140 10/20/17 1140   135/82 97.9 °F (36.6 °C) Oral 84 16 100 % 5' 10\" (1.778 m) 150 lb (68 kg)       Physical Exam   Constitutional: He is oriented to person, place, and time. He appears well-developed and well-nourished. HENT:   Head: Normocephalic. Eyes: Pupils are equal, round, and reactive to light. Neck: Normal range of motion. Neck supple. No JVD present. No tracheal deviation present. Cardiovascular: Normal rate. Pulmonary/Chest: Effort normal. No respiratory distress. He has no wheezes. He has no rales. He exhibits no tenderness. Abdominal: Soft. Bowel sounds are normal. He exhibits no distension and no mass. There is no tenderness. There is no guarding. Musculoskeletal: He exhibits no edema or deformity. Neurological: He is oriented to person, place, and time. Coordination normal.   Skin: Skin is warm and dry. Psychiatric:   Patient is alert and tearful , he hearing voices but does not know what they're saying. He states he is unable to sleep in the evenings, and is therefore fatigued at time. States increasing thoughts of suicide by overdosing on heroin and crack cocaine.   Patient is cooperative and makes good eye contact displays no specific distress other than emotional, and tearful       DIAGNOSTIC RESULTS     EKG: All EKG's are interpreted by the Emergency Department Physician who either signs or Co-signs this chart in the absence of a cardiologist.        RADIOLOGY:   Non-plain film images such as CT, Ultrasound and MRI are read by the radiologist. Plain radiographic images are visualized and preliminarily interpreted by the

## 2017-10-20 NOTE — ED TRIAGE NOTES
Pt reports that \"things were going good, ought a new house and wa trying to work . Anxiety increases with work and I gotta stop working  due to the anxiety. States voices have worsened, to\" tell me to hurt myself: Pt reports he attempted to OD on heroin 2-3 days ago, states is no longer abusing heroin until that attempts a few days ago for several months. States he is only needing to use his suboxone on infrequent basis. states out of all meds with the exception of the suboxone and seroquel due to loosing them in a move.

## 2017-10-21 LAB
EKG ATRIAL RATE: 66 BPM
EKG P AXIS: 69 DEGREES
EKG P-R INTERVAL: 136 MS
EKG Q-T INTERVAL: 466 MS
EKG QRS DURATION: 142 MS
EKG QTC CALCULATION (BAZETT): 488 MS
EKG R AXIS: 88 DEGREES
EKG T AXIS: 33 DEGREES
EKG VENTRICULAR RATE: 66 BPM

## 2017-10-21 PROCEDURE — 93005 ELECTROCARDIOGRAM TRACING: CPT

## 2017-10-21 PROCEDURE — 1240000000 HC EMOTIONAL WELLNESS R&B

## 2017-10-21 PROCEDURE — 6360000002 HC RX W HCPCS: Performed by: PSYCHIATRY & NEUROLOGY

## 2017-10-21 PROCEDURE — 6370000000 HC RX 637 (ALT 250 FOR IP): Performed by: PSYCHIATRY & NEUROLOGY

## 2017-10-21 RX ORDER — TOPIRAMATE 25 MG/1
25 TABLET ORAL 2 TIMES DAILY
Status: DISCONTINUED | OUTPATIENT
Start: 2017-10-21 | End: 2017-10-25 | Stop reason: HOSPADM

## 2017-10-21 RX ORDER — HALOPERIDOL 5 MG
5 TABLET ORAL EVERY 6 HOURS PRN
Status: DISCONTINUED | OUTPATIENT
Start: 2017-10-21 | End: 2017-10-21

## 2017-10-21 RX ORDER — HYDROXYZINE PAMOATE 50 MG/1
50 CAPSULE ORAL EVERY 6 HOURS PRN
Status: DISCONTINUED | OUTPATIENT
Start: 2017-10-21 | End: 2017-10-21

## 2017-10-21 RX ORDER — HYDROXYZINE HYDROCHLORIDE 50 MG/ML
50 INJECTION, SOLUTION INTRAMUSCULAR EVERY 6 HOURS PRN
Status: DISCONTINUED | OUTPATIENT
Start: 2017-10-21 | End: 2017-10-21

## 2017-10-21 RX ORDER — LANOLIN ALCOHOL/MO/W.PET/CERES
6 CREAM (GRAM) TOPICAL NIGHTLY PRN
Status: DISCONTINUED | OUTPATIENT
Start: 2017-10-21 | End: 2017-10-25 | Stop reason: HOSPADM

## 2017-10-21 RX ORDER — ACETAMINOPHEN 325 MG/1
650 TABLET ORAL EVERY 4 HOURS PRN
Status: DISCONTINUED | OUTPATIENT
Start: 2017-10-21 | End: 2017-10-25 | Stop reason: HOSPADM

## 2017-10-21 RX ORDER — HALOPERIDOL 5 MG/ML
5 INJECTION INTRAMUSCULAR EVERY 6 HOURS PRN
Status: DISCONTINUED | OUTPATIENT
Start: 2017-10-21 | End: 2017-10-21

## 2017-10-21 RX ORDER — BUPRENORPHINE HYDROCHLORIDE AND NALOXONE HYDROCHLORIDE DIHYDRATE 2; .5 MG/1; MG/1
1 TABLET SUBLINGUAL 3 TIMES DAILY
Status: DISCONTINUED | OUTPATIENT
Start: 2017-10-21 | End: 2017-10-25

## 2017-10-21 RX ADMIN — MELATONIN TAB 3 MG 6 MG: 3 TAB at 21:34

## 2017-10-21 RX ADMIN — BUPRENORPHINE AND NALOXONE 1 TABLET: 2; .5 TABLET SUBLINGUAL at 21:31

## 2017-10-21 RX ADMIN — TOPIRAMATE 25 MG: 25 TABLET ORAL at 21:31

## 2017-10-21 RX ADMIN — TOPIRAMATE 25 MG: 25 TABLET ORAL at 13:58

## 2017-10-21 RX ADMIN — BUPRENORPHINE AND NALOXONE 1 TABLET: 2; .5 TABLET SUBLINGUAL at 13:58

## 2017-10-21 ASSESSMENT — SLEEP AND FATIGUE QUESTIONNAIRES
DIFFICULTY ARISING: NO
DO YOU HAVE DIFFICULTY SLEEPING: YES
AVERAGE NUMBER OF SLEEP HOURS: 2
DO YOU USE A SLEEP AID: COMMENT
DIFFICULTY FALLING ASLEEP: YES
SLEEP PATTERN: DIFFICULTY FALLING ASLEEP;DISTURBED/INTERRUPTED SLEEP;RESTLESSNESS
RESTFUL SLEEP: NO
DIFFICULTY STAYING ASLEEP: YES

## 2017-10-21 ASSESSMENT — PAIN SCALES - GENERAL
PAINLEVEL_OUTOF10: 0
PAINLEVEL_OUTOF10: 1

## 2017-10-21 ASSESSMENT — PATIENT HEALTH QUESTIONNAIRE - PHQ9: SUM OF ALL RESPONSES TO PHQ QUESTIONS 1-9: 15

## 2017-10-21 NOTE — ED NOTES
Placed call to Dr. Jealni Lomax for disposition, he will call back when he is available.       Carlos Jensen RN  10/21/17 6437

## 2017-10-21 NOTE — ED NOTES
Consulted with Dr. Varsha Vences to review case, advised that the patient may be admitted to [facility] with a diagnosis of substance induced mood DO. PRNs- haldol 5mg PO/IM q6h PRN and vistaril 50mg PO/IM q6h PRN. Also advised to NOT order home medications as patient has been selectively compliant, to consult hospitalist for [facility] for H&P as well as medical medication management. Voluntary admission consent reviewed verbally and in writing. The patient verbalizes understanding and signed as such. Patient denies questions/concerns r/t voluntary admission status.         Otf Garcia RN  10/21/17 4277

## 2017-10-21 NOTE — ED PROVIDER NOTES
Previous PA did not share note. I never saw or assessed patient. 10-20-17 2039: medically cleared.       Paulie [place]  10/20/17 2039

## 2017-10-21 NOTE — ED NOTES
Per report from Sylvia Menchaca RN- patient has been declined at all facilities for dual diagnosis d/t insurance. Will consult with on call psychiatrist.  Patient is resting in assigned bed with even and unlabored respirations, no distress noted at this time.       Stacei Vaughn RN  10/21/17 1941

## 2017-10-21 NOTE — ED NOTES
Confirmed with Camron Cabral that all facilities have declined the patient for admission.       Ankita Maldonado RN  10/21/17 0154

## 2017-10-21 NOTE — H&P
PSYCHIATRIC EVALUATION      CHIEF COMPLAINT:  Depressed mood, suicidal ideations    HISTORY OF PRESENT ILLNESS: April Mendieta  is a 61 y.o. male with history of treatment for Bipolar Disorder, and Polysubstance dependence who was admitted from the ER due to depressed mood, and suicidal ideations. Current stressors include going back to work, which increased his anxiety. He reported he started using again drugs, and had overdosed on heroin. When interviewed today, the patient said that \"everything was going well, but he could not sleep for 2-3 days, after he started working 2-3 weeks ago. He said he had increased anxiety after starting working, and that he \"took dope\", heroine, cocaine, and Xanax. He said he has been feeling increased anxiety, poor appetite, poor sleep, and had been feeling depressed, \"wanted to go to sleep and not wake up\". He said he was hearing his  mother talk to him, and was \"seeing shadows\". He denied having any homicidal ideations, but was more irritable, and got into fights. He did acknowledge feeling paranoid, with non-specific paranoia. He denied having any menon. He said he was on Suboxone, and had also been prescribed Seroquel, and Clorazepate (confirmed with pharmacy; the OARRS report did not confirm or find the prescriptions). PAST PSYCHIATRIC HISTORY: the patient reported a history of being diagnosed with Bipolar disorder, and \"depression\" He had previous psychiatric admissions, including at [address] [facility].     PAST MEDICAL HISTORY:       Diagnosis Date    Abnormality of gait and mobility     Alcohol abuse 2014    Anxiety     Ataxia     CAD (coronary artery disease)     CHF (congestive heart failure) (HCC)     Chronic back pain     Cocaine abuse 2014    Depression     Heroin abuse 2014    History of hepatitis C     Hyperlipidemia     Hypertension     Late effects of CVA (cerebrovascular accident)     Leukocytosis     Myocardial infarct, old 200    S/P CABG x 3     Tobacco abuse 2014    Transient ischemic attack          ALLERGIES: Review of patient's allergies indicates no known allergies. FAMILY PSYCHIATRIC HISTORY: the patient said his father may have had a mental health history    SOCIAL HISTORY: he was born and raised in [place]. He is a high school graduate, and has been to college 2 years, studying \"business and computers\". 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 He was  twice, and is currently . He has one daughter, and 4 stepdaughters. He lives alone. SUBSTANCE ABUSE HISTORY: he smokes cigarettes, half a pack/day. He drinks alcohol, \"one can of RENEE IM GESÄUSE every couple of days\". He has been using crack cocaine, and Xanax, but 'not every day'. He is on Suboxone for opioid dependence; was prescribed 1/2 tablet tid, but says he is only taking 1/4 tablet tid, because he wants to \"get off it\". VITALS: /84   Pulse 75   Temp 98 °F (36.7 °C) (Oral)   Resp 16   Ht 5' 10\" (1.778 m)   Wt 150 lb (68 kg)   SpO2 97%   BMI 21.52 kg/m²     MENTAL STATUS EXAM: Appearance: alert, oriented, thin man, looking his stated age, fair grooming Behavior: cooperative Mood: depressed, anxious Affect: calm, composed Speech: with normal rate, rhythm Thought Process: organized Thought Content: with passive  suicidal ideations; denies homicidal ideations; denies paranoia or delusions.  Perception: denies  Orientation: oriented to time, place, self Concentration: fair Memory: fair Abstraction: able to demonstrate abstract thinking Fund of knowledge: fair Insight: limited Judgement: fair    LABS:   Admission on 10/20/2017   Component Date Value Ref Range Status    WBC 10/20/2017 6.5  4.8 - 10.8 K/uL Final    RBC 10/20/2017 4.50* 4.70 - 6.10 M/uL Final    Hemoglobin 10/20/2017 13.9* 14.0 - 18.0 g/dL Final    Hematocrit 10/20/2017 41.4* 42.0 - 52.0 % Final    MCV 10/20/2017 92.0  80.0 - 100.0 fL Final    MCH 10/20/2017 31.0 Ethanol percent 10/20/2017 Not indicated  G/dL Final    TSH 10/20/2017 2.140  0.270 - 4.200 uIU/mL Final    Color, UA 10/20/2017 KAROLYN* Straw/Yellow Final    Clarity, UA 10/20/2017 Clear  Clear Final    Glucose, Ur 10/20/2017 Negative  Negative mg/dL Final    Bilirubin Urine 10/20/2017 SMALL* Negative Final    Ketones, Urine 10/20/2017 TRACE* Negative mg/dL Final    Specific Gravity, UA 10/20/2017 1.024  1.005 - 1.030 Final    Blood, Urine 10/20/2017 Negative  Negative Final    pH, UA 10/20/2017 6.5  5.0 - 9.0 Final    Protein, UA 10/20/2017 30* Negative mg/dL Final    Urobilinogen, Urine 10/20/2017 2.0* <2.0 E.U./dL Final    Nitrite, Urine 10/20/2017 Negative  Negative Final    Leukocyte Esterase, Urine 10/20/2017 TRACE* Negative Final    Amphetamine Screen, Urine 10/20/2017 Neg  Negative <1000 ng/mL Final    Barbiturate Screen, Ur 10/20/2017 Neg  Negative < 200 ng/mL Final    Benzodiazepine Screen, Urine 10/20/2017 POSITIVE* Negative < 200 ng/mL Final    Cannabinoid Scrn, Ur 10/20/2017 POSITIVE* Negative < 50 ng/mL Final    COCAINE METABOLITE SCREEN URINE 10/20/2017 POSITIVE* Negative < 300 ng/mL Final    Opiate Scrn, Ur 10/20/2017 Neg  Negative < 300 ng/mL Final    PCP Scrn, Ur 10/20/2017 Neg  Negative < 25 ng/mL Final    Drug Screen Comment: 10/20/2017 see below   Final    Comment: This method is a screening test to detect only these drug  classes as part of a medical workup. Confirmatory testing  by another method should be ordered if clinically indicated.       Mucus, UA 10/20/2017 Present   Final    WBC, UA 10/20/2017 3-5  0 - 5 /HPF Final    RBC, UA 10/20/2017 0-2  0 - 2 /HPF Final    Epi Cells 10/20/2017 3-5  /HPF Final    Bacteria, UA 10/20/2017 Moderate  /HPF Final    CK-MB 10/20/2017 8.6* 0.0 - 6.7 ng/mL Final    CK-MB Index 10/20/2017 1.4  0.0 - 3.5 % Final    Ventricular Rate 10/21/2017 66  BPM Preliminary    Atrial Rate 10/21/2017 66  BPM Preliminary    P-R Interval 10/21/2017 136  ms Preliminary    QRS Duration 10/21/2017 142  ms Preliminary    Q-T Interval 10/21/2017 466  ms Preliminary    QTc Calculation (Bazett) 10/21/2017 488  ms Preliminary    P Axis 10/21/2017 69  degrees Preliminary    R Axis 10/21/2017 88  degrees Preliminary    T Axis 10/21/2017 33  degrees Preliminary           MEDICATIONS: Current Facility-Administered Medications: acetaminophen (TYLENOL) tablet 650 mg, 650 mg, Oral, Q4H PRN  magnesium hydroxide (MILK OF MAGNESIA) 400 MG/5ML suspension 30 mL, 30 mL, Oral, Daily PRN  haloperidol (HALDOL) tablet 5 mg, 5 mg, Oral, Q6H PRN **OR** haloperidol lactate (HALDOL) injection 5 mg, 5 mg, Intramuscular, Q6H PRN  hydrOXYzine (VISTARIL) capsule 50 mg, 50 mg, Oral, Q6H PRN **OR** hydrOXYzine (VISTARIL) injection 50 mg, 50 mg, Intramuscular, Q6H PRN  buprenorphine-naloxone (SUBOXONE) 2-0.5 MG SL tablet 1 tablet, 1 tablet, Sublingual, TID  topiramate (TOPAMAX) tablet 25 mg, 25 mg, Oral, BID     ASSESSMENT:     Bipolar disorder, depressed (per Hx) vs. Substance Induced Mood Disorder. Opioid use disorder  Cocaine use disorder  Sedative use disorder  PLAN: Patient admitted to 3 general program for further evaluation, treatment and safety. Daily vitals, regular diet provided. Patient will be started on Suboxone, and Topamax for his chemical dependency issues. Will refrain at this time to start Seroquel, since his QTc is high, and will also refrain from starting trazodone. He was prescribed Melatonin. Will consider starting Abilify for mood stabilization, and/or possibly depakote, or trileptal. PRN medications for agitation, anxiety and sleep are in place. Patient will be encouraged to participate in individual, group and milieu therapy. Patient will be discharged when clinically stable. Please note that case has been discussed with treatment team and notes have been reviewed.        Signed:  Mary Jo Quarles  10/21/2017  4:47 PM

## 2017-10-22 PROBLEM — E87.6 HYPOKALEMIA: Status: ACTIVE | Noted: 2017-10-22

## 2017-10-22 LAB
ANION GAP SERPL CALCULATED.3IONS-SCNC: 14 MEQ/L (ref 7–13)
BUN BLDV-MCNC: 15 MG/DL (ref 6–20)
CALCIUM SERPL-MCNC: 9.2 MG/DL (ref 8.6–10.2)
CHLORIDE BLD-SCNC: 103 MEQ/L (ref 98–107)
CO2: 24 MEQ/L (ref 22–29)
CREAT SERPL-MCNC: 1.59 MG/DL (ref 0.7–1.2)
GFR AFRICAN AMERICAN: 54.3
GFR NON-AFRICAN AMERICAN: 44.9
GLUCOSE BLD-MCNC: 99 MG/DL (ref 74–109)
POTASSIUM SERPL-SCNC: 4.2 MEQ/L (ref 3.5–5.1)
SODIUM BLD-SCNC: 141 MEQ/L (ref 132–144)

## 2017-10-22 PROCEDURE — 6360000002 HC RX W HCPCS: Performed by: PSYCHIATRY & NEUROLOGY

## 2017-10-22 PROCEDURE — 1240000000 HC EMOTIONAL WELLNESS R&B

## 2017-10-22 PROCEDURE — 80048 BASIC METABOLIC PNL TOTAL CA: CPT

## 2017-10-22 PROCEDURE — 6370000000 HC RX 637 (ALT 250 FOR IP): Performed by: PHYSICIAN ASSISTANT

## 2017-10-22 PROCEDURE — 6370000000 HC RX 637 (ALT 250 FOR IP): Performed by: PSYCHIATRY & NEUROLOGY

## 2017-10-22 PROCEDURE — 36415 COLL VENOUS BLD VENIPUNCTURE: CPT

## 2017-10-22 RX ORDER — ALBUTEROL SULFATE 90 UG/1
2 AEROSOL, METERED RESPIRATORY (INHALATION) EVERY 6 HOURS PRN
Status: DISCONTINUED | OUTPATIENT
Start: 2017-10-22 | End: 2017-10-25 | Stop reason: HOSPADM

## 2017-10-22 RX ORDER — LISINOPRIL 2.5 MG/1
2.5 TABLET ORAL DAILY
Status: DISCONTINUED | OUTPATIENT
Start: 2017-10-22 | End: 2017-10-25 | Stop reason: HOSPADM

## 2017-10-22 RX ORDER — CLOPIDOGREL BISULFATE 75 MG/1
75 TABLET ORAL DAILY
Status: DISCONTINUED | OUTPATIENT
Start: 2017-10-22 | End: 2017-10-25 | Stop reason: HOSPADM

## 2017-10-22 RX ORDER — GABAPENTIN 100 MG/1
100 CAPSULE ORAL 3 TIMES DAILY
Status: DISCONTINUED | OUTPATIENT
Start: 2017-10-22 | End: 2017-10-23

## 2017-10-22 RX ORDER — ASPIRIN 81 MG/1
81 TABLET, CHEWABLE ORAL DAILY
Status: DISCONTINUED | OUTPATIENT
Start: 2017-10-22 | End: 2017-10-25 | Stop reason: HOSPADM

## 2017-10-22 RX ORDER — ARIPIPRAZOLE 10 MG/1
5 TABLET ORAL DAILY
Status: DISCONTINUED | OUTPATIENT
Start: 2017-10-22 | End: 2017-10-25 | Stop reason: HOSPADM

## 2017-10-22 RX ORDER — ATORVASTATIN CALCIUM 80 MG/1
80 TABLET, FILM COATED ORAL NIGHTLY
Status: DISCONTINUED | OUTPATIENT
Start: 2017-10-22 | End: 2017-10-25 | Stop reason: HOSPADM

## 2017-10-22 RX ADMIN — BUPRENORPHINE AND NALOXONE 1 TABLET: 2; .5 TABLET SUBLINGUAL at 08:19

## 2017-10-22 RX ADMIN — METOPROLOL TARTRATE 12.5 MG: 25 TABLET, FILM COATED ORAL at 21:06

## 2017-10-22 RX ADMIN — TOPIRAMATE 25 MG: 25 TABLET ORAL at 08:19

## 2017-10-22 RX ADMIN — BUPRENORPHINE AND NALOXONE 1 TABLET: 2; .5 TABLET SUBLINGUAL at 21:01

## 2017-10-22 RX ADMIN — ARIPIPRAZOLE 5 MG: 10 TABLET ORAL at 11:50

## 2017-10-22 RX ADMIN — ASPIRIN 81 MG 81 MG: 81 TABLET ORAL at 21:00

## 2017-10-22 RX ADMIN — LISINOPRIL 2.5 MG: 2.5 TABLET ORAL at 21:13

## 2017-10-22 RX ADMIN — CLOPIDOGREL BISULFATE 75 MG: 75 TABLET ORAL at 21:17

## 2017-10-22 RX ADMIN — GABAPENTIN 100 MG: 100 CAPSULE ORAL at 21:01

## 2017-10-22 RX ADMIN — ATORVASTATIN CALCIUM 80 MG: 80 TABLET, FILM COATED ORAL at 21:01

## 2017-10-22 RX ADMIN — TOPIRAMATE 25 MG: 25 TABLET ORAL at 21:12

## 2017-10-22 RX ADMIN — VITAMIN D, TAB 1000IU (100/BT) 2000 UNITS: 25 TAB at 21:01

## 2017-10-22 RX ADMIN — GABAPENTIN 100 MG: 100 CAPSULE ORAL at 13:39

## 2017-10-22 RX ADMIN — BUPRENORPHINE AND NALOXONE 1 TABLET: 2; .5 TABLET SUBLINGUAL at 13:39

## 2017-10-22 ASSESSMENT — ENCOUNTER SYMPTOMS
GASTROINTESTINAL NEGATIVE: 1
RESPIRATORY NEGATIVE: 1

## 2017-10-22 ASSESSMENT — PAIN SCALES - GENERAL
PAINLEVEL_OUTOF10: 0

## 2017-10-22 ASSESSMENT — LIFESTYLE VARIABLES: HISTORY_ALCOHOL_USE: YES
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
## 2017-10-22 NOTE — H&P
History & Physical  [facility] 3W I    Patient: Rekha Álvarez    YOB: 1960  MRN: [id number]    Admit Date:  10/20/2017    Primary Care Physician:  Meggan Mathur MD    Admitting Diagnosis: Substance induced mood disorder ([facility] [address] [F19.94]. Pt medically clear for inpatient psychiatric admission. Subjective:    Patient is having problems with depression and SI w/plan. Pt currently denies CP, palp, SOB, abd pain, NVDFC  Patient Seen, Chart, Labs, Radiology studies, and Consults reviewed. Past Medical History:   Diagnosis Date    Abnormality of gait and mobility     Alcohol abuse 2014    Anxiety     Ataxia     CAD (coronary artery disease)     CHF (congestive heart failure) (HCC)     Chronic back pain     Cocaine abuse 2014    Depression     Heroin abuse 2014    History of hepatitis C     Hyperlipidemia     Hypertension     Late effects of CVA (cerebrovascular accident)     Leukocytosis     Myocardial infarct, old 2010    S/P CABG x 3     Tobacco abuse 2014    Transient ischemic attack      Current Facility-Administered Medications   Medication Dose Route Frequency Provider Last Rate Last Dose    gabapentin (NEURONTIN) capsule 100 mg  100 mg Oral TID Licha Wilkinson MD        ARIPiprazole (ABILIFY) tablet 5 mg  5 mg Oral Daily Licha Wilkinson MD   5 mg at 10/22/17 1150    acetaminophen (TYLENOL) tablet 650 mg  650 mg Oral Q4H PRN Licha Wilkinson MD        magnesium hydroxide (MILK OF MAGNESIA) 400 MG/5ML suspension 30 mL  30 mL Oral Daily PRN Licha Wilkinson MD        buprenorphine-naloxone (SUBOXONE) 2-0.5 MG SL tablet 1 tablet  1 tablet Sublingual TID Licha Wilkinson MD   1 tablet at 10/22/17 3321    topiramate (TOPAMAX) tablet 25 mg  25 mg Oral BID Licha Wilkinson MD   25 mg at 10/22/17 1808    melatonin tablet 6 mg  6 mg Oral Nightly PRN Licha Wilkinson MD   6 mg at 10/21/17 9725     Allergies - Review of patient's allergies indicates no known allergies.   Past Surgical History:   Procedure Laterality Date    CORONARY ARTERY BYPASS GRAFT      EXCISION / BIOPSY SKIN LESION OF ARM N/A 4/8/2017    I&D DEBRIDEMENT NECROTIC WOUND ABSCESS LEFT MEDIAL ELBOW AND RIGHT FOREARM  performed by Wild Baez MD at [facility] ECHOCARDIOGRAM  5/15/2013     Family History   Problem Relation Age of Onset    Family history unknown: Yes     Social History:  reports that he has been smoking Cigarettes. He has a 22.50 pack-year smoking history. He does not have any smokeless tobacco history on file. He reports that he drinks alcohol. He reports that he uses drugs, including Opiates , Cocaine, and Other-see comments, about 1 time per week. Review of Systems   Constitutional: Negative. HENT: Negative. Respiratory: Negative. Cardiovascular: Negative. Gastrointestinal: Negative. Psychiatric/Behavioral: Positive for decreased concentration and sleep disturbance. All other systems reviewed and are negative. Objective:   /74   Pulse 86   Temp 97 °F (36.1 °C) (Oral)   Resp 18   Ht 5' 10\" (1.778 m)   Wt 150 lb (68 kg)   SpO2 96%   BMI 21.52 kg/m²   No intake or output data in the 24 hours ending 10/22/17 1335  Physical Exam   Constitutional: He is oriented to person, place, and time. He appears well-developed and well-nourished. HENT:   Head: Normocephalic and atraumatic. Eyes: EOM are normal. Pupils are equal, round, and reactive to light. Neck: Normal range of motion. Cardiovascular: Normal rate and regular rhythm. Pulmonary/Chest: Effort normal and breath sounds normal.   Abdominal: Soft. Bowel sounds are normal.   Musculoskeletal: Normal range of motion. He exhibits no edema. Neurological: He is alert and oriented to person, place, and time. Skin: Skin is warm. Psychiatric: He has a normal mood and affect. His behavior is normal. Judgment and thought content normal.   Nursing note and vitals reviewed.       Data:  CBC:   Recent Labs 10/20/17   1200   WBC  6.5   RBC  4.50*   HGB  13.9*   HCT  41.4*   MCV  92.0   RDW  14.2   PLT  199     CMP: No results for input(s): CMP in the last 72 hours. TSH:   Recent Labs      10/20/17   1200   TSH  2.140       Assessment/Plan:  )Active Problems:    CVA (cerebral vascular accident) ([facility] [address]    Bipolar affective disorder ([facility] [address]    Alcohol abuse    CAD (coronary artery disease)    Cocaine abuse    Hypertension    Tobacco abuse    Transient ischemic attack    Myocardial infarct, old    Hypokalemia    Resume home meds per [facility]  Cont tx per psych attending  Repeat K+, replete if necessary      DVT Prophylaxis:Encourage ambulation, low risk for DVT, no chemical or mechanical prophylaxis necessary    This is an H&P exam only.   Pt has been advised to follow up with PCP for abnormal labs and/or medical concerns    Electronically signed by Ladonna Batista PA-C on 10/22/2017 at 1:35 PM  Dr. Spenser Powell, [facility], [address] [place] [address] Physician

## 2017-10-22 NOTE — CONSULTS
Please see H&P for this date    Electronically signed by Julienne Hernandez PA-C on 10/22/2017 at 1:35 PM

## 2017-10-22 NOTE — PROGRESS NOTES
Patient up on unit, denies any suicidal or homicidal ideations at this time. Patient rates depression 7/10 depressed about not being able to sleep. Patient anxious about not sleeping rates 8/10. Patient does hear his mothers voice calling his name.

## 2017-10-22 NOTE — PROGRESS NOTES
Group Therapy Note    Date: 10/22/17  Start Time: 6117  End Time:  6498    Number of Participants: 9    Type of Group: Psychoeducation    Patient's Goal: To participate in morning meeting. Notes: Patient declined to attend psychoeducation group [id number] despite encouragement by staff.      Discipline Responsible: /Counselor    ALYCIA Rivera

## 2017-10-22 NOTE — PROGRESS NOTES
BEHAVIORAL HEALTH FOLLOW-UP NOTE I    10/22/2017    [x] Patient was seen and examined in person  [x] Chart reviewed  [x] Labs reviewed  [x] Patient's case discussed with staff/team    Chief Complaint:   Depression, suicidal ideations    Interim History:     The patient said he is still depressed. He said his sleep was \"difficult\"; he still \"tossed and turned\" last night. He said his appetite was \"OK\". He said the suicidal ideations were still present, but not as much. He said that \"his anxiety is the biggest problem\". He said he does take Neurontin at home. He c/o auditory hallucinations of his mother calling him. He was explained the higher cardiac risk with Seroquel and/or Trazodone and Vistaril due to his prolonged QTc, and was offered a trial of Abilify, and Neurontin, which he agreed to. Appetite:  [] Normal/Unchanged  [] Increased  [x] Decreased  SI [x] Present  [] Absent    Sleep:       [] Normal/Unchanged  [] Fair       [x] Poor          HI  []Present  [x] Absent    Energy:    [] Normal/Unchanged  [] Increased  [x] Decreased   Plan [] Present                          [x] Absent  [] N/A    Patient is [] able  [x] unable to CONTRACT FOR SAFETY   Aggression:  [] yes  [x] no  Medication side effects(SE):  [x] None(Psych.  Meds.) [] Other  Well tolerated: [x] yes  [] no    Examination:  /74   Pulse 86   Temp 97 °F (36.1 °C) (Oral)   Resp 18   Ht 5' 10\" (1.778 m)   Wt 150 lb (68 kg)   SpO2 96%   BMI 21.52 kg/m²     Appearance: [x] casual  [] anxious  [] confused  [] blunted  [] silly  [] poor hygiene  [] poor grooming  Gait:  [x] stable  [] unstable  [] limping  [] shuffling  [] in wheel chair or other support    Mental Status:   Orientation: Date/Time: [x] yes  [] no Place: [x] yes  [] no     Person: [x] yes  [] no  Level of Consciousness: [x] alert  [] drowsy  [] tired  [] lethargic  [] distractable  [] asleep  [] could not be assessed    Manner:   [x] Cooperative  [] Guarded  [] Suspicious  [] Irritable  [] Hostile  [] Withdrawn  [] Other    Motor Activity:   [x] Normal  [] Agitation  [] Motor retardation  [] Tremor  [] Other    Musculoskeletal:   [x] Normal  [] Rigidity  [] Cogwheel  [] Flaccid  [] Tics/TD  [] Other    Speech:   [] Normal  [x] Soft  [x] Slow  [] Dysarthria  [] Incoherent  [] Other    Language:  [x] Normal  [] Expressive Aphasia  [] Fluent Aphasia  [] Other    Mood:  [] Euthymic  [x] Depressed  [] Irritable  [] Angry  [x] Anxious  [] Fearful  [] Apathetic  [] Euphoric  [] Other    Affect:  [] Euthymic  [x] Depressed  [] Blunted  [] Flat  [] Irritable  [] Angry  [x] Anxious    [] Labile  [] Expansive  [] Exaggerated  [] Other    Thought Process/Association:   [] Normal  [] Tangential  [] Circumstantial  [] Poverty of Thought  [x] [place]  [] Disorganized  [] Racing Thoughts  [] Flight of Ideas  [] Loose  [] Other    Thought Contents:   [x] Hopelessness  [x] Worthlessness  [] Hypochondriasis  [] Delusions  [] Paranoia  [x] Ruminations  [] Obsessions/Compulsions  [] Confused [] Hopeful   [] Future Oriented [] Other    Perception:  [] Normal  [x] Hallucinations  [x] Auditory  [] Visual  [] Olfactory  [] Tactile    [] Dissociation  [] Flashbacks  [] Other    Attention/Concentration:  [] Intact  [] Poor  [x] Distractible  [] Other    Cognition:  [x] Intact  [] Impaired   Insight: [] Intact  [x] Fair  [] Limited   Short Term Memory:  [x] Intact  [] Impaired  [] Poor  Judgement:  [] Intact  [x] Fair  [] Limited  Remote Memory:  [x] Intact  [] Impaired  [] Poor        BEHAVIORAL HEALTH FOLLOW-UP NOTE II    10/22/2017    PAST MEDICAL/PSYCHIATRIC HISTORY:   Past Medical History:   Diagnosis Date    Abnormality of gait and mobility     Alcohol abuse 2014    Anxiety     Ataxia     CAD (coronary artery disease)     CHF (congestive heart failure) (HCC)     Chronic back pain     Cocaine abuse 2014    Depression     Heroin abuse 2014    History of hepatitis C     Hyperlipidemia     Hypertension     Late effects of CVA (cerebrovascular accident)     Leukocytosis     Myocardial infarct, old 2010    S/P CABG x 3     Tobacco abuse 2014    Transient ischemic attack        FAMILY/SOCIAL HISTORY:  Family History   Problem Relation Age of Onset    Family history unknown: Yes     Social History     Social History    Marital status:      Spouse name: N/A    Number of children: N/A    Years of education: N/A     Occupational History    Not on file. Social History Main Topics    Smoking status: Current Every Day Smoker     Packs/day: 0.50     Years: 45.00     Types: Cigarettes    Smokeless tobacco: Not on file    Alcohol use Yes      Comment: social    Drug use:      Frequency: 1.0 time per week     Types: Opiates , Cocaine, Other-see comments      Comment: heroin, crack    Sexual activity: Yes     Partners: Female     Other Topics Concern    Not on file     Social History Narrative    The patient lives with a friend in a two story home with bedrooms and bathrooms on both levels. His social support includes his friend. He has a walker and a cane at home. It is not indicated that he was receiving community services prior to admission. He wears eye glasses and upper dentures. He does not have history of falls prior to admission. He was independent with mobility and self care prior to admission. His goal is to feel better. LABS:  Lab Results   Component Value Date    LITHIUM <0.1 (LL) 04/14/2014     10/22/2017    BUN 15 10/22/2017    CREATININE 1.59 (H) 10/22/2017    TSH 2.140 10/20/2017    WBC 6.5 10/20/2017     Lab Results   Component Value Date    VALPROATE 4 (L) 09/14/2012     Lab Results   Component Value Date    INR 1.1 06/06/2017    PROTIME 11.9 06/06/2017     Lab Results   Component Value Date    APTT 28.7 06/06/2017     Recent Labs      10/20/17   1200   ETOH  <10         ROS:  [x] All negative/unchanged except if checked.  Explain positive(checked items) below:  [] Constitutional  [] Eyes  [] Ear/Nose/Mouth/Throat  [] Respiratory  [] CV  [] GI  []   [] Musculoskeletal  [] Skin/Breast  [] Neurological  [] Endocrine  [] Heme/Lymph  [] Allergic/Immunologic    Explanation:     MEDICATIONS:    Current Facility-Administered Medications:     gabapentin (NEURONTIN) capsule 100 mg, 100 mg, Oral, TID, Nathan He MD, 100 mg at 10/22/17 1339    ARIPiprazole (ABILIFY) tablet 5 mg, 5 mg, Oral, Daily, Nathan He MD, 5 mg at 10/22/17 1150    acetaminophen (TYLENOL) tablet 650 mg, 650 mg, Oral, Q4H PRN, Nathan He MD    magnesium hydroxide (MILK OF MAGNESIA) 400 MG/5ML suspension 30 mL, 30 mL, Oral, Daily PRN, Nathan He MD    buprenorphine-naloxone (SUBOXONE) 2-0.5 MG SL tablet 1 tablet, 1 tablet, Sublingual, TID, Nathan He MD, 1 tablet at 10/22/17 1339    topiramate (TOPAMAX) tablet 25 mg, 25 mg, Oral, BID, Nathan He MD, 25 mg at 10/22/17 9802    melatonin tablet 6 mg, 6 mg, Oral, Nightly PRN, Nathan He MD, 6 mg at 10/21/17 5284    PSYCHOTHERAPY/COUNSELING:  [] Therapeutic interview  [] Supportive  [] CBT  [] Ongoing  [] Other    ASSESSMENT:  Patient is:  [x] Well controlled  [] Improving  [] Worsening  [] Other    [x] Patient continues to need, on a daily basis, active treatment furnished directly by or     requiring the supervision of inpatient psychiatric personnel     Diagnosis:  Active Problems:    CVA (cerebral vascular accident) ([facility] [address]    Bipolar affective disorder ([facility] [address]    Alcohol abuse    CAD (coronary artery disease)    Cocaine abuse    Hypertension    Tobacco abuse    Transient ischemic attack    Myocardial infarct, old    Hypokalemia      Treatment Plan:  [x] Continue Current Medications - will start Abilify and Neurontin  [x] Continue Follow-up  [x] Continue Labs      [x] Risks, benefits, side effects, drug-to-drug interactions and alternatives to treatment were discussed in my usual manner.     Reason for more than one antipsychotic:  [x] N/A  [] 3 failed monotherapy(drugs tried):  [] Cross over to a new antipsychotic  [] Taper to monotherapy from polypharmacy  [] Augmentation of Clozapine therapy due to treatment resistance to single therapy      Electronically signed by Benitez Samson MD on 10/22/2017 at 5:20 PM

## 2017-10-23 PROBLEM — F19.10 POLYDRUG ABUSE (HCC): Status: ACTIVE | Noted: 2017-10-23

## 2017-10-23 PROCEDURE — 6370000000 HC RX 637 (ALT 250 FOR IP): Performed by: PHYSICIAN ASSISTANT

## 2017-10-23 PROCEDURE — 99232 SBSQ HOSP IP/OBS MODERATE 35: CPT | Performed by: PSYCHIATRY & NEUROLOGY

## 2017-10-23 PROCEDURE — 94664 DEMO&/EVAL PT USE INHALER: CPT

## 2017-10-23 PROCEDURE — 6360000002 HC RX W HCPCS: Performed by: PSYCHIATRY & NEUROLOGY

## 2017-10-23 PROCEDURE — 6370000000 HC RX 637 (ALT 250 FOR IP): Performed by: PSYCHIATRY & NEUROLOGY

## 2017-10-23 PROCEDURE — 1240000000 HC EMOTIONAL WELLNESS R&B

## 2017-10-23 RX ORDER — GABAPENTIN 100 MG/1
200 CAPSULE ORAL 3 TIMES DAILY
Status: DISCONTINUED | OUTPATIENT
Start: 2017-10-23 | End: 2017-10-25 | Stop reason: HOSPADM

## 2017-10-23 RX ORDER — ACETAMINOPHEN 80 MG
TABLET,CHEWABLE ORAL ONCE
Status: COMPLETED | OUTPATIENT
Start: 2017-10-23 | End: 2017-10-23

## 2017-10-23 RX ADMIN — TOPIRAMATE 25 MG: 25 TABLET ORAL at 21:01

## 2017-10-23 RX ADMIN — ATORVASTATIN CALCIUM 80 MG: 80 TABLET, FILM COATED ORAL at 21:01

## 2017-10-23 RX ADMIN — ARIPIPRAZOLE 5 MG: 10 TABLET ORAL at 09:00

## 2017-10-23 RX ADMIN — Medication: at 21:04

## 2017-10-23 RX ADMIN — BUPRENORPHINE AND NALOXONE 1 TABLET: 2; .5 TABLET SUBLINGUAL at 14:09

## 2017-10-23 RX ADMIN — BUPRENORPHINE AND NALOXONE 1 TABLET: 2; .5 TABLET SUBLINGUAL at 09:01

## 2017-10-23 RX ADMIN — METOPROLOL TARTRATE 12.5 MG: 25 TABLET, FILM COATED ORAL at 09:00

## 2017-10-23 RX ADMIN — TOPIRAMATE 25 MG: 25 TABLET ORAL at 09:01

## 2017-10-23 RX ADMIN — LISINOPRIL 2.5 MG: 2.5 TABLET ORAL at 09:01

## 2017-10-23 RX ADMIN — VITAMIN D, TAB 1000IU (100/BT) 2000 UNITS: 25 TAB at 09:01

## 2017-10-23 RX ADMIN — GABAPENTIN 200 MG: 100 CAPSULE ORAL at 21:01

## 2017-10-23 RX ADMIN — ASPIRIN 81 MG 81 MG: 81 TABLET ORAL at 09:01

## 2017-10-23 RX ADMIN — GABAPENTIN 100 MG: 100 CAPSULE ORAL at 09:01

## 2017-10-23 RX ADMIN — CLOPIDOGREL BISULFATE 75 MG: 75 TABLET ORAL at 09:01

## 2017-10-23 RX ADMIN — BUPRENORPHINE AND NALOXONE 1 TABLET: 2; .5 TABLET SUBLINGUAL at 21:01

## 2017-10-23 RX ADMIN — METOPROLOL TARTRATE 12.5 MG: 25 TABLET, FILM COATED ORAL at 21:02

## 2017-10-23 ASSESSMENT — PAIN SCALES - GENERAL
PAINLEVEL_OUTOF10: 6
PAINLEVEL_OUTOF10: 0
PAINLEVEL_OUTOF10: 0

## 2017-10-23 NOTE — PLAN OF CARE
Problem: Suicide risk  Goal: Provide patient with safe environment  Provide patient with safe environment   Outcome: Met This Shift

## 2017-10-23 NOTE — PLAN OF CARE
Problem: Altered Mood, Depressive Behavior  Goal: LTG-Able to verbalize acceptance of life and situations over which he or she has no control  Outcome: Ongoing    Goal: LTG-Able to verbalize and/or display a decrease in depressive symptoms  Outcome: Ongoing    Goal: STG-Able to verbalize suicidal ideations  Outcome: Ongoing    Goal: STG-Able to verbalize support system  Outcome: Ongoing    Goal: LTG-Absence of self-harm  Outcome: Met This Shift    Goal: STG-Knowledge of positive coping patterns  Outcome: Ongoing    Goal: Patient Specific Goal  Outcome: Ongoing    Goal: Participation in care planning  Outcome: Ongoing      Problem: Suicide risk  Goal: Provide patient with safe environment  Provide patient with safe environment   Outcome: Ongoing

## 2017-10-23 NOTE — PROGRESS NOTES
BEHAVIORAL HEALTH FOLLOW-UP NOTE     10/23/2017     Patient was seen and examined in person, Chart reviewed   Patient's case discussed with staff/team    Chief Complaint: depression SI    Interim History:     Pt slept better last night  Neurontin helped his anxiety  Less hopeless and worthless  Less suicidal today    Appetite:  [x] Normal/Unchanged  [] Increased  [] Decreased      Sleep:       [] Normal/Unchanged  [x] Fair       [] Poor              Energy:    [] Normal/Unchanged  [] Increased  [x] Decreased        SI [x]passive  Present  [] Absent    HI  []Present  [x] Absent     Aggression:  [] yes  [x] no    Patient is [] able  [x] unable to CONTRACT FOR SAFETY     PAST MEDICAL/PSYCHIATRIC HISTORY:   Past Medical History:   Diagnosis Date    Abnormality of gait and mobility     Alcohol abuse 2014    Anxiety     Ataxia     CAD (coronary artery disease)     CHF (congestive heart failure) (HCC)     Chronic back pain     Cocaine abuse 2014    Depression     Heroin abuse 2014    History of hepatitis C     Hyperlipidemia     Hypertension     Late effects of CVA (cerebrovascular accident)     Leukocytosis     Myocardial infarct, old 2010    S/P CABG x 3     Tobacco abuse 2014    Transient ischemic attack        FAMILY/SOCIAL HISTORY:  Family History   Problem Relation Age of Onset    Family history unknown: Yes     Social History     Social History    Marital status:      Spouse name: N/A    Number of children: N/A    Years of education: N/A     Occupational History    Not on file.      Social History Main Topics    Smoking status: Current Every Day Smoker     Packs/day: 0.50     Years: 45.00     Types: Cigarettes    Smokeless tobacco: Not on file    Alcohol use Yes      Comment: social    Drug use:      Frequency: 1.0 time per week     Types: Opiates , Cocaine, Other-see comments      Comment: heroin, crack    Sexual activity: Yes     Partners: Female     Other Topics Concern    Not on file     Social History Narrative    The patient lives with a friend in a two story home with bedrooms and bathrooms on both levels. His social support includes his friend. He has a walker and a cane at home. It is not indicated that he was receiving community services prior to admission. He wears eye glasses and upper dentures. He does not have history of falls prior to admission. He was independent with mobility and self care prior to admission. His goal is to feel better. ROS:  [x] All negative/unchanged except if checked.  Explain positive(checked items) below:  [] Constitutional  [] Eyes  [] Ear/Nose/Mouth/Throat  [] Respiratory  [] CV  [] GI  []   [] Musculoskeletal  [] Skin/Breast  [] Neurological  [] Endocrine  [] Heme/Lymph  [] Allergic/Immunologic    Explanation:     MEDICATIONS:    Current Facility-Administered Medications:     gabapentin (NEURONTIN) capsule 100 mg, 100 mg, Oral, TID, Gloria Treviño MD, 100 mg at 10/23/17 0901    ARIPiprazole (ABILIFY) tablet 5 mg, 5 mg, Oral, Daily, Gloria Treviño MD, 5 mg at 10/23/17 0900    albuterol sulfate  (90 Base) MCG/ACT inhaler 2 puff, 2 puff, Inhalation, Q6H PRN, Tatyana Reece PA-C    aspirin chewable tablet 81 mg, 81 mg, Oral, Daily, Tatyana Reece PA-C, 81 mg at 10/23/17 0901    atorvastatin (LIPITOR) tablet 80 mg, 80 mg, Oral, Nightly, Tatyana Reece PA-C, 80 mg at 10/22/17 2101    vitamin D (CHOLECALCIFEROL) tablet 2,000 Units, 2,000 Units, Oral, Daily, Tatyana Reece PA-C, 2,000 Units at 10/23/17 0901    clopidogrel (PLAVIX) tablet 75 mg, 75 mg, Oral, Daily, Tatyana Reece PA-C, 75 mg at 10/23/17 0901    lisinopril (PRINIVIL;ZESTRIL) tablet 2.5 mg, 2.5 mg, Oral, Daily, Tatyana Reece PA-C, 2.5 mg at 10/23/17 0901    metoprolol tartrate (LOPRESSOR) tablet 12.5 mg, 12.5 mg, Oral, BID, Tatyana Reece PA-C, 12.5 mg at 10/23/17 0900    acetaminophen (TYLENOL) tablet 650 mg, 650 mg, Oral, Q4H PRN, Christina Frey Value Date    LITHIUM <0.1 (LL) 04/14/2014       Treatment Plan:  Reviewed current Medications with the patient. Increase neurontin to 200 mg po tid  Risks, benefits, side effects, drug-to-drug interactions and alternatives to treatment were discussed. Collateral information: pending  CD evaluation  Encourage patient to attend group and other milieu activities.   Discharge planning discussed with the patient and treatment team.    PSYCHOTHERAPY/COUNSELING:  [x] Therapeutic interview  [x] Supportive  [] CBT  [] Ongoing  [] Other    [x] Patient continues to need, on a daily basis, active treatment furnished directly by or requiring the supervision of inpatient psychiatric personnel      Anticipated Length of stay:            Electronically signed by Radha Payne MD on 10/23/2017 at 2:00 PM

## 2017-10-23 NOTE — PROGRESS NOTES
Pt is very pleased that his sleep has vastly improved since hes been here .  He has been showering and eating well and states his feelings of depression and anxiety are way down now that his sleep has been restored pt denies any hallucinations today

## 2017-10-23 NOTE — PROGRESS NOTES
Out on unit, social. Denies SI, depression or hallucinations. States feeling really good today. Calm, cooperative, eating and sleeping.

## 2017-10-24 PROCEDURE — 90833 PSYTX W PT W E/M 30 MIN: CPT | Performed by: PSYCHIATRY & NEUROLOGY

## 2017-10-24 PROCEDURE — 6370000000 HC RX 637 (ALT 250 FOR IP): Performed by: PSYCHIATRY & NEUROLOGY

## 2017-10-24 PROCEDURE — 99231 SBSQ HOSP IP/OBS SF/LOW 25: CPT | Performed by: PSYCHIATRY & NEUROLOGY

## 2017-10-24 PROCEDURE — 1240000000 HC EMOTIONAL WELLNESS R&B

## 2017-10-24 PROCEDURE — 6370000000 HC RX 637 (ALT 250 FOR IP): Performed by: PHYSICIAN ASSISTANT

## 2017-10-24 PROCEDURE — 6360000002 HC RX W HCPCS: Performed by: PSYCHIATRY & NEUROLOGY

## 2017-10-24 RX ADMIN — GABAPENTIN 200 MG: 100 CAPSULE ORAL at 21:14

## 2017-10-24 RX ADMIN — TOPIRAMATE 25 MG: 25 TABLET ORAL at 21:14

## 2017-10-24 RX ADMIN — ASPIRIN 81 MG 81 MG: 81 TABLET ORAL at 09:29

## 2017-10-24 RX ADMIN — LISINOPRIL 2.5 MG: 2.5 TABLET ORAL at 09:28

## 2017-10-24 RX ADMIN — CLOPIDOGREL BISULFATE 75 MG: 75 TABLET ORAL at 09:29

## 2017-10-24 RX ADMIN — GABAPENTIN 200 MG: 100 CAPSULE ORAL at 13:38

## 2017-10-24 RX ADMIN — BUPRENORPHINE AND NALOXONE 1 TABLET: 2; .5 TABLET SUBLINGUAL at 21:14

## 2017-10-24 RX ADMIN — ARIPIPRAZOLE 5 MG: 10 TABLET ORAL at 09:29

## 2017-10-24 RX ADMIN — MELATONIN TAB 3 MG 6 MG: 3 TAB at 21:14

## 2017-10-24 RX ADMIN — TOPIRAMATE 25 MG: 25 TABLET ORAL at 09:28

## 2017-10-24 RX ADMIN — METOPROLOL TARTRATE 12.5 MG: 25 TABLET, FILM COATED ORAL at 09:29

## 2017-10-24 RX ADMIN — GABAPENTIN 200 MG: 100 CAPSULE ORAL at 09:28

## 2017-10-24 RX ADMIN — METOPROLOL TARTRATE 12.5 MG: 25 TABLET, FILM COATED ORAL at 21:14

## 2017-10-24 RX ADMIN — BUPRENORPHINE AND NALOXONE 1 TABLET: 2; .5 TABLET SUBLINGUAL at 13:38

## 2017-10-24 RX ADMIN — VITAMIN D, TAB 1000IU (100/BT) 2000 UNITS: 25 TAB at 09:28

## 2017-10-24 RX ADMIN — ATORVASTATIN CALCIUM 80 MG: 80 TABLET, FILM COATED ORAL at 21:14

## 2017-10-24 RX ADMIN — BUPRENORPHINE AND NALOXONE 1 TABLET: 2; .5 TABLET SUBLINGUAL at 09:28

## 2017-10-24 ASSESSMENT — PAIN SCALES - GENERAL
PAINLEVEL_OUTOF10: 0
PAINLEVEL_OUTOF10: 6
PAINLEVEL_OUTOF10: 0
PAINLEVEL_OUTOF10: 4
PAINLEVEL_OUTOF10: 6

## 2017-10-24 NOTE — PROGRESS NOTES
OF MAGNESIA) 400 MG/5ML suspension 30 mL, 30 mL, Oral, Daily PRN, Overton Cabot, MD    buprenorphine-naloxone (SUBOXONE) 2-0.5 MG SL tablet 1 tablet, 1 tablet, Sublingual, TID, Overton Cabot, MD, 1 tablet at 10/24/17 1338    topiramate (TOPAMAX) tablet 25 mg, 25 mg, Oral, BID, Overton Cabot, MD, 25 mg at 10/24/17 3352    melatonin tablet 6 mg, 6 mg, Oral, Nightly PRN, Overton Cabot, MD, 6 mg at 10/21/17 1004      Examination:  /75   Pulse 71   Temp 97 °F (36.1 °C) (Oral)   Resp 20   Ht 5' 10\" (1.778 m)   Wt 150 lb (68 kg)   SpO2 99%   BMI 21.52 kg/m²   Gait - steady  Medication side effects(SE): no    Mental Status Examination:    Level of consciousness:  within normal limits   Appearance:  fair grooming and fair hygiene  Behavior/Motor:  no abnormalities noted  Attitude toward examiner:  cooperative  Speech:  normal rate   Mood: less depressed  Affect:  mood congruent  Thought processes:  goal directed   Thought content:  Suicidal Ideation:  passive  Cognition:  oriented to person, place, and time   Concentration distractible  Insight fair   Judgement fair     ASSESSMENT:   Patient symptoms are:  [] Well controlled  [x] Improving  [] Worsening  [] No change       Diagnosis:  Principal Problem:    B[facility] ([facility]  Active Problems:    Polydrug abuse      LABS:    No results for input(s): WBC, HGB, PLT in the last 72 hours. Recent Labs      10/22/17   1335   NA  141   K  4.2   CL  103   CO2  24   BUN  15   CREATININE  1.59*   GLUCOSE  99     No results for input(s): BILITOT, ALKPHOS, AST, ALT in the last 72 hours.   Lab Results   Component Value Date    LABAMPH Neg 10/20/2017    BARBSCNU Neg 10/20/2017    LABBENZ POSITIVE 10/20/2017    LABBENZ DTCD 09/14/2012    OPIATESCREENURINE Neg 10/20/2017    PHENCYCLIDINESCREENURINE Neg 10/20/2017    ETOH <10 10/20/2017     Lab Results   Component Value Date    TSH 2.140 10/20/2017     Lab Results   Component Value Date    LITHIUM <0.1 (LL) 04/14/2014         Treatment Plan:  Reviewed current Medications with the patient. Risks, benefits, side effects, drug-to-drug interactions and alternatives to treatment were discussed. Collateral information  CD evaluation  Encourage patient to attend group and other milieu activities.   Discharge planning discussed with the patient and treatment team.    PSYCHOTHERAPY/COUNSELING:  [x] Therapeutic interview  [x] Supportive  [] CBT  [] Ongoing  [] Other  Patient was seen 1:1 for 20 minutes, other than E&M time spent, focusing on      - coping skills techniques     - Anxiety management techniques discussed including deep breathing exercise and PMR     - discussing patients strength and weakness      [x] Patient continues to need, on a daily basis, active treatment furnished directly by or requiring the supervision of inpatient psychiatric personnel      Anticipated Length of stay:            Electronically signed by Sai Burnett MD on 10/24/2017 at 3:30 PM

## 2017-10-24 NOTE — PROGRESS NOTES
Group Therapy Note    Date: 10/24/2017  Start Time: 0733  End Time:  6107    Number of Participants: 5    Type of Group: Psychotherapy    Patient's Goal:  To promote a sense of group engagement. Notes:  Patient connect with a participant who made amends with a family member before they passed. Patient had the same experience with his father. Status After Intervention:  Improved    Participation Level: Active Listener    Participation Quality: Appropriate      Speech:  normal      Thought Process/Content: Logical      Affective Functioning: Congruent      Mood: elevated      Level of consciousness:  Alert      Response to Learning: Able to verbalize current knowledge/experience      Endings: None Reported    Modes of Intervention: Education      Discipline Responsible: /Counselor      Signature:   ALYCIA Avila

## 2017-10-24 NOTE — PROGRESS NOTES
Pt. refused to attend the 1100 skills group due to resting in room, despite staff encouragement. Electronically signed by Ely Dumont, [address] on 10/24/2017 at 1:31 PM

## 2017-10-24 NOTE — PROGRESS NOTES
Group Therapy Note    Date: 10/23/2017  Start Time: 1640  End Time:  1318  Number of Participants: 10    Type of Group: Healthy Living/Wellness    Wellness Binder Information  Module Name:    Session Number:      Patient's Goal:  Lower anxiety and use meditation along with medications appropraitely    Notes:  Group held by DIMPLE Leal LPN    Status After Intervention:  Unchanged    Participation Level: Interactive    Participation Quality: Appropriate      Speech:  normal      Thought Process/Content: Logical      Affective Functioning: Congruent      Mood:        Level of consciousness:  Alert      Response to Learning: Able to verbalize current knowledge/experience      Endings: None Reported    Modes of Intervention: Education, Support, Socialization and Activity      Discipline Responsible: Licensed Practical Nurse      Signature:  Hakan Gonzalez RN

## 2017-10-24 NOTE — PROGRESS NOTES
Pt. refused to attend the 0900 community meeting due to resting in room, despite staff encouragement. Electronically signed by Alcira Lemus, [address] on 10/24/2017 at 10:05 AM

## 2017-10-24 NOTE — PROGRESS NOTES
Pt states he no longer feels depressed or suicidal now that he is able to sleep again . He no longer feels paranoid or hears any voices . He has been visible on the unit attending groups and watching tv with his peers .  His appetite is good and he is neat in appearance and seems calm and comfortable

## 2017-10-24 NOTE — PROGRESS NOTES
Group Therapy Note    Date: 10/23/2017  Start Time: 2105  End Time:  2120  Number of Participants: 3    Type of Group: Relaxation and wrap up     [address] [employer]  Module Name:    Session Number:      Patient's Goal:      Notes:  Group held by Rosita Jerry LPN    Status After Intervention:  Improved    Participation Level: Interactive    Participation Quality: Appropriate      Speech:  normal      Thought Process/Content: Logical      Affective Functioning: Congruent      Mood:        Level of consciousness:  Alert      Response to Learning: Able to retain information      Endings: None Reported    Modes of Intervention: Socialization and Activity      Discipline Responsible: Licensed Practical Nurse      Signature:  Hakan Gonzalez RN

## 2017-10-24 NOTE — PROGRESS NOTES
Group Therapy Note    Date: 10/23/2017  Start Time: 1933  End Time:  1937  Number of Participants: 6    Type of Group: Recreational    Wellness Binder Information  Module Name:  Activity   Session Number:  7p    Patient's Goal:  To remain calm under pressure and engage in constructive activities when feeling stressed    Notes:  Pt reported that going for a walk helps while at home. And while on the unit they can walk the halls, watch tv, suduko, color and word search. Status After Intervention:  Improved    Participation Level:  Active Listener and Interactive    Participation Quality: Appropriate      Speech:  normal      Thought Process/Content: Logical      Affective Functioning: Congruent      Mood: depressed      Level of consciousness:  Alert and Oriented x4      Response to Learning: Able to verbalize current knowledge/experience, Able to verbalize/acknowledge new learning, Able to retain information, Capable of insight, Able to change behavior and Progressing to goal      Endings: None Reported    Modes of Intervention: Activity      Discipline Responsible: Licensed Practical Nurse      Signature:  Anny Lyon LPN

## 2017-10-24 NOTE — PLAN OF CARE
Problem: Altered Mood, Depressive Behavior  Intervention: Depressive symptoms assessment-behavioral health  Pt denies feeling depressed now that his sleep has been restored  Intervention: Nutritional intake assessment  pts appetite is very good   Intervention: Self-harm assessment-behavioral health  Pt is glad to be alive and does not want to hurt himself   Intervention: Assist patient with identification of stressors in patient's life that precipitated current crisis  ongoing  Intervention: Discharge safety plan  Pt has a place to live and will return there at discharge   Intervention: Environmental safety management-behavioral health  Ongoing   Intervention: Encourage patient to set daily goal  Done daily  Intervention: Medication intake supervison-behavioral health  Pt takes his meds cooperatively  Intervention: Values assessment  ongoing    Goal: LTG-Able to verbalize acceptance of life and situations over which he or she has no control  Outcome: Met This Shift    Goal: LTG-Able to verbalize and/or display a decrease in depressive symptoms  Outcome: Met This Shift    Goal: STG-Able to verbalize suicidal ideations  Outcome: Met This Shift    Goal: STG-Able to verbalize support system  Outcome: Met This Shift    Goal: LTG-Absence of self-harm  Outcome: Met This Shift    Goal: STG-Knowledge of positive coping patterns  Outcome: Met This Shift    Goal: Patient Specific Goal  Outcome: Met This Shift    Goal: Participation in care planning  Outcome: Met This Shift

## 2017-10-25 VITALS
SYSTOLIC BLOOD PRESSURE: 125 MMHG | HEART RATE: 68 BPM | HEIGHT: 70 IN | TEMPERATURE: 98 F | BODY MASS INDEX: 21.47 KG/M2 | DIASTOLIC BLOOD PRESSURE: 81 MMHG | WEIGHT: 150 LBS | OXYGEN SATURATION: 98 % | RESPIRATION RATE: 16 BRPM

## 2017-10-25 PROCEDURE — 6370000000 HC RX 637 (ALT 250 FOR IP): Performed by: PSYCHIATRY & NEUROLOGY

## 2017-10-25 PROCEDURE — 6360000002 HC RX W HCPCS: Performed by: PSYCHIATRY & NEUROLOGY

## 2017-10-25 PROCEDURE — 6370000000 HC RX 637 (ALT 250 FOR IP): Performed by: PHYSICIAN ASSISTANT

## 2017-10-25 PROCEDURE — 99239 HOSP IP/OBS DSCHRG MGMT >30: CPT | Performed by: PSYCHIATRY & NEUROLOGY

## 2017-10-25 RX ORDER — ARIPIPRAZOLE 5 MG/1
5 TABLET ORAL DAILY
Qty: 30 TABLET | Refills: 1 | Status: ON HOLD | OUTPATIENT
Start: 2017-10-26 | End: 2018-01-29 | Stop reason: HOSPADM

## 2017-10-25 RX ORDER — BUPRENORPHINE HYDROCHLORIDE AND NALOXONE HYDROCHLORIDE DIHYDRATE 2; .5 MG/1; MG/1
0.5 TABLET SUBLINGUAL 3 TIMES DAILY
Status: DISCONTINUED | OUTPATIENT
Start: 2017-10-25 | End: 2017-10-25 | Stop reason: HOSPADM

## 2017-10-25 RX ADMIN — GABAPENTIN 200 MG: 100 CAPSULE ORAL at 09:17

## 2017-10-25 RX ADMIN — METOPROLOL TARTRATE 12.5 MG: 25 TABLET, FILM COATED ORAL at 09:23

## 2017-10-25 RX ADMIN — BUPRENORPHINE AND NALOXONE 1 TABLET: 2; .5 TABLET SUBLINGUAL at 09:17

## 2017-10-25 RX ADMIN — VITAMIN D, TAB 1000IU (100/BT) 2000 UNITS: 25 TAB at 09:17

## 2017-10-25 RX ADMIN — CLOPIDOGREL BISULFATE 75 MG: 75 TABLET ORAL at 09:17

## 2017-10-25 RX ADMIN — ARIPIPRAZOLE 5 MG: 10 TABLET ORAL at 09:17

## 2017-10-25 RX ADMIN — LISINOPRIL 2.5 MG: 2.5 TABLET ORAL at 09:17

## 2017-10-25 RX ADMIN — TOPIRAMATE 25 MG: 25 TABLET ORAL at 09:17

## 2017-10-25 RX ADMIN — ASPIRIN 81 MG 81 MG: 81 TABLET ORAL at 09:20

## 2017-10-25 ASSESSMENT — PAIN SCALES - GENERAL: PAINLEVEL_OUTOF10: 3

## 2017-10-25 NOTE — DISCHARGE SUMMARY
DISCHARGE SUMMARY      Patient ID:  Ann Jacques  [id number]  61 y.o.  1960    Admit date: 10/20/2017    Discharge date and time: 10/25/17    Admitting Physician: Benitez Samson MD     Discharge Physician: Dr Lanie Price MD    Admission Diagnoses: Substance induced mood disorder [facility]) [F19.94]    Admission Condition: poor    Discharged Condition: stable    Admission Circumstance:   Ann Jacques  is a 61 y.o. male with history of treatment for Bipolar Disorder, and Polysubstance dependence who was admitted from the ER due to depressed mood, and suicidal ideations. Current stressors include going back to work, which increased his anxiety. He reported he started using again drugs, and had overdosed on heroin. When interviewed today, the patient said that \"everything was going well, but he could not sleep for 2-3 days, after he started working 2-3 weeks ago. He said he had increased anxiety after starting working, and that he \"took dope\", heroine, cocaine, and Xanax. He said he has been feeling increased anxiety, poor appetite, poor sleep, and had been feeling depressed, \"wanted to go to sleep and not wake up\". He said he was hearing his  mother talk to him, and was \"seeing shadows\". He denied having any homicidal ideations, but was more irritable, and got into fights. He did acknowledge feeling paranoid, with non-specific paranoia. He denied having any menon. He said he was on Suboxone, and had also been prescribed Seroquel, and Clorazepate (confirmed with pharmacy; the [facility] report did not confirm or find the prescriptions).       PAST PSYCHIATRIC HISTORY: the patient reported a history of being diagnosed with Bipolar disorder, and \"depression\" He had previous psychiatric admissions, including at [facility].         PAST MEDICAL/PSYCHIATRIC HISTORY:   Past Medical History:   Diagnosis Date    Abnormality of gait and mobility     Alcohol abuse 2014    Anxiety     Ataxia     CAD (coronary artery disease)     CHF (congestive heart failure) (HCC)     Chronic back pain     Cocaine abuse 2014    Depression     Heroin abuse 2014    History of hepatitis C     Hyperlipidemia     Hypertension     Late effects of CVA (cerebrovascular accident)     Leukocytosis     Myocardial infarct, old 2010    S/P CABG x 3     Tobacco abuse 2014    Transient ischemic attack        FAMILY/SOCIAL HISTORY:  Family History   Problem Relation Age of Onset    Family history unknown: Yes     Social History     Social History    Marital status:      Spouse name: N/A    Number of children: N/A    Years of education: N/A     Occupational History    Not on file. Social History Main Topics    Smoking status: Current Every Day Smoker     Packs/day: 0.50     Years: 45.00     Types: Cigarettes    Smokeless tobacco: Not on file    Alcohol use Yes      Comment: social    Drug use:      Frequency: 1.0 time per week     Types: Opiates , Cocaine, Other-see comments      Comment: heroin, crack    Sexual activity: Yes     Partners: Female     Other Topics Concern    Not on file     Social History Narrative    The patient lives with a friend in a two story home with bedrooms and bathrooms on both levels. His social support includes his friend. He has a walker and a cane at home. It is not indicated that he was receiving community services prior to admission. He wears eye glasses and upper dentures. He does not have history of falls prior to admission. He was independent with mobility and self care prior to admission. His goal is to feel better.        MEDICATIONS:    Current Facility-Administered Medications:     buprenorphine-naloxone (SUBOXONE) 2-0.5 MG SL tablet 0.5 tablet, 0.5 tablet, Sublingual, TID, Charli Sotomayor MD    gabapentin (NEURONTIN) capsule 200 mg, 200 mg, Oral, TID, Charli Sotomayor MD, 200 mg at 10/25/17 0917    ARIPiprazole (ABILIFY) tablet 5 mg, 5 mg, Oral, Daily, Tree Scarce SI/HI  Appetite:  [x] Normal  [] Increased  [] Decreased    Sleep:       [x] Normal  [] Fair       [] Poor            Energy:    [x] Normal  [] Increased  [] Decreased     SI [] Present  [x] Absent  HI  []Present  [x] Absent   Aggression:  [] yes  [] no  Patient is [x] able  [] unable to CONTRACT FOR SAFETY   Medication side effects(SE):  [x] None(Psych. Meds.) [] Other      Mental Status Examination on discharge:    Level of consciousness:  within normal limits   Appearance:  well-appearing  Behavior/Motor:  no abnormalities noted  Attitude toward examiner:  attentive and good eye contact  Speech:  spontaneous, normal rate and normal volume   Mood: euthymic  Affect:  mood congruent  Thought processes:  linear and goal directed   Thought content:  Suicidal Ideation:  denies suicidal ideation  Delusions:  no evidence of delusions  Perceptual Disturbance:  denies any perceptual disturbance  Cognition:  oriented to person, place, and time   Concentration intact  Memory intact  Insight good   Judgement fair   Fund of Knowledge adequate      ASSESSMENT:  Patient symptoms are:  [x] Well controlled  [x] Improving  [] Worsening  [] No change      Diagnosis:  Principal Problem:    Bipolar affective disorder ([facility]  Active Problems:    Polydrug abuse      LABS:    No results for input(s): WBC, HGB, PLT in the last 72 hours. Recent Labs      10/22/17   1335   NA  141   K  4.2   CL  103   CO2  24   BUN  15   CREATININE  1.59*   GLUCOSE  99     No results for input(s): BILITOT, ALKPHOS, AST, ALT in the last 72 hours.   Lab Results   Component Value Date    LABAMPH Neg 10/20/2017    BARBSCNU Neg 10/20/2017    LABBENZ POSITIVE 10/20/2017    LABBENZ DTCD 09/14/2012    OPIATESCREENURINE Neg 10/20/2017    PHENCYCLIDINESCREENURINE Neg 10/20/2017    ETOH <10 10/20/2017     Lab Results   Component Value Date    TSH 2.140 10/20/2017     Lab Results   Component Value Date    LITHIUM <0.1 (LL) 04/14/2014     Lab Results   Component Value your doctor or other care provider to review them with you.                 TIME SPEND - 35 MINUTES TO COMPLETE THE EVALUATION, DISCHARGE SUMMARY, MEDICATION RECONCILIATION AND FOLLOW UP CARE     Jarret Alvarez  10/25/2017  10:00 AM

## 2017-10-25 NOTE — PROGRESS NOTES
Pt denies all no SI/HI or AVH. Patient states he is feeling so much better. Pt denies dep or anxiety and is ready for discharge. Pt out social on the unit with select peers. Appetite good and sleep is good.  Electronically signed by Maddy Davenport LPN on 27/49/3228 at 11:16 AM'

## 2017-10-25 NOTE — BH NOTE
Group Therapy Note    Date: 10/24/2017  Start Time: 2000  End Time:  2015  Number of Participants: 14    Type of Group: Recreational      Notes:Positive Thinking Activitiy    Status After Intervention:  Improved    Participation Level: Interactive    Participation Quality: Appropriate and Attentive      Speech:  normal      Thought Process/Content: Logical      Affective Functioning: Congruent      Mood: euthymic      Level of consciousness:  Alert      Response to Learning: Able to verbalize/acknowledge new learning      Endings: None Reported    Modes of Intervention: Support, Exploration and Activity      Discipline Responsible: Licensed Practical Nurse      Signature:  Danay Frey LPN

## 2017-10-25 NOTE — PROGRESS NOTES
Affect and mood stable  Denied suicidal/homicidal ideation  Reviewed and verbalized understanding of all instructions  Belongings returned to patient  Discharged at this time with cab voucher for transport home

## 2017-10-25 NOTE — PROGRESS NOTES
Pt. declined to attend the 0900 community meeting, despite staff encouragement. Electronically signed by Ely Dumont, [address] on 10/25/2017 at 9:59 AM

## 2017-10-25 NOTE — PROGRESS NOTES
Patient did not attend the 1100 skills group due to getting discharged.  Electronically signed by Lemuel Rojo, [address] on 10/25/2017 at 2:06 PM

## 2017-10-27 PROCEDURE — 93010 ELECTROCARDIOGRAM REPORT: CPT | Performed by: INTERNAL MEDICINE

## 2018-01-25 ENCOUNTER — HOSPITAL ENCOUNTER (EMERGENCY)
Age: 58
Discharge: PSYCHIATRIC OR SUBSTANCE USE FACILITY | End: 2018-01-26
Payer: COMMERCIAL

## 2018-01-25 DIAGNOSIS — F19.94 SUBSTANCE INDUCED MOOD DISORDER (HCC): Primary | ICD-10-CM

## 2018-01-25 LAB
ALBUMIN SERPL-MCNC: 4.3 G/DL (ref 3.9–4.9)
ALP BLD-CCNC: 68 U/L (ref 35–104)
ALT SERPL-CCNC: 40 U/L (ref 0–41)
AMPHETAMINE SCREEN, URINE: ABNORMAL
ANION GAP SERPL CALCULATED.3IONS-SCNC: 13 MEQ/L (ref 7–13)
AST SERPL-CCNC: 33 U/L (ref 0–40)
BACTERIA: NORMAL /HPF
BARBITURATE SCREEN URINE: ABNORMAL
BASOPHILS ABSOLUTE: 0 K/UL (ref 0–0.2)
BASOPHILS RELATIVE PERCENT: 0.4 %
BENZODIAZEPINE SCREEN, URINE: ABNORMAL
BILIRUB SERPL-MCNC: 0.3 MG/DL (ref 0–1.2)
BILIRUBIN URINE: ABNORMAL
BLOOD, URINE: NEGATIVE
BUN BLDV-MCNC: 20 MG/DL (ref 6–20)
CALCIUM SERPL-MCNC: 9.1 MG/DL (ref 8.6–10.2)
CANNABINOID SCREEN URINE: ABNORMAL
CHLORIDE BLD-SCNC: 100 MEQ/L (ref 98–107)
CK MB: 6.3 NG/ML (ref 0–6.7)
CLARITY: CLEAR
CO2: 26 MEQ/L (ref 22–29)
COCAINE METABOLITE SCREEN URINE: POSITIVE
COLOR: ABNORMAL
CREAT SERPL-MCNC: 1.14 MG/DL (ref 0.7–1.2)
CREATINE KINASE-MB INDEX: 2.9 % (ref 0–3.5)
EKG ATRIAL RATE: 72 BPM
EKG P AXIS: 77 DEGREES
EKG P-R INTERVAL: 128 MS
EKG Q-T INTERVAL: 458 MS
EKG QRS DURATION: 142 MS
EKG QTC CALCULATION (BAZETT): 501 MS
EKG R AXIS: 100 DEGREES
EKG T AXIS: 52 DEGREES
EKG VENTRICULAR RATE: 72 BPM
EOSINOPHILS ABSOLUTE: 0 K/UL (ref 0–0.7)
EOSINOPHILS RELATIVE PERCENT: 0.6 %
EPITHELIAL CELLS, UA: NORMAL /HPF
ETHANOL PERCENT: NORMAL G/DL
ETHANOL: <10 MG/DL (ref 0–0.08)
GFR AFRICAN AMERICAN: >60
GFR NON-AFRICAN AMERICAN: >60
GLOBULIN: 2.7 G/DL (ref 2.3–3.5)
GLUCOSE BLD-MCNC: 82 MG/DL (ref 74–109)
GLUCOSE URINE: NEGATIVE MG/DL
HCT VFR BLD CALC: 44.2 % (ref 42–52)
HEMOGLOBIN: 14.8 G/DL (ref 14–18)
KETONES, URINE: ABNORMAL MG/DL
LEUKOCYTE ESTERASE, URINE: ABNORMAL
LYMPHOCYTES ABSOLUTE: 1.4 K/UL (ref 1–4.8)
LYMPHOCYTES RELATIVE PERCENT: 20.7 %
Lab: ABNORMAL
MCH RBC QN AUTO: 31.5 PG (ref 27–31.3)
MCHC RBC AUTO-ENTMCNC: 33.5 % (ref 33–37)
MCV RBC AUTO: 93.9 FL (ref 80–100)
MONOCYTES ABSOLUTE: 0.5 K/UL (ref 0.2–0.8)
MONOCYTES RELATIVE PERCENT: 6.7 %
MUCUS: PRESENT
NEUTROPHILS ABSOLUTE: 4.9 K/UL (ref 1.4–6.5)
NEUTROPHILS RELATIVE PERCENT: 71.6 %
NITRITE, URINE: NEGATIVE
OPIATE SCREEN URINE: ABNORMAL
PDW BLD-RTO: 14.5 % (ref 11.5–14.5)
PH UA: 6 (ref 5–9)
PHENCYCLIDINE SCREEN URINE: ABNORMAL
PLATELET # BLD: 163 K/UL (ref 130–400)
POTASSIUM SERPL-SCNC: 4.7 MEQ/L (ref 3.5–5.1)
PROTEIN UA: 30 MG/DL
RBC # BLD: 4.71 M/UL (ref 4.7–6.1)
RBC UA: NORMAL /HPF (ref 0–2)
RENAL EPITHELIAL, UA: NORMAL /HPF
SODIUM BLD-SCNC: 139 MEQ/L (ref 132–144)
SPECIFIC GRAVITY UA: 1.03 (ref 1–1.03)
TOTAL CK: 220 U/L (ref 0–190)
TOTAL PROTEIN: 7 G/DL (ref 6.4–8.1)
TSH SERPL DL<=0.05 MIU/L-ACNC: 1.15 UIU/ML (ref 0.27–4.2)
UROBILINOGEN, URINE: 1 E.U./DL
WBC # BLD: 6.9 K/UL (ref 4.8–10.8)
WBC UA: NORMAL /HPF (ref 0–5)

## 2018-01-25 PROCEDURE — 6360000002 HC RX W HCPCS: Performed by: NURSE PRACTITIONER

## 2018-01-25 PROCEDURE — 81001 URINALYSIS AUTO W/SCOPE: CPT

## 2018-01-25 PROCEDURE — G0480 DRUG TEST DEF 1-7 CLASSES: HCPCS

## 2018-01-25 PROCEDURE — 80053 COMPREHEN METABOLIC PANEL: CPT

## 2018-01-25 PROCEDURE — 82553 CREATINE MB FRACTION: CPT

## 2018-01-25 PROCEDURE — 36415 COLL VENOUS BLD VENIPUNCTURE: CPT

## 2018-01-25 PROCEDURE — 82550 ASSAY OF CK (CPK): CPT

## 2018-01-25 PROCEDURE — 85025 COMPLETE CBC W/AUTO DIFF WBC: CPT

## 2018-01-25 PROCEDURE — 80307 DRUG TEST PRSMV CHEM ANLYZR: CPT

## 2018-01-25 PROCEDURE — 6370000000 HC RX 637 (ALT 250 FOR IP): Performed by: NURSE PRACTITIONER

## 2018-01-25 PROCEDURE — 84443 ASSAY THYROID STIM HORMONE: CPT

## 2018-01-25 PROCEDURE — 99284 EMERGENCY DEPT VISIT MOD MDM: CPT

## 2018-01-25 PROCEDURE — 93005 ELECTROCARDIOGRAM TRACING: CPT

## 2018-01-25 RX ORDER — ACETAMINOPHEN 500 MG
1000 TABLET ORAL ONCE
Status: COMPLETED | OUTPATIENT
Start: 2018-01-25 | End: 2018-01-25

## 2018-01-25 RX ORDER — DIPHENHYDRAMINE HCL 25 MG
25 TABLET ORAL
Status: COMPLETED | OUTPATIENT
Start: 2018-01-25 | End: 2018-01-25

## 2018-01-25 RX ORDER — LORAZEPAM 1 MG/1
1 TABLET ORAL ONCE
Status: COMPLETED | OUTPATIENT
Start: 2018-01-25 | End: 2018-01-25

## 2018-01-25 RX ORDER — ONDANSETRON 4 MG/1
4 TABLET, ORALLY DISINTEGRATING ORAL ONCE
Status: COMPLETED | OUTPATIENT
Start: 2018-01-25 | End: 2018-01-25

## 2018-01-25 RX ADMIN — LORAZEPAM 1 MG: 1 TABLET ORAL at 16:09

## 2018-01-25 RX ADMIN — ONDANSETRON 4 MG: 4 TABLET, ORALLY DISINTEGRATING ORAL at 16:09

## 2018-01-25 RX ADMIN — ACETAMINOPHEN 1000 MG: 500 TABLET ORAL at 16:08

## 2018-01-25 RX ADMIN — DIPHENHYDRAMINE HCL 25 MG: 25 TABLET ORAL at 16:09

## 2018-01-25 ASSESSMENT — PAIN SCALES - GENERAL
PAINLEVEL_OUTOF10: 8
PAINLEVEL_OUTOF10: 8

## 2018-01-25 ASSESSMENT — ENCOUNTER SYMPTOMS
VOMITING: 0
BACK PAIN: 0
COLOR CHANGE: 0
CONSTIPATION: 0
SHORTNESS OF BREATH: 0
SORE THROAT: 0
VOICE CHANGE: 0
TROUBLE SWALLOWING: 0
NAUSEA: 0
DIARRHEA: 0
ABDOMINAL PAIN: 0
COUGH: 0

## 2018-01-25 ASSESSMENT — PAIN DESCRIPTION - DESCRIPTORS: DESCRIPTORS: JABBING

## 2018-01-25 ASSESSMENT — ACTIVITIES OF DAILY LIVING (ADL): EFFECT OF PAIN ON DAILY ACTIVITIES: WALKING

## 2018-01-25 ASSESSMENT — PAIN DESCRIPTION - PAIN TYPE: TYPE: CHRONIC PAIN

## 2018-01-25 ASSESSMENT — PAIN DESCRIPTION - ORIENTATION: ORIENTATION: RIGHT

## 2018-01-25 ASSESSMENT — PAIN DESCRIPTION - LOCATION: LOCATION: ABDOMEN

## 2018-01-25 ASSESSMENT — PAIN DESCRIPTION - PROGRESSION: CLINICAL_PROGRESSION: NOT CHANGED

## 2018-01-25 ASSESSMENT — PAIN DESCRIPTION - FREQUENCY: FREQUENCY: CONTINUOUS

## 2018-01-25 NOTE — ED PROVIDER NOTES
Constitutional: Negative for appetite change and fever. HENT: Negative for drooling, ear pain, sore throat, trouble swallowing and voice change. Respiratory: Negative for cough and shortness of breath. Cardiovascular: Negative for chest pain. Gastrointestinal: Negative for abdominal pain, constipation, diarrhea, nausea and vomiting. Genitourinary: Negative for decreased urine volume and dysuria. Musculoskeletal: Negative for arthralgias and back pain. Skin: Negative for color change. Neurological: Negative for dizziness, weakness, light-headedness and headaches. Psychiatric/Behavioral: Positive for hallucinations and suicidal ideas. Negative for agitation and behavioral problems. The patient is nervous/anxious and is hyperactive. Except as noted above the remainder of the review of systems was reviewed and negative. PAST MEDICAL HISTORY     Past Medical History:   Diagnosis Date    Abnormality of gait and mobility     Alcohol abuse 2014    Anxiety     Ataxia     CAD (coronary artery disease)     CHF (congestive heart failure) (HCC)     Chronic back pain     Cocaine abuse 2014    Depression     Heroin abuse 2014    History of hepatitis C     Hyperlipidemia     Hypertension     Late effects of CVA (cerebrovascular accident)     Leukocytosis     Myocardial infarct, old 2010    S/P CABG x 3     Tobacco abuse 2014    Transient ischemic attack        SURGICAL HISTORY       Past Surgical History:   Procedure Laterality Date    CORONARY ARTERY BYPASS GRAFT      EXCISION / BIOPSY SKIN LESION OF ARM N/A 4/8/2017    I&D DEBRIDEMENT NECROTIC WOUND ABSCESS LEFT MEDIAL ELBOW AND RIGHT FOREARM  performed by Christian Pulido MD at [address] [place] [address] ECHOCARDIOGRAM  5/15/2013       CURRENT MEDICATIONS       Previous Medications    ALBUTEROL (PROAIR HFA) 108 (90 BASE) MCG/ACT INHALER    Inhale 2 puffs into the lungs every 6 hours as needed for Wheezing.     ARIPIPRAZOLE

## 2018-01-25 NOTE — ED NOTES
Urine specimen given. Pt changed into hospital clothes and belongings will be logged and secured in locker by security. Leona Prader  [place], [facility]  01/25/18 4556

## 2018-01-26 VITALS
DIASTOLIC BLOOD PRESSURE: 77 MMHG | WEIGHT: 149 LBS | HEIGHT: 69 IN | HEART RATE: 57 BPM | RESPIRATION RATE: 20 BRPM | BODY MASS INDEX: 22.07 KG/M2 | TEMPERATURE: 98.5 F | SYSTOLIC BLOOD PRESSURE: 143 MMHG | OXYGEN SATURATION: 98 %

## 2018-01-26 PROBLEM — F33.9 MAJOR DEPRESSIVE DISORDER, RECURRENT (HCC): Status: ACTIVE | Noted: 2018-01-26

## 2018-01-26 NOTE — ED NOTES
Report received. Assumed care of patient. Patient resting comfortably in chair. Respirations even and unlabored. Awaiting placement.      Qi Caballero RN  01/26/18 2370
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
## 2018-01-28 ASSESSMENT — ENCOUNTER SYMPTOMS
NAUSEA: 0
BACK PAIN: 0
COUGH: 0
DIARRHEA: 0
CONSTIPATION: 0
COLOR CHANGE: 0
VOICE CHANGE: 0
TROUBLE SWALLOWING: 0
VOMITING: 0
ABDOMINAL PAIN: 0
SORE THROAT: 0
SHORTNESS OF BREATH: 0

## 2018-02-06 ENCOUNTER — APPOINTMENT (OUTPATIENT)
Dept: GENERAL RADIOLOGY | Age: 58
DRG: 753 | End: 2018-02-06
Payer: COMMERCIAL

## 2018-02-06 ENCOUNTER — HOSPITAL ENCOUNTER (INPATIENT)
Age: 58
LOS: 6 days | Discharge: HOME | DRG: 753 | End: 2018-02-13
Attending: PSYCHIATRY & NEUROLOGY | Admitting: PSYCHIATRY & NEUROLOGY
Payer: COMMERCIAL

## 2018-02-06 ENCOUNTER — APPOINTMENT (OUTPATIENT)
Dept: CT IMAGING | Age: 58
DRG: 753 | End: 2018-02-06
Payer: COMMERCIAL

## 2018-02-06 DIAGNOSIS — N30.00 ACUTE CYSTITIS WITHOUT HEMATURIA: Primary | ICD-10-CM

## 2018-02-06 DIAGNOSIS — F32.A DEPRESSION, UNSPECIFIED DEPRESSION TYPE: ICD-10-CM

## 2018-02-06 LAB
ALBUMIN SERPL-MCNC: 4.2 G/DL (ref 3.9–4.9)
ALP BLD-CCNC: 66 U/L (ref 35–104)
ALT SERPL-CCNC: 33 U/L (ref 0–41)
AMPHETAMINE SCREEN, URINE: ABNORMAL
ANION GAP SERPL CALCULATED.3IONS-SCNC: 16 MEQ/L (ref 7–13)
AST SERPL-CCNC: 30 U/L (ref 0–40)
BACTERIA: ABNORMAL /HPF
BARBITURATE SCREEN URINE: ABNORMAL
BASOPHILS ABSOLUTE: 0.1 K/UL (ref 0–0.2)
BASOPHILS RELATIVE PERCENT: 0.9 %
BENZODIAZEPINE SCREEN, URINE: ABNORMAL
BILIRUB SERPL-MCNC: 0.3 MG/DL (ref 0–1.2)
BILIRUBIN URINE: ABNORMAL
BLOOD, URINE: NEGATIVE
BUN BLDV-MCNC: 26 MG/DL (ref 6–20)
CALCIUM SERPL-MCNC: 9.1 MG/DL (ref 8.6–10.2)
CANNABINOID SCREEN URINE: ABNORMAL
CASTS: ABNORMAL /LPF
CHLORIDE BLD-SCNC: 102 MEQ/L (ref 98–107)
CK MB: 5.4 NG/ML (ref 0–6.7)
CLARITY: CLEAR
CO2: 23 MEQ/L (ref 22–29)
COCAINE METABOLITE SCREEN URINE: POSITIVE
COLOR: ABNORMAL
CREAT SERPL-MCNC: 1.64 MG/DL (ref 0.7–1.2)
CREATINE KINASE-MB INDEX: 0.8 % (ref 0–3.5)
EOSINOPHILS ABSOLUTE: 0.1 K/UL (ref 0–0.7)
EOSINOPHILS RELATIVE PERCENT: 1.3 %
EPITHELIAL CELLS, UA: ABNORMAL /HPF
ETHANOL PERCENT: NORMAL G/DL
ETHANOL: <10 MG/DL (ref 0–0.08)
GFR AFRICAN AMERICAN: 52.4
GFR NON-AFRICAN AMERICAN: 43.3
GLOBULIN: 2.9 G/DL (ref 2.3–3.5)
GLUCOSE BLD-MCNC: 85 MG/DL (ref 74–109)
GLUCOSE URINE: NEGATIVE MG/DL
HCT VFR BLD CALC: 41.3 % (ref 42–52)
HEMOGLOBIN: 13.8 G/DL (ref 14–18)
KETONES, URINE: NEGATIVE MG/DL
LEUKOCYTE ESTERASE, URINE: ABNORMAL
LYMPHOCYTES ABSOLUTE: 1.9 K/UL (ref 1–4.8)
LYMPHOCYTES RELATIVE PERCENT: 24.2 %
Lab: ABNORMAL
MCH RBC QN AUTO: 31.5 PG (ref 27–31.3)
MCHC RBC AUTO-ENTMCNC: 33.5 % (ref 33–37)
MCV RBC AUTO: 93.9 FL (ref 80–100)
MONOCYTES ABSOLUTE: 0.7 K/UL (ref 0.2–0.8)
MONOCYTES RELATIVE PERCENT: 9.7 %
NEUTROPHILS ABSOLUTE: 4.9 K/UL (ref 1.4–6.5)
NEUTROPHILS RELATIVE PERCENT: 63.9 %
NITRITE, URINE: NEGATIVE
OPIATE SCREEN URINE: POSITIVE
PDW BLD-RTO: 14.8 % (ref 11.5–14.5)
PH UA: 6 (ref 5–9)
PHENCYCLIDINE SCREEN URINE: ABNORMAL
PLATELET # BLD: 213 K/UL (ref 130–400)
POTASSIUM SERPL-SCNC: 4.4 MEQ/L (ref 3.5–5.1)
PROTEIN UA: 30 MG/DL
RBC # BLD: 4.4 M/UL (ref 4.7–6.1)
RBC UA: ABNORMAL /HPF (ref 0–2)
SODIUM BLD-SCNC: 141 MEQ/L (ref 132–144)
SPECIFIC GRAVITY UA: 1.02 (ref 1–1.03)
TOTAL CK: 706 U/L (ref 0–190)
TOTAL PROTEIN: 7.1 G/DL (ref 6.4–8.1)
TSH SERPL DL<=0.05 MIU/L-ACNC: 2.25 UIU/ML (ref 0.27–4.2)
URINE REFLEX TO CULTURE: YES
UROBILINOGEN, URINE: 1 E.U./DL
VALPROIC ACID LEVEL: 8.3 UG/ML (ref 50–100)
WBC # BLD: 7.7 K/UL (ref 4.8–10.8)
WBC UA: ABNORMAL /HPF (ref 0–5)

## 2018-02-06 PROCEDURE — 80307 DRUG TEST PRSMV CHEM ANLYZR: CPT

## 2018-02-06 PROCEDURE — 70450 CT HEAD/BRAIN W/O DYE: CPT

## 2018-02-06 PROCEDURE — 36415 COLL VENOUS BLD VENIPUNCTURE: CPT

## 2018-02-06 PROCEDURE — G0480 DRUG TEST DEF 1-7 CLASSES: HCPCS

## 2018-02-06 PROCEDURE — 80164 ASSAY DIPROPYLACETIC ACD TOT: CPT

## 2018-02-06 PROCEDURE — 87077 CULTURE AEROBIC IDENTIFY: CPT

## 2018-02-06 PROCEDURE — 99285 EMERGENCY DEPT VISIT HI MDM: CPT

## 2018-02-06 PROCEDURE — 84443 ASSAY THYROID STIM HORMONE: CPT

## 2018-02-06 PROCEDURE — 81001 URINALYSIS AUTO W/SCOPE: CPT

## 2018-02-06 PROCEDURE — 82553 CREATINE MB FRACTION: CPT

## 2018-02-06 PROCEDURE — 85025 COMPLETE CBC W/AUTO DIFF WBC: CPT

## 2018-02-06 PROCEDURE — 82550 ASSAY OF CK (CPK): CPT

## 2018-02-06 PROCEDURE — 72170 X-RAY EXAM OF PELVIS: CPT

## 2018-02-06 PROCEDURE — 87086 URINE CULTURE/COLONY COUNT: CPT

## 2018-02-06 PROCEDURE — 80053 COMPREHEN METABOLIC PANEL: CPT

## 2018-02-06 RX ORDER — GABAPENTIN 300 MG/1
300 CAPSULE ORAL 3 TIMES DAILY
Status: ON HOLD | COMMUNITY
End: 2018-02-13 | Stop reason: HOSPADM

## 2018-02-06 ASSESSMENT — ENCOUNTER SYMPTOMS
ABDOMINAL PAIN: 0
COUGH: 0
NAUSEA: 0
SHORTNESS OF BREATH: 0
DIARRHEA: 0
VOMITING: 0
COLOR CHANGE: 0
RHINORRHEA: 0
BLOOD IN STOOL: 0

## 2018-02-07 PROCEDURE — 6370000000 HC RX 637 (ALT 250 FOR IP): Performed by: PSYCHIATRY & NEUROLOGY

## 2018-02-07 PROCEDURE — 6360000002 HC RX W HCPCS: Performed by: EMERGENCY MEDICINE

## 2018-02-07 PROCEDURE — 6370000000 HC RX 637 (ALT 250 FOR IP): Performed by: NURSE PRACTITIONER

## 2018-02-07 PROCEDURE — 94664 DEMO&/EVAL PT USE INHALER: CPT

## 2018-02-07 PROCEDURE — 99223 1ST HOSP IP/OBS HIGH 75: CPT | Performed by: PSYCHIATRY & NEUROLOGY

## 2018-02-07 PROCEDURE — 6370000000 HC RX 637 (ALT 250 FOR IP): Performed by: PERSONAL EMERGENCY RESPONSE ATTENDANT

## 2018-02-07 PROCEDURE — 1240000000 HC EMOTIONAL WELLNESS R&B

## 2018-02-07 PROCEDURE — 6360000002 HC RX W HCPCS: Performed by: PSYCHIATRY & NEUROLOGY

## 2018-02-07 RX ORDER — MAGNESIUM HYDROXIDE/ALUMINUM HYDROXICE/SIMETHICONE 120; 1200; 1200 MG/30ML; MG/30ML; MG/30ML
30 SUSPENSION ORAL PRN
Status: DISCONTINUED | OUTPATIENT
Start: 2018-02-07 | End: 2018-02-13 | Stop reason: HOSPADM

## 2018-02-07 RX ORDER — TRAZODONE HYDROCHLORIDE 50 MG/1
50 TABLET ORAL NIGHTLY PRN
Status: DISCONTINUED | OUTPATIENT
Start: 2018-02-07 | End: 2018-02-13 | Stop reason: HOSPADM

## 2018-02-07 RX ORDER — HALOPERIDOL 5 MG/ML
5 INJECTION INTRAMUSCULAR EVERY 4 HOURS PRN
Status: DISCONTINUED | OUTPATIENT
Start: 2018-02-07 | End: 2018-02-13 | Stop reason: HOSPADM

## 2018-02-07 RX ORDER — DIVALPROEX SODIUM 500 MG/1
500 TABLET, EXTENDED RELEASE ORAL DAILY
Status: DISCONTINUED | OUTPATIENT
Start: 2018-02-07 | End: 2018-02-07

## 2018-02-07 RX ORDER — CIPROFLOXACIN 500 MG/1
500 TABLET, FILM COATED ORAL EVERY 12 HOURS SCHEDULED
Status: COMPLETED | OUTPATIENT
Start: 2018-02-07 | End: 2018-02-12

## 2018-02-07 RX ORDER — ACETAMINOPHEN 80 MG
TABLET,CHEWABLE ORAL ONCE
Status: DISCONTINUED | OUTPATIENT
Start: 2018-02-07 | End: 2018-02-13 | Stop reason: HOSPADM

## 2018-02-07 RX ORDER — OXCARBAZEPINE 150 MG/1
150 TABLET, FILM COATED ORAL 2 TIMES DAILY
Status: DISCONTINUED | OUTPATIENT
Start: 2018-02-07 | End: 2018-02-12

## 2018-02-07 RX ORDER — ATORVASTATIN CALCIUM 80 MG/1
80 TABLET, FILM COATED ORAL NIGHTLY
Status: DISCONTINUED | OUTPATIENT
Start: 2018-02-07 | End: 2018-02-13 | Stop reason: HOSPADM

## 2018-02-07 RX ORDER — CLOPIDOGREL BISULFATE 75 MG/1
75 TABLET ORAL DAILY
Status: DISCONTINUED | OUTPATIENT
Start: 2018-02-07 | End: 2018-02-13 | Stop reason: HOSPADM

## 2018-02-07 RX ORDER — ASPIRIN 81 MG/1
81 TABLET, CHEWABLE ORAL DAILY
Status: DISCONTINUED | OUTPATIENT
Start: 2018-02-07 | End: 2018-02-13 | Stop reason: HOSPADM

## 2018-02-07 RX ORDER — HYDROXYZINE PAMOATE 50 MG/1
50 CAPSULE ORAL ONCE
Status: COMPLETED | OUTPATIENT
Start: 2018-02-07 | End: 2018-02-07

## 2018-02-07 RX ORDER — ALBUTEROL SULFATE 90 UG/1
2 AEROSOL, METERED RESPIRATORY (INHALATION) EVERY 6 HOURS PRN
Status: DISCONTINUED | OUTPATIENT
Start: 2018-02-07 | End: 2018-02-13 | Stop reason: HOSPADM

## 2018-02-07 RX ORDER — LISINOPRIL 20 MG/1
20 TABLET ORAL DAILY
Status: DISCONTINUED | OUTPATIENT
Start: 2018-02-07 | End: 2018-02-13 | Stop reason: HOSPADM

## 2018-02-07 RX ORDER — NICOTINE 21 MG/24HR
1 PATCH, TRANSDERMAL 24 HOURS TRANSDERMAL DAILY
Status: DISCONTINUED | OUTPATIENT
Start: 2018-02-07 | End: 2018-02-13 | Stop reason: HOSPADM

## 2018-02-07 RX ORDER — BENZTROPINE MESYLATE 1 MG/ML
2 INJECTION INTRAMUSCULAR; INTRAVENOUS 2 TIMES DAILY PRN
Status: DISCONTINUED | OUTPATIENT
Start: 2018-02-07 | End: 2018-02-13 | Stop reason: HOSPADM

## 2018-02-07 RX ORDER — BUPRENORPHINE HYDROCHLORIDE AND NALOXONE HYDROCHLORIDE DIHYDRATE 8; 2 MG/1; MG/1
1 TABLET SUBLINGUAL 2 TIMES DAILY
Status: DISCONTINUED | OUTPATIENT
Start: 2018-02-07 | End: 2018-02-13 | Stop reason: HOSPADM

## 2018-02-07 RX ORDER — HYDROXYZINE PAMOATE 50 MG/1
50 CAPSULE ORAL EVERY 6 HOURS PRN
Status: DISCONTINUED | OUTPATIENT
Start: 2018-02-07 | End: 2018-02-13 | Stop reason: HOSPADM

## 2018-02-07 RX ORDER — BUPRENORPHINE HYDROCHLORIDE AND NALOXONE HYDROCHLORIDE DIHYDRATE 8; 2 MG/1; MG/1
1 TABLET SUBLINGUAL DAILY
Status: DISCONTINUED | OUTPATIENT
Start: 2018-02-07 | End: 2018-02-07

## 2018-02-07 RX ORDER — CIPROFLOXACIN 500 MG/1
500 TABLET, FILM COATED ORAL ONCE
Status: COMPLETED | OUTPATIENT
Start: 2018-02-07 | End: 2018-02-07

## 2018-02-07 RX ORDER — ACETAMINOPHEN 325 MG/1
650 TABLET ORAL EVERY 4 HOURS PRN
Status: DISCONTINUED | OUTPATIENT
Start: 2018-02-07 | End: 2018-02-13 | Stop reason: HOSPADM

## 2018-02-07 RX ADMIN — CIPROFLOXACIN HYDROCHLORIDE 500 MG: 500 TABLET, FILM COATED ORAL at 21:04

## 2018-02-07 RX ADMIN — BUPRENORPHINE HYDROCHLORIDE AND NALOXONE HYDROCHLORIDE DIHYDRATE 1 TABLET: 8; 2 TABLET SUBLINGUAL at 21:04

## 2018-02-07 RX ADMIN — CIPROFLOXACIN HYDROCHLORIDE 500 MG: 500 TABLET, FILM COATED ORAL at 01:39

## 2018-02-07 RX ADMIN — OXCARBAZEPINE 150 MG: 150 TABLET ORAL at 21:05

## 2018-02-07 RX ADMIN — ASPIRIN 81 MG 81 MG: 81 TABLET ORAL at 14:13

## 2018-02-07 RX ADMIN — HYDROXYZINE PAMOATE 50 MG: 50 CAPSULE ORAL at 21:10

## 2018-02-07 RX ADMIN — METOPROLOL TARTRATE 12.5 MG: 25 TABLET ORAL at 21:05

## 2018-02-07 RX ADMIN — TRAZODONE HYDROCHLORIDE 50 MG: 50 TABLET ORAL at 21:10

## 2018-02-07 RX ADMIN — HYDROXYZINE PAMOATE 50 MG: 50 CAPSULE ORAL at 01:40

## 2018-02-07 RX ADMIN — OXCARBAZEPINE 150 MG: 150 TABLET ORAL at 14:13

## 2018-02-07 RX ADMIN — METOPROLOL TARTRATE 12.5 MG: 25 TABLET ORAL at 14:14

## 2018-02-07 RX ADMIN — ATORVASTATIN CALCIUM 80 MG: 80 TABLET, FILM COATED ORAL at 21:05

## 2018-02-07 RX ADMIN — VITAMIN D, TAB 1000IU (100/BT) 2000 UNITS: 25 TAB at 14:14

## 2018-02-07 RX ADMIN — LISINOPRIL 20 MG: 20 TABLET ORAL at 15:04

## 2018-02-07 RX ADMIN — CLOPIDOGREL BISULFATE 75 MG: 75 TABLET ORAL at 14:14

## 2018-02-07 RX ADMIN — BUPRENORPHINE HYDROCHLORIDE AND NALOXONE HYDROCHLORIDE DIHYDRATE 1 TABLET: 8; 2 TABLET SUBLINGUAL at 11:14

## 2018-02-07 RX ADMIN — BUPRENORPHINE HYDROCHLORIDE AND NALOXONE HYDROCHLORIDE DIHYDRATE 1 TABLET: 8; 2 TABLET SUBLINGUAL at 14:14

## 2018-02-07 ASSESSMENT — SLEEP AND FATIGUE QUESTIONNAIRES
DO YOU HAVE DIFFICULTY SLEEPING: YES
AVERAGE NUMBER OF SLEEP HOURS: 3
RESTFUL SLEEP: YES
DIFFICULTY ARISING: NO
DIFFICULTY STAYING ASLEEP: YES
DIFFICULTY FALLING ASLEEP: YES
DO YOU USE A SLEEP AID: YES

## 2018-02-07 ASSESSMENT — PATIENT HEALTH QUESTIONNAIRE - PHQ9
SUM OF ALL RESPONSES TO PHQ QUESTIONS 1-9: 16
SUM OF ALL RESPONSES TO PHQ QUESTIONS 1-9: 22

## 2018-02-07 ASSESSMENT — PAIN - FUNCTIONAL ASSESSMENT: PAIN_FUNCTIONAL_ASSESSMENT: 0-10

## 2018-02-07 ASSESSMENT — PAIN SCALES - GENERAL
PAINLEVEL_OUTOF10: 8
PAINLEVEL_OUTOF10: 7

## 2018-02-07 ASSESSMENT — LIFESTYLE VARIABLES: HISTORY_ALCOHOL_USE: YES
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
## 2018-02-07 NOTE — ED NOTES
Lab called for blood work ordered      Yadio TOYA Rojo, [facility]  02/06/18 2026

## 2018-02-07 NOTE — CARE COORDINATION
Brief Intervention and Referral to Treatment Summary    Patient was provided PHQ-9, AUDIT and DAST Screening:      PHQ-9 Score:16  AUDIT Score:  0  DAST Score: 4    Patients substance use is considered    Low Risk/Healthy  Moderate Risk  Harmful x  Dependent    Patients depression is considered:    Minimal  Mild   Moderate  Moderately Severe x  Severe    Brief Education Was Provided    Patient was receptive  Patient was not receptive x      Brief Intervention Is Provided (Only for AUDIT or DAST)    Patient reports readiness to decrease and/or stop use and a plan was discussed   Patient denies readiness to decrease and/or stop use and a plan was not discussed x      Recommendations/Referrals for Brief and/or Specialized Treatment Provided to Patient  Patient was forthcoming about his drug history but denies any need for treatment. Patient was in court ordered drug treatment many years ago. Patient refused to participate in brief education/intervention at the time of this interview.      Janey TALLEY
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
## 2018-02-07 NOTE — ED NOTES
After review of pt with Dr Faith Garcia, order given to place pt with [facility] for [facility] transfer. Which is done.      Julissa Castle RN  02/07/18 0131

## 2018-02-07 NOTE — PROGRESS NOTES
Arrived to the unit- skin and clothing check completed- no noted redness/ abrasions/ contraband found- oriented to the unit

## 2018-02-07 NOTE — CONSULTS
(COGENTIN) injection 2 mg  2 mg Intramuscular BID PRN Moustapha Francis MD        magnesium hydroxide (MILK OF MAGNESIA) 400 MG/5ML suspension 30 mL  30 mL Oral Daily PRN Moustapha Francis MD        aluminum & magnesium hydroxide-simethicone (MAALOX) 831-948-68 MG/5ML suspension 30 mL  30 mL Oral PRN Moustapha Francis MD        pill splitter   Does not apply Once Moustapha Francis MD        OXcarbazepine (TRILEPTAL) tablet 150 mg  150 mg Oral BID Moustapha Francis MD   150 mg at 02/07/18 1413    lisinopril (PRINIVIL;ZESTRIL) tablet 20 mg  20 mg Oral Daily Lilo Eaton CNP        ciprofloxacin (CIPRO) tablet 500 mg  500 mg Oral 2 times per day Lilo Eaton CNP           ALLERGIES: Review of patient's allergies indicates no known allergies. REVIEW OF SYSTEM:   ROS as noted in HPI, 12 point ROS reviewed and otherwise negative. OBJECTIVE  PHYSICAL EXAM: /64   Pulse 73   Temp 97.7 °F (36.5 °C)   Resp 18   Ht 5' 10\" (1.778 m)   Wt 150 lb (68 kg)   SpO2 99%   BMI 21.52 kg/m²     General appearance:  No apparent distress, appears stated age and cooperative. HEENT:  Normal cephalic, atraumatic without obvious deformity. Pupils equal, round, and reactive to light. Extra ocular muscles intact. Conjunctivae/corneas clear. Neck: Supple, with full range of motion. No jugular venous distention. Trachea midline. Respiratory:  Normal respiratory effort. Clear to auscultation, bilaterally without Rales/Wheezes/Rhonchi. Cardiovascular:  Regular rate and rhythm with normal S1/S2 without murmurs, rubs or gallops. Abdomen: Soft, non-tender, non-distended with normal bowel sounds. Musculoskeletal:  No clubbing, cyanosis or edema bilaterally. Full range of motion without deformity. Skin: Skin color, texture, turgor normal.  No rashes or lesions. Neurologic:  Neurovascularly intact without any focal sensory/motor deficits.  Cranial nerves: II-XII intact, grossly non-focal.  Psychiatric:  Alert and

## 2018-02-07 NOTE — ED PROVIDER NOTES
SOCIAL HISTORY       Social History     Social History    Marital status:      Spouse name: N/A    Number of children: 1    Years of education: 15     Social History Main Topics    Smoking status: Current Every Day Smoker     Packs/day: 2.00     Years: 45.00     Types: Cigarettes    Smokeless tobacco: Current User    Alcohol use No      Comment: social    Drug use: Yes     Frequency: 1.0 time per week     Types: Opiates , Cocaine, Other-see comments      Comment: heroin, crack, xanax    Sexual activity: Yes     Partners: Female     Other Topics Concern    None     Social History Narrative    The patient lives with a friend in a two story home with bedrooms and bathrooms on both levels. His social support includes his friend. He has a walker and a cane at home. It is not indicated that he was receiving community services prior to admission. He wears eye glasses and upper dentures. He does not have history of falls prior to admission. He was independent with mobility and self care prior to admission. His goal is to feel better. PHYSICAL EXAM         ED Triage Vitals [02/06/18 1952]   BP Temp Temp Source Pulse Resp SpO2 Height Weight   (!) 154/91 98.1 °F (36.7 °C) Oral 96 18 97 % 5' 10\" (1.778 m) 150 lb (68 kg)       Physical Exam   Constitutional: He is oriented to person, place, and time. He appears well-developed and well-nourished. HENT:   Head: Normocephalic and atraumatic. Mouth/Throat: Oropharynx is clear and moist.   Eyes: Conjunctivae and EOM are normal. Pupils are equal, round, and reactive to light. Neck: Normal range of motion. Neck supple. No tracheal deviation present. Cardiovascular: Normal heart sounds and intact distal pulses. Pulmonary/Chest: Effort normal and breath sounds normal. No stridor. No respiratory distress. Abdominal: Soft. Bowel sounds are normal. He exhibits no distension and no mass. There is no tenderness.  There is no rebound and no

## 2018-02-07 NOTE — PROGRESS NOTES
Out in day room watching TV Cooperative Continues to have SI on and off Safe on unit Passive death wish

## 2018-02-07 NOTE — PROGRESS NOTES
[facility] EMERGENCY [facility] AT [place] Respiratory Therapy Evaluation   Current Order:  Albuterol 2p q6prn     Home Regimen: yes     Ordering Physician: emmanuel  Re-evaluation Date:  na     Diagnosis: pysch    Patient Status: Stable / Unstable + Physician notified    The following MDI Criteria must be met in order to convert aerosol to MDI with spacer. If unable to meet, MDI will be converted to aerosol:  []  Patient able to demonstrate the ability to use MDI effectively  []  Patient alert and cooperative  []  Patient able to take deep breath with 5-10 second hold  []  Medication(s) available in this delivery method   []  Peak flow greater than or equal to 200 ml/min            Current Order Substituted To  (same drug, same frequency)   Aerosol to MDI [] Albuterol Sulfate 0.083% unit dose by aerosol Albuterol Sulfate MDI 2 puffs by inhalation with spacer    [] Levalbuterol 1.25 mg unit dose by aerosol Levalbuterol MDI 2 puffs by inhalation with spacer    [] Levalbuterol 0.63 mg unit dose by aerosol Levalbuterol MDI 2 puffs by inhalation with spacer    [] Ipratropium Bromide 0.02% unit dose by aerosol Ipratropium Bromide MDI 2 puffs by inhalation with spacer    [] Duoneb (Ipratropium + Albuterol) unit dose by aerosol Ipratropium MDI + Albuterol MDI 2 puffs by inhalation w/spacer   MDI to Aerosol [] Albuterol Sulfate MDI Albuterol Sulfate 0.083% unit dose by aerosol    [] Levalbuterol MDI 2 puffs by inhalation Levalbuterol 1.25 mg unit dose by aerosol    [] Ipratropium Bromide MDI by inhalation Ipratropium Bromide 0.02% unit dose by aerosol    [] Combivent (Ipratropium + Albuterol) MDI by inhalation Duoneb (Ipratropium + Albuterol) unit dose by aerosol   Treatment Assessment [Frequency/Schedule]:  Change frequency to: ______________no change____________________________________per Protocol, P&T, MEC      Points 0 1 2 3 4   Pulmonary Status  Non-Smoker  []   Smoking history   < 20 pack years  []   Smoking history  ?  20 pack years  [x]

## 2018-02-08 LAB
EKG ATRIAL RATE: 54 BPM
EKG P AXIS: 65 DEGREES
EKG P-R INTERVAL: 134 MS
EKG Q-T INTERVAL: 498 MS
EKG QRS DURATION: 142 MS
EKG QTC CALCULATION (BAZETT): 472 MS
EKG R AXIS: 86 DEGREES
EKG T AXIS: 28 DEGREES
EKG VENTRICULAR RATE: 54 BPM
ORGANISM: ABNORMAL
URINE CULTURE, ROUTINE: ABNORMAL
URINE CULTURE, ROUTINE: ABNORMAL

## 2018-02-08 PROCEDURE — 1240000000 HC EMOTIONAL WELLNESS R&B

## 2018-02-08 PROCEDURE — 93005 ELECTROCARDIOGRAM TRACING: CPT

## 2018-02-08 PROCEDURE — 6370000000 HC RX 637 (ALT 250 FOR IP): Performed by: NURSE PRACTITIONER

## 2018-02-08 PROCEDURE — 99232 SBSQ HOSP IP/OBS MODERATE 35: CPT | Performed by: PSYCHIATRY & NEUROLOGY

## 2018-02-08 PROCEDURE — 6360000002 HC RX W HCPCS: Performed by: PSYCHIATRY & NEUROLOGY

## 2018-02-08 PROCEDURE — 6370000000 HC RX 637 (ALT 250 FOR IP): Performed by: PSYCHIATRY & NEUROLOGY

## 2018-02-08 RX ORDER — QUETIAPINE FUMARATE 50 MG/1
50 TABLET, FILM COATED ORAL NIGHTLY
Status: DISCONTINUED | OUTPATIENT
Start: 2018-02-08 | End: 2018-02-09

## 2018-02-08 RX ADMIN — ASPIRIN 81 MG 81 MG: 81 TABLET ORAL at 08:59

## 2018-02-08 RX ADMIN — CLOPIDOGREL BISULFATE 75 MG: 75 TABLET ORAL at 08:58

## 2018-02-08 RX ADMIN — METOPROLOL TARTRATE 12.5 MG: 25 TABLET ORAL at 08:58

## 2018-02-08 RX ADMIN — CIPROFLOXACIN HYDROCHLORIDE 500 MG: 500 TABLET, FILM COATED ORAL at 20:54

## 2018-02-08 RX ADMIN — LISINOPRIL 20 MG: 20 TABLET ORAL at 08:59

## 2018-02-08 RX ADMIN — CIPROFLOXACIN HYDROCHLORIDE 500 MG: 500 TABLET, FILM COATED ORAL at 08:59

## 2018-02-08 RX ADMIN — METOPROLOL TARTRATE 12.5 MG: 25 TABLET ORAL at 20:54

## 2018-02-08 RX ADMIN — VITAMIN D, TAB 1000IU (100/BT) 2000 UNITS: 25 TAB at 08:58

## 2018-02-08 RX ADMIN — OXCARBAZEPINE 150 MG: 150 TABLET ORAL at 20:53

## 2018-02-08 RX ADMIN — OXCARBAZEPINE 150 MG: 150 TABLET ORAL at 08:58

## 2018-02-08 RX ADMIN — BUPRENORPHINE HYDROCHLORIDE AND NALOXONE HYDROCHLORIDE DIHYDRATE 1 TABLET: 8; 2 TABLET SUBLINGUAL at 19:44

## 2018-02-08 RX ADMIN — BUPRENORPHINE HYDROCHLORIDE AND NALOXONE HYDROCHLORIDE DIHYDRATE 1 TABLET: 8; 2 TABLET SUBLINGUAL at 08:58

## 2018-02-08 RX ADMIN — HYDROXYZINE PAMOATE 50 MG: 50 CAPSULE ORAL at 11:54

## 2018-02-08 RX ADMIN — ATORVASTATIN CALCIUM 80 MG: 80 TABLET, FILM COATED ORAL at 20:54

## 2018-02-08 RX ADMIN — QUETIAPINE FUMARATE 50 MG: 50 TABLET ORAL at 20:54

## 2018-02-08 ASSESSMENT — PAIN DESCRIPTION - LOCATION
LOCATION: GROIN
LOCATION: GROIN

## 2018-02-08 ASSESSMENT — PAIN SCALES - GENERAL
PAINLEVEL_OUTOF10: 8
PAINLEVEL_OUTOF10: 6
PAINLEVEL_OUTOF10: 0
PAINLEVEL_OUTOF10: 8
PAINLEVEL_OUTOF10: 8

## 2018-02-08 NOTE — PROGRESS NOTES
Patient denies HI but does have a passive death wish with no plan. Patient wants sobriety and is on suboxone. Sleep is interrupted and patient states he has no appetite but is eating. Patient out in group room watching tv not social with other peers.  Electronically signed by Khanh Webster LPN on 2/2/4575 at 2:83 PM

## 2018-02-08 NOTE — CARE COORDINATION
Pt did not attend group despite staff encouragement.      Electronically signed by Sherlyn Eastman MSW, LSW on 2/8/2018 at 11:56 AM

## 2018-02-08 NOTE — PLAN OF CARE
Problem: Falls - Risk of  Goal: Absence of falls  Outcome: Met This Shift      Problem: Depressive Behavior With or Without Suicide Precautions:  Goal: Able to verbalize and/or display a decrease in depressive symptoms  Able to verbalize and/or display a decrease in depressive symptoms   Outcome: Ongoing    Goal: Ability to disclose and discuss suicidal ideas will improve  Ability to disclose and discuss suicidal ideas will improve   Outcome: Ongoing      Problem: Nutrition  Goal: Optimal nutrition therapy  Outcome: Ongoing
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
## 2018-02-08 NOTE — PLAN OF CARE
Problem: Falls - Risk of  Intervention: Fall risk assessment  58  Intervention: Postfall assessment  Not needed   Intervention: Fall precautions  Pt is now steady on his feet  Intervention: Toileting assistance  Not needed     Goal: Absence of falls  Outcome: Met This Shift      Problem: Depressive Behavior With or Without Suicide Precautions:  Goal: Able to verbalize and/or display a decrease in depressive symptoms  Able to verbalize and/or display a decrease in depressive symptoms   Outcome: Met This Shift    Goal: Ability to disclose and discuss suicidal ideas will improve  Ability to disclose and discuss suicidal ideas will improve   Outcome: Met This Shift      Problem: Nutrition  Goal: Optimal nutrition therapy  Outcome: Met This Shift

## 2018-02-08 NOTE — PROGRESS NOTES
Group Therapy Note    Date: 2/8/2018  Start Time: 1100  End Time:  5492  Number of Participants: 7    Type of Group: Psychoeducation    Wellness Binder Information  Module Name:    Session Number:      Patient's Goal:  \"to be positive\"    Notes:  Pt. attended the skill group.     Status After Intervention:  Improved    Participation Level: good    Participation Quality: Appropriate and Attentive      Speech:  normal      Thought Process/Content: Logical      Affective Functioning: Flat      Mood: calm    Level of consciousness:  Alert, Oriented x4 and Preoccupied      Response to Learning: Progressing to goal      Endings: None Reported    Modes of Intervention: Education, Support, Socialization and Activity      Discipline Responsible: Psychoeducational Specialist      Signature:  Minal Gonzalez

## 2018-02-09 PROCEDURE — 6370000000 HC RX 637 (ALT 250 FOR IP): Performed by: NURSE PRACTITIONER

## 2018-02-09 PROCEDURE — 1240000000 HC EMOTIONAL WELLNESS R&B

## 2018-02-09 PROCEDURE — 6360000002 HC RX W HCPCS: Performed by: PSYCHIATRY & NEUROLOGY

## 2018-02-09 PROCEDURE — 6370000000 HC RX 637 (ALT 250 FOR IP): Performed by: PSYCHIATRY & NEUROLOGY

## 2018-02-09 PROCEDURE — 99232 SBSQ HOSP IP/OBS MODERATE 35: CPT | Performed by: PSYCHIATRY & NEUROLOGY

## 2018-02-09 PROCEDURE — 93010 ELECTROCARDIOGRAM REPORT: CPT | Performed by: INTERNAL MEDICINE

## 2018-02-09 RX ORDER — QUETIAPINE FUMARATE 100 MG/1
100 TABLET, FILM COATED ORAL NIGHTLY
Status: DISCONTINUED | OUTPATIENT
Start: 2018-02-09 | End: 2018-02-13 | Stop reason: HOSPADM

## 2018-02-09 RX ADMIN — ACETAMINOPHEN 650 MG: 325 TABLET ORAL at 18:06

## 2018-02-09 RX ADMIN — QUETIAPINE FUMARATE 100 MG: 100 TABLET ORAL at 20:54

## 2018-02-09 RX ADMIN — LISINOPRIL 20 MG: 20 TABLET ORAL at 08:57

## 2018-02-09 RX ADMIN — ASPIRIN 81 MG 81 MG: 81 TABLET ORAL at 08:57

## 2018-02-09 RX ADMIN — BUPRENORPHINE HYDROCHLORIDE AND NALOXONE HYDROCHLORIDE DIHYDRATE 1 TABLET: 8; 2 TABLET SUBLINGUAL at 08:57

## 2018-02-09 RX ADMIN — VITAMIN D, TAB 1000IU (100/BT) 2000 UNITS: 25 TAB at 08:56

## 2018-02-09 RX ADMIN — CIPROFLOXACIN HYDROCHLORIDE 500 MG: 500 TABLET, FILM COATED ORAL at 08:57

## 2018-02-09 RX ADMIN — METOPROLOL TARTRATE 12.5 MG: 25 TABLET ORAL at 22:07

## 2018-02-09 RX ADMIN — BUPRENORPHINE HYDROCHLORIDE AND NALOXONE HYDROCHLORIDE DIHYDRATE 1 TABLET: 8; 2 TABLET SUBLINGUAL at 20:54

## 2018-02-09 RX ADMIN — OXCARBAZEPINE 150 MG: 150 TABLET ORAL at 08:57

## 2018-02-09 RX ADMIN — CLOPIDOGREL BISULFATE 75 MG: 75 TABLET ORAL at 08:56

## 2018-02-09 RX ADMIN — CIPROFLOXACIN HYDROCHLORIDE 500 MG: 500 TABLET, FILM COATED ORAL at 20:54

## 2018-02-09 RX ADMIN — TRAZODONE HYDROCHLORIDE 50 MG: 50 TABLET ORAL at 00:16

## 2018-02-09 RX ADMIN — HYDROXYZINE PAMOATE 50 MG: 50 CAPSULE ORAL at 13:58

## 2018-02-09 RX ADMIN — ATORVASTATIN CALCIUM 80 MG: 80 TABLET, FILM COATED ORAL at 20:54

## 2018-02-09 RX ADMIN — OXCARBAZEPINE 150 MG: 150 TABLET ORAL at 22:07

## 2018-02-09 RX ADMIN — METOPROLOL TARTRATE 12.5 MG: 25 TABLET ORAL at 08:56

## 2018-02-09 ASSESSMENT — PAIN DESCRIPTION - PROGRESSION: CLINICAL_PROGRESSION: GRADUALLY WORSENING

## 2018-02-09 ASSESSMENT — PAIN DESCRIPTION - FREQUENCY: FREQUENCY: CONTINUOUS

## 2018-02-09 ASSESSMENT — PAIN SCALES - GENERAL
PAINLEVEL_OUTOF10: 3
PAINLEVEL_OUTOF10: 8
PAINLEVEL_OUTOF10: 8
PAINLEVEL_OUTOF10: 5
PAINLEVEL_OUTOF10: 7
PAINLEVEL_OUTOF10: 8

## 2018-02-09 ASSESSMENT — PAIN DESCRIPTION - DESCRIPTORS: DESCRIPTORS: ACHING

## 2018-02-09 ASSESSMENT — PAIN DESCRIPTION - LOCATION
LOCATION: HIP

## 2018-02-09 ASSESSMENT — PAIN DESCRIPTION - ORIENTATION: ORIENTATION: LEFT

## 2018-02-09 ASSESSMENT — PAIN DESCRIPTION - ONSET: ONSET: ON-GOING

## 2018-02-09 ASSESSMENT — PAIN DESCRIPTION - PAIN TYPE: TYPE: ACUTE PAIN

## 2018-02-09 NOTE — PROGRESS NOTES
capsule 50 mg, 50 mg, Oral, Q6H PRN, Hughie Holter, MD, 50 mg at 02/08/18 1154    haloperidol lactate (HALDOL) injection 5 mg, 5 mg, Intramuscular, Q4H PRN, Hughie Holter, MD    traZODone (DESYREL) tablet 50 mg, 50 mg, Oral, Nightly PRN, Hughie Holter, MD, 50 mg at 02/09/18 0016    benztropine mesylate (COGENTIN) injection 2 mg, 2 mg, Intramuscular, BID PRN, Hughie Holter, MD    magnesium hydroxide (MILK OF MAGNESIA) 400 MG/5ML suspension 30 mL, 30 mL, Oral, Daily PRN, Hughie Holter, MD    aluminum & magnesium hydroxide-simethicone (MAALOX) 200-200-20 MG/5ML suspension 30 mL, 30 mL, Oral, PRN, Hughie Holter, MD    pill splitter, , Does not apply, Once, Hughie Holter, MD    OXcarbazepine (TRILEPTAL) tablet 150 mg, 150 mg, Oral, BID, Hughie Holter, MD, 150 mg at 02/09/18 0857    lisinopril (PRINIVIL;ZESTRIL) tablet 20 mg, 20 mg, Oral, Daily, Soledad Amaya, CNP, 20 mg at 02/09/18 0857    ciprofloxacin (CIPRO) tablet 500 mg, 500 mg, Oral, 2 times per day, Soledad Amaya, CNP, 500 mg at 02/09/18 0857    nicotine (NICODERM CQ) 21 MG/24HR 1 patch, 1 patch, Transdermal, Daily, Hughie Holter, MD, 1 patch at 02/09/18 0856      Examination:  /73   Pulse 62   Temp 99.1 °F (37.3 °C)   Resp 18   Ht 5' 10\" (1.778 m)   Wt 150 lb (68 kg)   SpO2 100%   BMI 21.52 kg/m²   Gait - steady  Medication side effects(SE): no    Mental Status Examination:    Level of consciousness:  within normal limits   Appearance:  poor grooming and fair hygiene  Behavior/Motor:  psychomotor retardation  Attitude toward examiner:  cooperative  Speech:  slow   Mood: decreased range and depressed  Affect:  blunted  Thought processes:  linear   Thought content:  Suicidal Ideation:  passive  Cognition:  oriented to person, place, and time   Concentration poor  Insight poor   Judgement poor     ASSESSMENT:   Patient symptoms are:  [] Well controlled  [] Improving  [] Worsening  [x] No change      Diagnosis: Active Problems:    Bipolar depression ([facility] [address]  Resolved Problems:    * No resolved hospital problems. *      LABS:    Recent Labs      02/06/18 2035   WBC  7.7   HGB  13.8*   PLT  213     Recent Labs      02/06/18 2034   NA  141   K  4.4   CL  102   CO2  23   BUN  26*   CREATININE  1.64*   GLUCOSE  85     Recent Labs      02/06/18 2034   BILITOT  0.3   ALKPHOS  66   AST  30   ALT  33     Lab Results   Component Value Date    LABAMPH Neg 02/06/2018    BARBSCNU Neg 02/06/2018    LABBENZ Neg 02/06/2018    LABBENZ DTCD 09/14/2012    OPIATESCREENURINE POSITIVE 02/06/2018    PHENCYCLIDINESCREENURINE Neg 02/06/2018    ETOH <10 02/06/2018     Lab Results   Component Value Date    TSH 2.250 02/06/2018     Lab Results   Component Value Date    LITHIUM <0.1 (LL) 04/14/2014     Lab Results   Component Value Date    VALPROATE 8.3 (L) 02/06/2018       Treatment Plan:  Reviewed current Medications with the patient. Increase seroquel to 100 mg po qhs  Risks, benefits, side effects, drug-to-drug interactions and alternatives to treatment were discussed. Collateral information: pending  CD evaluation  Encourage patient to attend group and other milieu activities.   Discharge planning discussed with the patient and treatment team.    PSYCHOTHERAPY/COUNSELING:  [x] Therapeutic interview  [x] Supportive  [] CBT  [] Ongoing  [] Other    [x] Patient continues to need, on a daily basis, active treatment furnished directly by or requiring the supervision of inpatient psychiatric personnel      Anticipated Length of stay:            Electronically signed by Daralyn Boas, MD on 2/9/2018 at 11:19 AM

## 2018-02-10 PROCEDURE — 6370000000 HC RX 637 (ALT 250 FOR IP): Performed by: PSYCHIATRY & NEUROLOGY

## 2018-02-10 PROCEDURE — 6370000000 HC RX 637 (ALT 250 FOR IP): Performed by: NURSE PRACTITIONER

## 2018-02-10 PROCEDURE — 6360000002 HC RX W HCPCS: Performed by: PSYCHIATRY & NEUROLOGY

## 2018-02-10 PROCEDURE — 1240000000 HC EMOTIONAL WELLNESS R&B

## 2018-02-10 RX ORDER — LANOLIN ALCOHOL/MO/W.PET/CERES
3 CREAM (GRAM) TOPICAL NIGHTLY PRN
Status: DISCONTINUED | OUTPATIENT
Start: 2018-02-10 | End: 2018-02-13 | Stop reason: HOSPADM

## 2018-02-10 RX ADMIN — TRAZODONE HYDROCHLORIDE 50 MG: 50 TABLET ORAL at 21:28

## 2018-02-10 RX ADMIN — HYDROXYZINE PAMOATE 50 MG: 50 CAPSULE ORAL at 21:28

## 2018-02-10 RX ADMIN — ATORVASTATIN CALCIUM 80 MG: 80 TABLET, FILM COATED ORAL at 21:25

## 2018-02-10 RX ADMIN — CLOPIDOGREL BISULFATE 75 MG: 75 TABLET ORAL at 08:44

## 2018-02-10 RX ADMIN — METOPROLOL TARTRATE 12.5 MG: 25 TABLET ORAL at 21:24

## 2018-02-10 RX ADMIN — CIPROFLOXACIN HYDROCHLORIDE 500 MG: 500 TABLET, FILM COATED ORAL at 21:25

## 2018-02-10 RX ADMIN — LISINOPRIL 20 MG: 20 TABLET ORAL at 08:44

## 2018-02-10 RX ADMIN — OXCARBAZEPINE 150 MG: 150 TABLET ORAL at 08:45

## 2018-02-10 RX ADMIN — BUPRENORPHINE HYDROCHLORIDE AND NALOXONE HYDROCHLORIDE DIHYDRATE 1 TABLET: 8; 2 TABLET SUBLINGUAL at 07:58

## 2018-02-10 RX ADMIN — BUPRENORPHINE HYDROCHLORIDE AND NALOXONE HYDROCHLORIDE DIHYDRATE 1 TABLET: 8; 2 TABLET SUBLINGUAL at 21:24

## 2018-02-10 RX ADMIN — VITAMIN D, TAB 1000IU (100/BT) 2000 UNITS: 25 TAB at 08:45

## 2018-02-10 RX ADMIN — ASPIRIN 81 MG 81 MG: 81 TABLET ORAL at 08:44

## 2018-02-10 RX ADMIN — METOPROLOL TARTRATE 12.5 MG: 25 TABLET ORAL at 08:44

## 2018-02-10 RX ADMIN — OXCARBAZEPINE 150 MG: 150 TABLET ORAL at 21:25

## 2018-02-10 RX ADMIN — CIPROFLOXACIN HYDROCHLORIDE 500 MG: 500 TABLET, FILM COATED ORAL at 08:44

## 2018-02-10 RX ADMIN — QUETIAPINE FUMARATE 100 MG: 100 TABLET ORAL at 21:24

## 2018-02-10 ASSESSMENT — PAIN SCALES - GENERAL
PAINLEVEL_OUTOF10: 8
PAINLEVEL_OUTOF10: 6
PAINLEVEL_OUTOF10: 7
PAINLEVEL_OUTOF10: 4

## 2018-02-10 NOTE — PROGRESS NOTES
Pt denies HI, states that he still feels he would be ok if he did not wake up. Patient eating better and got 4 hours of sleep last evening which he states is better. Patient isolates to room sleeping most of the morning. Patient when out of room watches tv in the group room. Patient states she is feeling better today.  Electronically signed by Hemal Adair LPN on 5/71/5669 at 65:07 AM

## 2018-02-10 NOTE — PROGRESS NOTES
Pt keeps to self watching TV throughout evening shift. Pt pleasant and polite. Pt with sad affect. Denies any needs/ concerns.

## 2018-02-10 NOTE — PROGRESS NOTES
BEHAVIORAL HEALTH FOLLOW-UP NOTE     2/10/2018     Patient was seen and examined in person, Chart reviewed   Patient's case discussed with staff/team    Chief Complaint: depression    Interim History:     Patient is still depressed, and c/o only sleeping 2 hours. Appetite is \"so-so\". Still has passive suicidal ideations, to go to sleep and not wake up. He still has homicidal ideations towards his roommates (who are leaving though). Still c/o auditory hallucinations calling his name, \"yelling at him to punish him\". Denies visual hallucinations, does endorse paranoia. Appetite:   [x] Normal/Unchanged  [] Increased  [] Decreased      Sleep:       [] Normal/Unchanged  [x] Fair       [] Poor              Energy:    [] Normal/Unchanged  [] Increased  [x] Decreased        SI [x] Present, passive  [] Absent    HI  [x]Present  [] Absent     Aggression:  [] yes  [x] no    Patient is [] able  [x] unable to CONTRACT FOR SAFETY     PAST MEDICAL/PSYCHIATRIC HISTORY:   Past Medical History:   Diagnosis Date    Abnormality of gait and mobility     Alcohol abuse 2014    Anxiety     Ataxia     CAD (coronary artery disease)     CHF (congestive heart failure) (HCC)     Chronic back pain     Cocaine abuse 2014    Depression     Heroin abuse 2014    History of hepatitis C     Hyperlipidemia     Hypertension     Late effects of CVA (cerebrovascular accident)     Leukocytosis     Myocardial infarct, old 2010    S/P CABG x 3     Tobacco abuse 2014    Transient ischemic attack        FAMILY/SOCIAL HISTORY:  Family History   Problem Relation Age of Onset    Family history unknown: Yes     Social History     Social History    Marital status:      Spouse name: N/A    Number of children: 1    Years of education: 15     Occupational History    Not on file.      Social History Main Topics    Smoking status: Current Every Day Smoker     Packs/day: 2.00     Years: 45.00     Types: Cigarettes    Smokeless tobacco:

## 2018-02-11 PROCEDURE — 1240000000 HC EMOTIONAL WELLNESS R&B

## 2018-02-11 PROCEDURE — 6360000002 HC RX W HCPCS: Performed by: PSYCHIATRY & NEUROLOGY

## 2018-02-11 PROCEDURE — 6370000000 HC RX 637 (ALT 250 FOR IP): Performed by: NURSE PRACTITIONER

## 2018-02-11 PROCEDURE — 6370000000 HC RX 637 (ALT 250 FOR IP): Performed by: PSYCHIATRY & NEUROLOGY

## 2018-02-11 RX ADMIN — QUETIAPINE FUMARATE 100 MG: 100 TABLET ORAL at 20:50

## 2018-02-11 RX ADMIN — HYDROXYZINE PAMOATE 50 MG: 50 CAPSULE ORAL at 20:50

## 2018-02-11 RX ADMIN — METOPROLOL TARTRATE 12.5 MG: 25 TABLET ORAL at 20:49

## 2018-02-11 RX ADMIN — BUPRENORPHINE HYDROCHLORIDE AND NALOXONE HYDROCHLORIDE DIHYDRATE 1 TABLET: 8; 2 TABLET SUBLINGUAL at 20:50

## 2018-02-11 RX ADMIN — BUPRENORPHINE HYDROCHLORIDE AND NALOXONE HYDROCHLORIDE DIHYDRATE 1 TABLET: 8; 2 TABLET SUBLINGUAL at 08:13

## 2018-02-11 RX ADMIN — HYDROXYZINE PAMOATE 50 MG: 50 CAPSULE ORAL at 14:23

## 2018-02-11 RX ADMIN — ASPIRIN 81 MG 81 MG: 81 TABLET ORAL at 09:13

## 2018-02-11 RX ADMIN — CLOPIDOGREL BISULFATE 75 MG: 75 TABLET ORAL at 09:13

## 2018-02-11 RX ADMIN — ATORVASTATIN CALCIUM 80 MG: 80 TABLET, FILM COATED ORAL at 20:49

## 2018-02-11 RX ADMIN — METOPROLOL TARTRATE 12.5 MG: 25 TABLET ORAL at 09:13

## 2018-02-11 RX ADMIN — OXCARBAZEPINE 150 MG: 150 TABLET ORAL at 09:13

## 2018-02-11 RX ADMIN — LISINOPRIL 20 MG: 20 TABLET ORAL at 09:13

## 2018-02-11 RX ADMIN — OXCARBAZEPINE 150 MG: 150 TABLET ORAL at 20:50

## 2018-02-11 RX ADMIN — CIPROFLOXACIN HYDROCHLORIDE 500 MG: 500 TABLET, FILM COATED ORAL at 09:13

## 2018-02-11 RX ADMIN — TRAZODONE HYDROCHLORIDE 50 MG: 50 TABLET ORAL at 20:50

## 2018-02-11 RX ADMIN — CIPROFLOXACIN HYDROCHLORIDE 500 MG: 500 TABLET, FILM COATED ORAL at 20:50

## 2018-02-11 RX ADMIN — VITAMIN D, TAB 1000IU (100/BT) 2000 UNITS: 25 TAB at 09:13

## 2018-02-11 ASSESSMENT — PAIN SCALES - GENERAL
PAINLEVEL_OUTOF10: 8
PAINLEVEL_OUTOF10: 6
PAINLEVEL_OUTOF10: 4

## 2018-02-11 NOTE — PROGRESS NOTES
Pt denies  Current SI/HI or AVH. Patient does continue with a passive death wish without a plan. Patient is dealing with landlord to get his roommate(s) removed before he goes home to avoid a confrontation. Patient states he slept better last night. Appetite is better. Patient is up not social with other peers. Patient watches tv in the group room. Pt is not seen going to groups.   Electronically signed by Randall Harrington LPN on 4/15/3110 at 91:10 AM

## 2018-02-11 NOTE — PROGRESS NOTES
Pt. refused to attend the 0900 community meeting due to resting in room, despite staff encouragement. Electronically signed by Leighton Mart[address]nez [address] on 2/11/2018 at 1:58 PM

## 2018-02-12 PROCEDURE — 6360000002 HC RX W HCPCS: Performed by: PSYCHIATRY & NEUROLOGY

## 2018-02-12 PROCEDURE — 1240000000 HC EMOTIONAL WELLNESS R&B

## 2018-02-12 PROCEDURE — 6370000000 HC RX 637 (ALT 250 FOR IP): Performed by: NURSE PRACTITIONER

## 2018-02-12 PROCEDURE — 99232 SBSQ HOSP IP/OBS MODERATE 35: CPT | Performed by: PSYCHIATRY & NEUROLOGY

## 2018-02-12 PROCEDURE — 6370000000 HC RX 637 (ALT 250 FOR IP): Performed by: PSYCHIATRY & NEUROLOGY

## 2018-02-12 RX ORDER — OXCARBAZEPINE 300 MG/1
300 TABLET, FILM COATED ORAL 2 TIMES DAILY
Status: DISCONTINUED | OUTPATIENT
Start: 2018-02-12 | End: 2018-02-13 | Stop reason: HOSPADM

## 2018-02-12 RX ADMIN — METOPROLOL TARTRATE 12.5 MG: 25 TABLET ORAL at 08:40

## 2018-02-12 RX ADMIN — OXCARBAZEPINE 300 MG: 300 TABLET, FILM COATED ORAL at 21:12

## 2018-02-12 RX ADMIN — METOPROLOL TARTRATE 12.5 MG: 25 TABLET ORAL at 20:56

## 2018-02-12 RX ADMIN — VITAMIN D, TAB 1000IU (100/BT) 2000 UNITS: 25 TAB at 08:40

## 2018-02-12 RX ADMIN — HYDROXYZINE PAMOATE 50 MG: 50 CAPSULE ORAL at 08:46

## 2018-02-12 RX ADMIN — ASPIRIN 81 MG 81 MG: 81 TABLET ORAL at 08:40

## 2018-02-12 RX ADMIN — OXCARBAZEPINE 150 MG: 150 TABLET ORAL at 08:39

## 2018-02-12 RX ADMIN — ATORVASTATIN CALCIUM 80 MG: 80 TABLET, FILM COATED ORAL at 20:54

## 2018-02-12 RX ADMIN — QUETIAPINE FUMARATE 100 MG: 100 TABLET ORAL at 20:54

## 2018-02-12 RX ADMIN — TRAZODONE HYDROCHLORIDE 50 MG: 50 TABLET ORAL at 20:54

## 2018-02-12 RX ADMIN — LISINOPRIL 20 MG: 20 TABLET ORAL at 08:39

## 2018-02-12 RX ADMIN — CIPROFLOXACIN HYDROCHLORIDE 500 MG: 500 TABLET, FILM COATED ORAL at 08:39

## 2018-02-12 RX ADMIN — HYDROXYZINE PAMOATE 50 MG: 50 CAPSULE ORAL at 17:32

## 2018-02-12 RX ADMIN — CLOPIDOGREL BISULFATE 75 MG: 75 TABLET ORAL at 08:39

## 2018-02-12 RX ADMIN — BUPRENORPHINE HYDROCHLORIDE AND NALOXONE HYDROCHLORIDE DIHYDRATE 1 TABLET: 8; 2 TABLET SUBLINGUAL at 08:40

## 2018-02-12 RX ADMIN — BUPRENORPHINE HYDROCHLORIDE AND NALOXONE HYDROCHLORIDE DIHYDRATE 1 TABLET: 8; 2 TABLET SUBLINGUAL at 20:54

## 2018-02-12 ASSESSMENT — PAIN SCALES - GENERAL: PAINLEVEL_OUTOF10: 8

## 2018-02-12 NOTE — PROGRESS NOTES
Group Therapy Note    Date: 2/12/2018  Start [id number]  End Time:  1716    Number of Participants: 7    Type of Group: Psychoeducation    Patient's Goal:  To participate in mood management group. Notes:  Patient learned about wellness and emotional minds. Status After Intervention:  Unchanged    Participation Level: Active Listener    Participation Quality: Appropriate      Speech:  normal      Thought Process/Content: Logical      Affective Functioning: Congruent      Mood: depressed      Level of consciousness:  Alert      Response to Learning: Able to verbalize current knowledge/experience      Endings: None Reported    Modes of Intervention: Education      Discipline Responsible: /Counselor      Signature:   ALYCIA Balbuena

## 2018-02-12 NOTE — PROGRESS NOTES
Group Therapy Note    Date: 2/12/2018  Start Time: 1100  End Time:  6304  Number of Participants: 5    Type of Group: Psychoeducation    Wellness Binder Information  Module Name:    Session Number:      Patient's Goal:  \"to feel better\"    Notes:  Pt. attended the skill group. Pt. was quiet and to himself. Concentrating on writing a letter during group time. Relaxed and calm. Feels better. Status After Intervention:  Improved    Participation Level:  Active Listener and Minimal    Participation Quality: Appropriate, Attentive and Sharing      Speech:  normal      Thought Process/Content: Logical      Affective Functioning: Congruent      Mood: calm      Level of consciousness:  Alert, Oriented x4 and Attentive      Response to Learning: Able to verbalize current knowledge/experience and Progressing to goal      Endings: None Reported    Modes of Intervention: Education, Support, Socialization and Activity      Discipline Responsible: Psychoeducational Specialist      Signature:  Minal Gonzalez

## 2018-02-12 NOTE — PROGRESS NOTES
Dre Andrews MD, 650 mg at 02/09/18 1806    hydrOXYzine (VISTARIL) capsule 50 mg, 50 mg, Oral, Q6H PRN, Dre Andrews MD, 50 mg at 02/11/18 1423    haloperidol lactate (HALDOL) injection 5 mg, 5 mg, Intramuscular, Q4H PRN, Dre Andrews MD    traZODone (DESYREL) tablet 50 mg, 50 mg, Oral, Nightly PRN, Dre Andrews MD, 50 mg at 02/10/18 2128    benztropine mesylate (COGENTIN) injection 2 mg, 2 mg, Intramuscular, BID PRN, Dre Andrews MD    magnesium hydroxide (MILK OF MAGNESIA) 400 MG/5ML suspension 30 mL, 30 mL, Oral, Daily PRN, Dre Andrews MD    aluminum & magnesium hydroxide-simethicone (MAALOX) 200-200-20 MG/5ML suspension 30 mL, 30 mL, Oral, PRN, Dre Andrews MD    pill splitter, , Does not apply, Once, Dre Andrews MD    OXcarbazepine (TRILEPTAL) tablet 150 mg, 150 mg, Oral, BID, Dre Andrews MD, 150 mg at 02/11/18 0913    lisinopril (PRINIVIL;ZESTRIL) tablet 20 mg, 20 mg, Oral, Daily, [place] Lalita, CNP, 20 mg at 02/11/18 0913    ciprofloxacin (CIPRO) tablet 500 mg, 500 mg, Oral, 2 times per day, [place] Lalita, CNP, 500 mg at 02/11/18 0913    nicotine (NICODERM CQ) 21 MG/24HR 1 patch, 1 patch, Transdermal, Daily, Dre Andrews MD, 1 patch at 02/11/18 0915      Examination:  /83   Pulse 60   Temp 98 °F (36.7 °C) (Oral)   Resp 18   Ht 5' 10\" (1.778 m)   Wt 150 lb (68 kg)   SpO2 99%   BMI 21.52 kg/m²   Gait - steady  Medication side effects(SE): no    Mental Status Examination:    Level of consciousness:  within normal limits   Appearance:  poor grooming and fair hygiene  Behavior/Motor:  psychomotor retardation  Attitude toward examiner:  cooperative  Speech:  slow   Mood: decreased range and depressed  Affect:  blunted  Thought processes:  linear   Thought content:  Suicidal Ideation:  Passive; homicidal ideations: positive; paranoia diminished  Cognition:  oriented to person, place, and time   Concentration poor  Insight poor Judgement poor     ASSESSMENT:   Patient symptoms are:  [] Well controlled  [x] Improving  [] Worsening  [] No change      Diagnosis:   Active Problems:    Bipolar depression ([address]  Resolved Problems:    * No resolved hospital problems. *      LABS:    No results for input(s): WBC, HGB, PLT in the last 72 hours. No results for input(s): NA, K, CL, CO2, BUN, CREATININE, GLUCOSE in the last 72 hours. No results for input(s): BILITOT, ALKPHOS, AST, ALT in the last 72 hours. Lab Results   Component Value Date    LABAMPH Neg 02/06/2018    BARBSCNU Neg 02/06/2018    LABBENZ Neg 02/06/2018    LABBENZ DTCD 09/14/2012    OPIATESCREENURINE POSITIVE 02/06/2018    PHENCYCLIDINESCREENURINE Neg 02/06/2018    ETOH <10 02/06/2018     Lab Results   Component Value Date    TSH 2.250 02/06/2018     Lab Results   Component Value Date    LITHIUM <0.1 (LL) 04/14/2014     Lab Results   Component Value Date    VALPROATE 8.3 (L) 02/06/2018       Treatment Plan:  Reviewed current Medications with the patient. Risks, benefits, side effects, drug-to-drug interactions and alternatives to treatment were discussed. Collateral information: pending  CD evaluation  Encourage patient to attend group and other milieu activities.   Discharge planning discussed with the patient and treatment team.    PSYCHOTHERAPY/COUNSELING:  [x] Therapeutic interview  [x] Supportive  [] CBT  [] Ongoing  [] Other    [x] Patient continues to need, on a daily basis, active treatment furnished directly by or requiring the supervision of inpatient psychiatric personnel                  Electronically signed by Maia Amaya MD on 2/11/2018 at 8:26 PM

## 2018-02-12 NOTE — CARE COORDINATION
Group Therapy Note    Date: 2/12/2018  Start Time: 10:00am  End Time:  10:45am  Number of Participants: 5    Type of Group: Psychotherapy    Wellness Binder Information  Module Name:  x  Session Number:  x    Patient's Goal:  I am sleeping better    Notes:  Patient was engaged in group and reports that he is sleeping better and that he will be able to tell doctor. Status After Intervention:  Improved    Participation Level: Interactive    Participation Quality: Appropriate      Speech:  normal      Thought Process/Content: Logical      Affective Functioning: Congruent      Mood: euphoric      Level of consciousness:  Alert      Response to Learning: Able to verbalize current knowledge/experience      Endings: None Reported    Modes of Intervention: Support      Discipline Responsible: /Counselor     Electronically signed by ARABELLA Stephens LSW on 2/12/2018 at 10:51 AM      Signature:   ARABELLA Stephens LSW

## 2018-02-12 NOTE — PROGRESS NOTES
Pt. declined to attend the 0900 community meeting, despite staff encouragement.  Electronically signed by Albino Persaud on 2/12/2018 at 9:34 AM

## 2018-02-12 NOTE — BH NOTE
Pt attended and participated in Recreation group at 0.     Electronically signed by Devonte Nino on 2/11/2018 at 10:57 PM

## 2018-02-13 VITALS
TEMPERATURE: 97 F | SYSTOLIC BLOOD PRESSURE: 117 MMHG | BODY MASS INDEX: 21.47 KG/M2 | WEIGHT: 150 LBS | RESPIRATION RATE: 18 BRPM | HEIGHT: 70 IN | OXYGEN SATURATION: 99 % | HEART RATE: 62 BPM | DIASTOLIC BLOOD PRESSURE: 64 MMHG

## 2018-02-13 PROCEDURE — 6360000002 HC RX W HCPCS: Performed by: PSYCHIATRY & NEUROLOGY

## 2018-02-13 PROCEDURE — 6370000000 HC RX 637 (ALT 250 FOR IP): Performed by: PSYCHIATRY & NEUROLOGY

## 2018-02-13 PROCEDURE — 99239 HOSP IP/OBS DSCHRG MGMT >30: CPT | Performed by: PSYCHIATRY & NEUROLOGY

## 2018-02-13 PROCEDURE — 6370000000 HC RX 637 (ALT 250 FOR IP): Performed by: NURSE PRACTITIONER

## 2018-02-13 RX ORDER — TRAZODONE HYDROCHLORIDE 50 MG/1
50 TABLET ORAL NIGHTLY PRN
Qty: 15 TABLET | Refills: 2 | Status: ON HOLD | OUTPATIENT
Start: 2018-02-13 | End: 2018-12-21

## 2018-02-13 RX ORDER — QUETIAPINE FUMARATE 100 MG/1
100 TABLET, FILM COATED ORAL NIGHTLY
Qty: 15 TABLET | Refills: 2 | Status: ON HOLD | OUTPATIENT
Start: 2018-02-13 | End: 2018-12-24 | Stop reason: HOSPADM

## 2018-02-13 RX ORDER — OXCARBAZEPINE 300 MG/1
300 TABLET, FILM COATED ORAL 2 TIMES DAILY
Qty: 30 TABLET | Refills: 2 | Status: SHIPPED | OUTPATIENT
Start: 2018-02-13

## 2018-02-13 RX ADMIN — VITAMIN D, TAB 1000IU (100/BT) 2000 UNITS: 25 TAB at 08:58

## 2018-02-13 RX ADMIN — CLOPIDOGREL BISULFATE 75 MG: 75 TABLET ORAL at 08:58

## 2018-02-13 RX ADMIN — ASPIRIN 81 MG 81 MG: 81 TABLET ORAL at 08:58

## 2018-02-13 RX ADMIN — BUPRENORPHINE HYDROCHLORIDE AND NALOXONE HYDROCHLORIDE DIHYDRATE 1 TABLET: 8; 2 TABLET SUBLINGUAL at 08:06

## 2018-02-13 RX ADMIN — LISINOPRIL 20 MG: 20 TABLET ORAL at 08:58

## 2018-02-13 RX ADMIN — OXCARBAZEPINE 300 MG: 300 TABLET, FILM COATED ORAL at 09:27

## 2018-02-13 RX ADMIN — METOPROLOL TARTRATE 12.5 MG: 25 TABLET ORAL at 08:58

## 2018-02-13 RX ADMIN — HYDROXYZINE PAMOATE 50 MG: 50 CAPSULE ORAL at 13:54

## 2018-02-13 ASSESSMENT — PAIN SCALES - GENERAL
PAINLEVEL_OUTOF10: 6
PAINLEVEL_OUTOF10: 8
PAINLEVEL_OUTOF10: 8

## 2018-02-13 ASSESSMENT — PAIN DESCRIPTION - PAIN TYPE: TYPE: CHRONIC PAIN

## 2018-02-13 ASSESSMENT — PAIN DESCRIPTION - LOCATION: LOCATION: HIP

## 2018-02-13 NOTE — DISCHARGE SUMMARY
81 mg, Oral, Daily, Destiny Sellers MD, 81 mg at 02/13/18 0858    atorvastatin (LIPITOR) tablet 80 mg, 80 mg, Oral, Nightly, Destiny Sellers MD, 80 mg at 02/12/18 2054    buprenorphine-naloxone (SUBOXONE) 8-2 MG SL tablet 1 tablet, 1 tablet, Sublingual, BID, Destiny Sellers MD, 1 tablet at 02/13/18 0806    vitamin D (CHOLECALCIFEROL) tablet 2,000 Units, 2,000 Units, Oral, Daily, Destiny Sellers MD, 2,000 Units at 02/13/18 0858    clopidogrel (PLAVIX) tablet 75 mg, 75 mg, Oral, Daily, Destiny Sellers MD, 75 mg at 02/13/18 0858    metoprolol tartrate (LOPRESSOR) tablet 12.5 mg, 12.5 mg, Oral, BID, Destiny Sellers MD, 12.5 mg at 02/13/18 0858    acetaminophen (TYLENOL) tablet 650 mg, 650 mg, Oral, Q4H PRN, Destiny Sellers MD, 650 mg at 02/09/18 1806    hydrOXYzine (VISTARIL) capsule 50 mg, 50 mg, Oral, Q6H PRN, Destiny Sellers MD, 50 mg at 02/13/18 1354    haloperidol lactate (HALDOL) injection 5 mg, 5 mg, Intramuscular, Q4H PRN, Destiny Sellers MD    traZODone (DESYREL) tablet 50 mg, 50 mg, Oral, Nightly PRN, Destiny Sellers MD, 50 mg at 02/12/18 2054    benztropine mesylate (COGENTIN) injection 2 mg, 2 mg, Intramuscular, BID PRN, Destiny Sellers MD    magnesium hydroxide (MILK OF MAGNESIA) 400 MG/5ML suspension 30 mL, 30 mL, Oral, Daily PRN, Destiny Sellers MD    aluminum & magnesium hydroxide-simethicone (MAALOX) 200-200-20 MG/5ML suspension 30 mL, 30 mL, Oral, PRN, Destiny Sellers MD    pill splitter, , Does not apply, Once, Destiny Sellers MD    lisinopril (PRINIVIL;ZESTRIL) tablet 20 mg, 20 mg, Oral, Daily, Larwence Papa, CNP, 20 mg at 02/13/18 0858    nicotine (NICODERM CQ) 21 MG/24HR 1 patch, 1 patch, Transdermal, Daily, Destiny Sellers MD, 1 patch at 02/13/18 0900    Examination:  /64   Pulse 62   Temp 97 °F (36.1 °C) (Oral)   Resp 18   Ht 5' 10\" (1.778 m)   Wt 150 lb (68 kg)   SpO2 99%   BMI 21.52 kg/m²   Gait - steady    HOSPITAL COURSE[de-identified]  Following admission to the hospital, patient had a complete physical exam and blood work up  Patient was monitored closely with suicide precaution  Patient was started on meds as above  Was encouraged to participate in group and other milieu activity  Patient started to feel better with this combination of treatment. Significant progress in the symptoms since admission. Mood better, with the score of 2/10 - bad  No AVH or paranoid thoughts  No Hopeless or worthless feeling  No active SI/HI  Appetite:  [x] Normal  [] Increased  [] Decreased    Sleep:       [x] Normal  [] Fair       [] Poor            Energy:    [x] Normal  [] Increased  [] Decreased     SI [] Present  [x] Absent  HI  []Present  [x] Absent   Aggression:  [] yes  [] no  Patient is [x] able  [] unable to CONTRACT FOR SAFETY   Medication side effects(SE):  [x] None(Psych. Meds.) [] Other      Mental Status Examination on discharge:    Level of consciousness:  within normal limits   Appearance:  well-appearing  Behavior/Motor:  no abnormalities noted  Attitude toward examiner:  attentive and good eye contact  Speech:  spontaneous, normal rate and normal volume   Mood: anxious  Affect:  mood congruent  Thought processes:  linear and goal directed   Thought content:  Suicidal Ideation:  denies suicidal ideation  Delusions:  no evidence of delusions  Perceptual Disturbance:  denies any perceptual disturbance  Cognition:  oriented to person, place, and time   Concentration intact  Memory intact  Insight good   Judgement fair   Fund of Knowledge adequate      ASSESSMENT:  Patient symptoms are:  [] Well controlled  [x] Improving  [] Worsening  [] No change      Diagnosis:  Active Problems:    Bipolar depression ([facility] [address]  Resolved Problems:    * No resolved hospital problems. *      LABS:    No results for input(s): WBC, HGB, PLT in the last 72 hours. No results for input(s): NA, K, CL, CO2, BUN, CREATININE, GLUCOSE in the last 72 hours.   No results for input(s):

## 2018-02-13 NOTE — PROGRESS NOTES
Pt asks for Vistaril as he is anxious about not going home until tomorrow as he needs to report to work tomorrow and is worried about losing his job. BLOSSOM Louise is notified of this.

## 2018-03-16 ENCOUNTER — HOSPITAL ENCOUNTER (OUTPATIENT)
Dept: PREADMISSION TESTING | Age: 58
Discharge: HOME | End: 2018-03-16
Payer: COMMERCIAL

## 2018-05-21 ENCOUNTER — HOSPITAL ENCOUNTER (EMERGENCY)
Age: 58
Discharge: HOME | End: 2018-05-21
Attending: EMERGENCY MEDICINE
Payer: COMMERCIAL

## 2018-05-21 VITALS
HEART RATE: 84 BPM | HEIGHT: 70 IN | WEIGHT: 170 LBS | SYSTOLIC BLOOD PRESSURE: 118 MMHG | TEMPERATURE: 97.6 F | OXYGEN SATURATION: 97 % | RESPIRATION RATE: 18 BRPM | BODY MASS INDEX: 24.34 KG/M2 | DIASTOLIC BLOOD PRESSURE: 88 MMHG

## 2018-05-21 DIAGNOSIS — R10.31 GROIN PAIN, CHRONIC, RIGHT: Primary | ICD-10-CM

## 2018-05-21 DIAGNOSIS — G89.29 GROIN PAIN, CHRONIC, RIGHT: Primary | ICD-10-CM

## 2018-05-21 PROCEDURE — 6360000002 HC RX W HCPCS: Performed by: EMERGENCY MEDICINE

## 2018-05-21 PROCEDURE — 99283 EMERGENCY DEPT VISIT LOW MDM: CPT

## 2018-05-21 RX ORDER — NALOXONE HYDROCHLORIDE 1 MG/ML
2 INJECTION INTRAMUSCULAR; INTRAVENOUS; SUBCUTANEOUS ONCE
Status: COMPLETED | OUTPATIENT
Start: 2018-05-21 | End: 2018-05-21

## 2018-05-21 RX ADMIN — NALOXONE HYDROCHLORIDE 2 MG: 1 INJECTION PARENTERAL at 12:25

## 2018-05-21 ASSESSMENT — PAIN SCALES - GENERAL: PAINLEVEL_OUTOF10: 6

## 2018-05-21 ASSESSMENT — PAIN DESCRIPTION - FREQUENCY: FREQUENCY: CONTINUOUS

## 2018-05-21 ASSESSMENT — PAIN - FUNCTIONAL ASSESSMENT: PAIN_FUNCTIONAL_ASSESSMENT: 0-10

## 2018-05-21 ASSESSMENT — PAIN DESCRIPTION - PAIN TYPE: TYPE: CHRONIC PAIN

## 2018-05-21 ASSESSMENT — PAIN DESCRIPTION - LOCATION: LOCATION: GROIN

## 2018-05-21 ASSESSMENT — PAIN DESCRIPTION - DESCRIPTORS: DESCRIPTORS: ACHING

## 2018-05-23 ASSESSMENT — ENCOUNTER SYMPTOMS
DIARRHEA: 0
ABDOMINAL PAIN: 0
VOMITING: 0
SHORTNESS OF BREATH: 0
FACIAL SWELLING: 0
COLOR CHANGE: 0
RHINORRHEA: 0
EYE DISCHARGE: 0
CONSTIPATION: 0

## 2018-06-15 ENCOUNTER — APPOINTMENT (OUTPATIENT)
Dept: GENERAL RADIOLOGY | Age: 58
End: 2018-06-15
Payer: COMMERCIAL

## 2018-06-15 ENCOUNTER — HOSPITAL ENCOUNTER (EMERGENCY)
Age: 58
Discharge: PSYCHIATRIC OR SUBSTANCE USE FACILITY | End: 2018-06-15
Attending: EMERGENCY MEDICINE
Payer: COMMERCIAL

## 2018-06-15 ENCOUNTER — APPOINTMENT (OUTPATIENT)
Dept: CT IMAGING | Age: 58
End: 2018-06-15
Payer: COMMERCIAL

## 2018-06-15 VITALS
WEIGHT: 160 LBS | TEMPERATURE: 98.4 F | SYSTOLIC BLOOD PRESSURE: 148 MMHG | HEART RATE: 73 BPM | BODY MASS INDEX: 23.7 KG/M2 | OXYGEN SATURATION: 97 % | DIASTOLIC BLOOD PRESSURE: 73 MMHG | HEIGHT: 69 IN | RESPIRATION RATE: 18 BRPM

## 2018-06-15 DIAGNOSIS — F32.1 MODERATE SINGLE CURRENT EPISODE OF MAJOR DEPRESSIVE DISORDER (HCC): ICD-10-CM

## 2018-06-15 DIAGNOSIS — N30.00 ACUTE CYSTITIS WITHOUT HEMATURIA: ICD-10-CM

## 2018-06-15 DIAGNOSIS — R45.851 SUICIDAL IDEATION: Primary | ICD-10-CM

## 2018-06-15 LAB
ALBUMIN SERPL-MCNC: 4.2 G/DL (ref 3.9–4.9)
ALP BLD-CCNC: 72 U/L (ref 35–104)
ALT SERPL-CCNC: 56 U/L (ref 0–41)
AMPHETAMINE SCREEN, URINE: ABNORMAL
ANION GAP SERPL CALCULATED.3IONS-SCNC: 15 MEQ/L (ref 7–13)
AST SERPL-CCNC: 50 U/L (ref 0–40)
BACTERIA: ABNORMAL /HPF
BARBITURATE SCREEN URINE: ABNORMAL
BASOPHILS ABSOLUTE: 0 K/UL (ref 0–0.2)
BASOPHILS RELATIVE PERCENT: 0.4 %
BENZODIAZEPINE SCREEN, URINE: ABNORMAL
BILIRUB SERPL-MCNC: 0.4 MG/DL (ref 0–1.2)
BILIRUBIN URINE: NEGATIVE
BLOOD, URINE: NEGATIVE
BUN BLDV-MCNC: 28 MG/DL (ref 6–20)
CALCIUM SERPL-MCNC: 9 MG/DL (ref 8.6–10.2)
CANNABINOID SCREEN URINE: ABNORMAL
CASTS: ABNORMAL /LPF
CHLORIDE BLD-SCNC: 102 MEQ/L (ref 98–107)
CK MB: 5.1 NG/ML (ref 0–6.7)
CLARITY: CLEAR
CO2: 25 MEQ/L (ref 22–29)
COCAINE METABOLITE SCREEN URINE: POSITIVE
COLOR: YELLOW
CREAT SERPL-MCNC: 1.38 MG/DL (ref 0.7–1.2)
CREATINE KINASE-MB INDEX: 2.4 % (ref 0–3.5)
EKG ATRIAL RATE: 68 BPM
EKG P AXIS: 71 DEGREES
EKG P-R INTERVAL: 140 MS
EKG Q-T INTERVAL: 484 MS
EKG QRS DURATION: 144 MS
EKG QTC CALCULATION (BAZETT): 514 MS
EKG R AXIS: 76 DEGREES
EKG T AXIS: 9 DEGREES
EKG VENTRICULAR RATE: 68 BPM
EOSINOPHILS ABSOLUTE: 0.1 K/UL (ref 0–0.7)
EOSINOPHILS RELATIVE PERCENT: 1 %
EPITHELIAL CELLS, UA: ABNORMAL /HPF
ETHANOL PERCENT: NORMAL G/DL
ETHANOL: <10 MG/DL (ref 0–0.08)
GFR AFRICAN AMERICAN: 58
GFR AFRICAN AMERICAN: >60
GFR NON-AFRICAN AMERICAN: 48
GFR NON-AFRICAN AMERICAN: 52.7
GLOBULIN: 3.3 G/DL (ref 2.3–3.5)
GLUCOSE BLD-MCNC: 85 MG/DL (ref 74–109)
GLUCOSE URINE: NEGATIVE MG/DL
HCT VFR BLD CALC: 40.9 % (ref 42–52)
HEMOGLOBIN: 14 G/DL (ref 14–18)
KETONES, URINE: ABNORMAL MG/DL
LACTIC ACID: 0.7 MMOL/L (ref 0.5–2.2)
LEUKOCYTE ESTERASE, URINE: ABNORMAL
LIPASE: 15 U/L (ref 13–60)
LYMPHOCYTES ABSOLUTE: 1.7 K/UL (ref 1–4.8)
LYMPHOCYTES RELATIVE PERCENT: 18.2 %
Lab: ABNORMAL
MCH RBC QN AUTO: 31.7 PG (ref 27–31.3)
MCHC RBC AUTO-ENTMCNC: 34.4 % (ref 33–37)
MCV RBC AUTO: 92.3 FL (ref 80–100)
MONOCYTES ABSOLUTE: 0.8 K/UL (ref 0.2–0.8)
MONOCYTES RELATIVE PERCENT: 8.8 %
NEUTROPHILS ABSOLUTE: 6.8 K/UL (ref 1.4–6.5)
NEUTROPHILS RELATIVE PERCENT: 71.6 %
NITRITE, URINE: NEGATIVE
OPIATE SCREEN URINE: POSITIVE
PDW BLD-RTO: 14.2 % (ref 11.5–14.5)
PERFORMED ON: ABNORMAL
PH UA: 5.5 (ref 5–9)
PHENCYCLIDINE SCREEN URINE: ABNORMAL
PLATELET # BLD: 199 K/UL (ref 130–400)
POC CREATININE WHOLE BLOOD: 1.5
POC CREATININE: 1.5 MG/DL (ref 0.9–1.3)
POC SAMPLE TYPE: ABNORMAL
POTASSIUM SERPL-SCNC: 4 MEQ/L (ref 3.5–5.1)
PROTEIN UA: ABNORMAL MG/DL
RBC # BLD: 4.43 M/UL (ref 4.7–6.1)
RBC UA: ABNORMAL /HPF (ref 0–2)
RENAL EPITHELIAL, UA: ABNORMAL /HPF
SODIUM BLD-SCNC: 142 MEQ/L (ref 132–144)
SPECIFIC GRAVITY UA: 1.02 (ref 1–1.03)
TOTAL CK: 216 U/L (ref 0–190)
TOTAL PROTEIN: 7.5 G/DL (ref 6.4–8.1)
TROPONIN: <0.01 NG/ML (ref 0–0.01)
TSH SERPL DL<=0.05 MIU/L-ACNC: 3.67 UIU/ML (ref 0.27–4.2)
URINE REFLEX TO CULTURE: YES
UROBILINOGEN, URINE: 1 E.U./DL
WBC # BLD: 9.5 K/UL (ref 4.8–10.8)
WBC UA: ABNORMAL /HPF (ref 0–5)

## 2018-06-15 PROCEDURE — 2580000003 HC RX 258: Performed by: NURSE PRACTITIONER

## 2018-06-15 PROCEDURE — 84443 ASSAY THYROID STIM HORMONE: CPT

## 2018-06-15 PROCEDURE — 99285 EMERGENCY DEPT VISIT HI MDM: CPT

## 2018-06-15 PROCEDURE — 80307 DRUG TEST PRSMV CHEM ANLYZR: CPT

## 2018-06-15 PROCEDURE — 6360000002 HC RX W HCPCS: Performed by: NURSE PRACTITIONER

## 2018-06-15 PROCEDURE — 87086 URINE CULTURE/COLONY COUNT: CPT

## 2018-06-15 PROCEDURE — 83690 ASSAY OF LIPASE: CPT

## 2018-06-15 PROCEDURE — 6370000000 HC RX 637 (ALT 250 FOR IP): Performed by: EMERGENCY MEDICINE

## 2018-06-15 PROCEDURE — 85025 COMPLETE CBC W/AUTO DIFF WBC: CPT

## 2018-06-15 PROCEDURE — 6360000004 HC RX CONTRAST MEDICATION: Performed by: NURSE PRACTITIONER

## 2018-06-15 PROCEDURE — 83605 ASSAY OF LACTIC ACID: CPT

## 2018-06-15 PROCEDURE — 36415 COLL VENOUS BLD VENIPUNCTURE: CPT

## 2018-06-15 PROCEDURE — 81001 URINALYSIS AUTO W/SCOPE: CPT

## 2018-06-15 PROCEDURE — 96374 THER/PROPH/DIAG INJ IV PUSH: CPT

## 2018-06-15 PROCEDURE — 71045 X-RAY EXAM CHEST 1 VIEW: CPT

## 2018-06-15 PROCEDURE — 84484 ASSAY OF TROPONIN QUANT: CPT

## 2018-06-15 PROCEDURE — G0480 DRUG TEST DEF 1-7 CLASSES: HCPCS

## 2018-06-15 PROCEDURE — 93005 ELECTROCARDIOGRAM TRACING: CPT

## 2018-06-15 PROCEDURE — 80053 COMPREHEN METABOLIC PANEL: CPT

## 2018-06-15 PROCEDURE — 93010 ELECTROCARDIOGRAM REPORT: CPT | Performed by: INTERNAL MEDICINE

## 2018-06-15 PROCEDURE — 74177 CT ABD & PELVIS W/CONTRAST: CPT

## 2018-06-15 PROCEDURE — 82550 ASSAY OF CK (CPK): CPT

## 2018-06-15 PROCEDURE — 82553 CREATINE MB FRACTION: CPT

## 2018-06-15 RX ORDER — ONDANSETRON 2 MG/ML
4 INJECTION INTRAMUSCULAR; INTRAVENOUS ONCE
Status: COMPLETED | OUTPATIENT
Start: 2018-06-15 | End: 2018-06-15

## 2018-06-15 RX ORDER — CIPROFLOXACIN 500 MG/1
500 TABLET, FILM COATED ORAL ONCE
Status: COMPLETED | OUTPATIENT
Start: 2018-06-15 | End: 2018-06-15

## 2018-06-15 RX ORDER — HYDROXYZINE PAMOATE 50 MG/1
50 CAPSULE ORAL 2 TIMES DAILY
COMMUNITY

## 2018-06-15 RX ORDER — CITALOPRAM 40 MG/1
40 TABLET ORAL DAILY
Status: ON HOLD | COMMUNITY
End: 2018-12-24 | Stop reason: HOSPADM

## 2018-06-15 RX ORDER — QUETIAPINE 300 MG/1
600 TABLET, FILM COATED, EXTENDED RELEASE ORAL NIGHTLY
COMMUNITY

## 2018-06-15 RX ORDER — 0.9 % SODIUM CHLORIDE 0.9 %
500 INTRAVENOUS SOLUTION INTRAVENOUS ONCE
Status: COMPLETED | OUTPATIENT
Start: 2018-06-15 | End: 2018-06-15

## 2018-06-15 RX ADMIN — SODIUM CHLORIDE 500 ML: 9 INJECTION, SOLUTION INTRAVENOUS at 01:18

## 2018-06-15 RX ADMIN — CIPROFLOXACIN 500 MG: 500 TABLET, FILM COATED ORAL at 03:05

## 2018-06-15 RX ADMIN — IOPAMIDOL 100 ML: 755 INJECTION, SOLUTION INTRAVENOUS at 02:08

## 2018-06-15 RX ADMIN — ONDANSETRON 4 MG: 2 INJECTION INTRAMUSCULAR; INTRAVENOUS at 01:18

## 2018-06-15 ASSESSMENT — ENCOUNTER SYMPTOMS
ABDOMINAL PAIN: 1
CHEST TIGHTNESS: 1
RHINORRHEA: 0
SHORTNESS OF BREATH: 1
COUGH: 0
VOMITING: 0
SORE THROAT: 0
TROUBLE SWALLOWING: 0
COLOR CHANGE: 0
NAUSEA: 1
BACK PAIN: 0
CONSTIPATION: 0
ABDOMINAL DISTENTION: 0
DIARRHEA: 0
WHEEZING: 0

## 2018-06-15 ASSESSMENT — PAIN SCALES - GENERAL: PAINLEVEL_OUTOF10: 8

## 2018-06-15 ASSESSMENT — PAIN DESCRIPTION - DESCRIPTORS: DESCRIPTORS: SHARP;BURNING

## 2018-06-15 ASSESSMENT — PATIENT HEALTH QUESTIONNAIRE - PHQ9: SUM OF ALL RESPONSES TO PHQ QUESTIONS 1-9: 27

## 2018-06-15 ASSESSMENT — PAIN DESCRIPTION - PROGRESSION: CLINICAL_PROGRESSION: NOT CHANGED

## 2018-06-15 ASSESSMENT — PAIN DESCRIPTION - LOCATION: LOCATION: ABDOMEN

## 2018-06-15 ASSESSMENT — PAIN DESCRIPTION - ONSET: ONSET: ON-GOING

## 2018-06-15 ASSESSMENT — PAIN DESCRIPTION - ORIENTATION: ORIENTATION: RIGHT;LOWER

## 2018-06-17 LAB — URINE CULTURE, ROUTINE: NORMAL

## 2018-06-27 ENCOUNTER — HOSPITAL ENCOUNTER (EMERGENCY)
Age: 58
Discharge: LEFT BEFORE DISCHARGE | End: 2018-06-27
Payer: COMMERCIAL

## 2018-07-15 ENCOUNTER — HOSPITAL ENCOUNTER (EMERGENCY)
Age: 58
Discharge: HOME | End: 2018-07-15
Payer: COMMERCIAL

## 2018-07-15 VITALS
OXYGEN SATURATION: 98 % | BODY MASS INDEX: 23.7 KG/M2 | SYSTOLIC BLOOD PRESSURE: 129 MMHG | HEIGHT: 69 IN | DIASTOLIC BLOOD PRESSURE: 72 MMHG | RESPIRATION RATE: 16 BRPM | WEIGHT: 160 LBS | HEART RATE: 88 BPM | TEMPERATURE: 98.1 F

## 2018-07-15 DIAGNOSIS — F11.10 HEROIN ABUSE (HCC): ICD-10-CM

## 2018-07-15 DIAGNOSIS — K40.90 RIGHT INGUINAL HERNIA: Primary | ICD-10-CM

## 2018-07-15 PROCEDURE — 96372 THER/PROPH/DIAG INJ SC/IM: CPT

## 2018-07-15 PROCEDURE — 6360000002 HC RX W HCPCS: Performed by: PERSONAL EMERGENCY RESPONSE ATTENDANT

## 2018-07-15 PROCEDURE — 99282 EMERGENCY DEPT VISIT SF MDM: CPT

## 2018-07-15 RX ORDER — KETOROLAC TROMETHAMINE 30 MG/ML
60 INJECTION, SOLUTION INTRAMUSCULAR; INTRAVENOUS ONCE
Status: COMPLETED | OUTPATIENT
Start: 2018-07-15 | End: 2018-07-15

## 2018-07-15 RX ORDER — KETOROLAC TROMETHAMINE 10 MG/1
10 TABLET, FILM COATED ORAL EVERY 6 HOURS PRN
Qty: 20 TABLET | Refills: 0 | Status: ON HOLD | OUTPATIENT
Start: 2018-07-15 | End: 2018-12-24 | Stop reason: HOSPADM

## 2018-07-15 RX ADMIN — KETOROLAC TROMETHAMINE 60 MG: 30 INJECTION, SOLUTION INTRAMUSCULAR at 22:37

## 2018-07-15 ASSESSMENT — ENCOUNTER SYMPTOMS
SHORTNESS OF BREATH: 0
RHINORRHEA: 0
COUGH: 0
NAUSEA: 0
COLOR CHANGE: 0
VOMITING: 0
ABDOMINAL PAIN: 0
BLOOD IN STOOL: 0
ABDOMINAL DISTENTION: 0
DIARRHEA: 0
SORE THROAT: 0

## 2018-07-15 ASSESSMENT — PAIN SCALES - GENERAL
PAINLEVEL_OUTOF10: 8
PAINLEVEL_OUTOF10: 10

## 2018-07-15 ASSESSMENT — PAIN DESCRIPTION - LOCATION: LOCATION: GROIN

## 2018-07-15 ASSESSMENT — PAIN DESCRIPTION - ORIENTATION: ORIENTATION: RIGHT

## 2018-07-15 ASSESSMENT — PAIN DESCRIPTION - DESCRIPTORS: DESCRIPTORS: SHARP

## 2018-07-15 ASSESSMENT — PAIN DESCRIPTION - PAIN TYPE: TYPE: ACUTE PAIN

## 2018-07-16 NOTE — ED PROVIDER NOTES
rebound and no guarding. Genitourinary:   Genitourinary Comments: Moderate sized right inguinal hernia which is visualized on physical exam.  Moderate tenderness to palpation. bowel sounds hypoactive, no rebound rigidity, no pulsatile mass or bruit, abdomen soft and nondistended   Musculoskeletal: Normal range of motion. Neurological: He is alert and oriented to person, place, and time. He has normal reflexes. Skin: Skin is warm and dry. No rash noted. Psychiatric: He has a normal mood and affect. His behavior is normal. Judgment and thought content normal.       DIAGNOSTIC RESULTS     EKG: All EKG's are interpreted by the Emergency Department Physician who either signs or Co-signs this chart in the absence of a cardiologist.        RADIOLOGY:   Non-plain film images such as CT, Ultrasound and MRI are read by the radiologist. Plain radiographic images are visualized and preliminarily interpreted by the emergency physician with the below findings:    Interpretation per the Radiologist below, if available at the time of this note:    No orders to display           LABS:  Labs Reviewed - No data to display    All other labs were within normal range or not returned as of this dictation. EMERGENCY DEPARTMENT COURSE and DIFFERENTIAL DIAGNOSIS/MDM:   Vitals:    Vitals:    07/15/18 2128   BP: 129/72   Pulse: 88   Resp: 16   Temp: 98.1 °F (36.7 °C)   SpO2: 98%   Weight: 160 lb (72.6 kg)   Height: 5' 9\" (1.753 m)         MDM    Patient be given Toradol for pain. Patient spoke to [facility]. Patient already has outside resources he is utilizing. Patient be sent home with Toradol for pain. Standard anticipatory guidance given to patient upon discharge. Have given them a specific time frame in which to follow-up and who to follow-up with. I have also advised them that they should return to the emergency department if they get worse, or not getting better or develop any new or concerning symptoms.  Patient demonstrates understanding. CRITICAL CARE TIME   Total Critical Care time was 0 minutes, excluding separately reportable procedures. There was a high probability of clinically significant/life threatening deterioration in the patient's condition which required my urgent intervention. Procedures    FINAL IMPRESSION      1. Right inguinal hernia    2. Heroin abuse          DISPOSITION/PLAN   DISPOSITION Decision To Discharge 07/15/2018 11:17:30 PM      PATIENT REFERRED TO:  Mayco Patel MD  [address] [address]  [phone]      CONTACT [facility] WHEN YOU ARE CLEAN      DISCHARGE MEDICATIONS:  New Prescriptions    KETOROLAC (TORADOL) 10 MG TABLET    Take 1 tablet by mouth every 6 hours as needed for Pain          (Please note that portions of this note were completed with a voice recognition program.  Efforts were made to edit the dictations but occasionally words are mis-transcribed. )    NORBERT Calles (electronically signed)  Emergency Physician [address]  07/15/18 6574

## 2018-08-11 ENCOUNTER — APPOINTMENT (OUTPATIENT)
Dept: GENERAL RADIOLOGY | Age: 58
End: 2018-08-11
Payer: COMMERCIAL

## 2018-08-11 ENCOUNTER — HOSPITAL ENCOUNTER (EMERGENCY)
Age: 58
Discharge: HOME | End: 2018-08-11
Attending: STUDENT IN AN ORGANIZED HEALTH CARE EDUCATION/TRAINING PROGRAM
Payer: COMMERCIAL

## 2018-08-11 ENCOUNTER — APPOINTMENT (OUTPATIENT)
Dept: CT IMAGING | Age: 58
End: 2018-08-11
Payer: COMMERCIAL

## 2018-08-11 VITALS
HEIGHT: 69 IN | TEMPERATURE: 99 F | BODY MASS INDEX: 22.96 KG/M2 | RESPIRATION RATE: 18 BRPM | OXYGEN SATURATION: 100 % | WEIGHT: 155 LBS | SYSTOLIC BLOOD PRESSURE: 166 MMHG | HEART RATE: 61 BPM | DIASTOLIC BLOOD PRESSURE: 79 MMHG

## 2018-08-11 DIAGNOSIS — S61.452A BITE WOUND OF LEFT HAND, INITIAL ENCOUNTER: Primary | ICD-10-CM

## 2018-08-11 DIAGNOSIS — F11.10 OPIATE ABUSE, CONTINUOUS (HCC): ICD-10-CM

## 2018-08-11 DIAGNOSIS — K40.90 INGUINAL HERNIA OF RIGHT SIDE WITHOUT OBSTRUCTION OR GANGRENE: ICD-10-CM

## 2018-08-11 DIAGNOSIS — N43.3 HYDROCELE IN ADULT: ICD-10-CM

## 2018-08-11 DIAGNOSIS — F14.10 COCAINE ABUSE (HCC): ICD-10-CM

## 2018-08-11 DIAGNOSIS — F43.20 ADJUSTMENT DISORDER, UNSPECIFIED TYPE: ICD-10-CM

## 2018-08-11 LAB
ACETAMINOPHEN LEVEL: <5 UG/ML (ref 10–30)
ALBUMIN SERPL-MCNC: 3.8 G/DL (ref 3.9–4.9)
ALP BLD-CCNC: 78 U/L (ref 35–104)
ALT SERPL-CCNC: 178 U/L (ref 0–41)
AMPHETAMINE SCREEN, URINE: ABNORMAL
ANION GAP SERPL CALCULATED.3IONS-SCNC: 12 MEQ/L (ref 7–13)
APTT: 29.1 SEC (ref 21.6–35.4)
AST SERPL-CCNC: 103 U/L (ref 0–40)
BARBITURATE SCREEN URINE: ABNORMAL
BASOPHILS ABSOLUTE: 0 K/UL (ref 0–0.2)
BASOPHILS RELATIVE PERCENT: 0.3 %
BENZODIAZEPINE SCREEN, URINE: ABNORMAL
BILIRUB SERPL-MCNC: 0.4 MG/DL (ref 0–1.2)
BILIRUBIN URINE: NEGATIVE
BLOOD, URINE: NEGATIVE
BUN BLDV-MCNC: 15 MG/DL (ref 6–20)
C-REACTIVE PROTEIN, HIGH SENSITIVITY: 2.8 MG/L (ref 0–5)
CALCIUM SERPL-MCNC: 9 MG/DL (ref 8.6–10.2)
CANNABINOID SCREEN URINE: ABNORMAL
CHLORIDE BLD-SCNC: 107 MEQ/L (ref 98–107)
CK MB: 3.6 NG/ML (ref 0–6.7)
CLARITY: CLEAR
CO2: 23 MEQ/L (ref 22–29)
COCAINE METABOLITE SCREEN URINE: POSITIVE
COLOR: YELLOW
CREAT SERPL-MCNC: 1 MG/DL (ref 0.7–1.2)
CREATINE KINASE-MB INDEX: 2.3 % (ref 0–3.5)
EOSINOPHILS ABSOLUTE: 0 K/UL (ref 0–0.7)
EOSINOPHILS RELATIVE PERCENT: 0.4 %
ETHANOL PERCENT: NORMAL G/DL
ETHANOL: <10 MG/DL (ref 0–0.08)
GFR AFRICAN AMERICAN: >60
GFR NON-AFRICAN AMERICAN: >60
GLOBULIN: 3.5 G/DL (ref 2.3–3.5)
GLUCOSE BLD-MCNC: 138 MG/DL (ref 74–109)
GLUCOSE URINE: NEGATIVE MG/DL
HCT VFR BLD CALC: 35.9 % (ref 42–52)
HEMOGLOBIN: 12.5 G/DL (ref 14–18)
INR BLD: 1.1
KETONES, URINE: NEGATIVE MG/DL
LACTIC ACID: 1.7 MMOL/L (ref 0.5–2.2)
LEUKOCYTE ESTERASE, URINE: NEGATIVE
LIPASE: 15 U/L (ref 13–60)
LYMPHOCYTES ABSOLUTE: 0.9 K/UL (ref 1–4.8)
LYMPHOCYTES RELATIVE PERCENT: 15 %
Lab: ABNORMAL
MCH RBC QN AUTO: 32.1 PG (ref 27–31.3)
MCHC RBC AUTO-ENTMCNC: 34.9 % (ref 33–37)
MCV RBC AUTO: 91.9 FL (ref 80–100)
MONOCYTES ABSOLUTE: 0.3 K/UL (ref 0.2–0.8)
MONOCYTES RELATIVE PERCENT: 5.1 %
NEUTROPHILS ABSOLUTE: 4.6 K/UL (ref 1.4–6.5)
NEUTROPHILS RELATIVE PERCENT: 79.2 %
NITRITE, URINE: NEGATIVE
OPIATE SCREEN URINE: POSITIVE
PDW BLD-RTO: 14.5 % (ref 11.5–14.5)
PH UA: 5.5 (ref 5–9)
PHENCYCLIDINE SCREEN URINE: ABNORMAL
PLATELET # BLD: 196 K/UL (ref 130–400)
POTASSIUM SERPL-SCNC: 3.8 MEQ/L (ref 3.5–5.1)
PROTEIN UA: NEGATIVE MG/DL
PROTHROMBIN TIME: 11.3 SEC (ref 9.6–12.3)
RBC # BLD: 3.9 M/UL (ref 4.7–6.1)
SALICYLATE, SERUM: <0.3 MG/DL (ref 15–30)
SEDIMENTATION RATE, ERYTHROCYTE: 11 MM (ref 0–20)
SODIUM BLD-SCNC: 142 MEQ/L (ref 132–144)
SPECIFIC GRAVITY UA: 1.02 (ref 1–1.03)
TOTAL CK: 157 U/L (ref 0–190)
TOTAL PROTEIN: 7.3 G/DL (ref 6.4–8.1)
TSH SERPL DL<=0.05 MIU/L-ACNC: 0.54 UIU/ML (ref 0.27–4.2)
URINE REFLEX TO CULTURE: NORMAL
UROBILINOGEN, URINE: 0.2 E.U./DL
WBC # BLD: 5.8 K/UL (ref 4.8–10.8)

## 2018-08-11 PROCEDURE — 80053 COMPREHEN METABOLIC PANEL: CPT

## 2018-08-11 PROCEDURE — 96365 THER/PROPH/DIAG IV INF INIT: CPT

## 2018-08-11 PROCEDURE — 99285 EMERGENCY DEPT VISIT HI MDM: CPT

## 2018-08-11 PROCEDURE — 82553 CREATINE MB FRACTION: CPT

## 2018-08-11 PROCEDURE — 6360000004 HC RX CONTRAST MEDICATION: Performed by: STUDENT IN AN ORGANIZED HEALTH CARE EDUCATION/TRAINING PROGRAM

## 2018-08-11 PROCEDURE — 2500000003 HC RX 250 WO HCPCS: Performed by: STUDENT IN AN ORGANIZED HEALTH CARE EDUCATION/TRAINING PROGRAM

## 2018-08-11 PROCEDURE — 85610 PROTHROMBIN TIME: CPT

## 2018-08-11 PROCEDURE — 83605 ASSAY OF LACTIC ACID: CPT

## 2018-08-11 PROCEDURE — 85730 THROMBOPLASTIN TIME PARTIAL: CPT

## 2018-08-11 PROCEDURE — 6360000002 HC RX W HCPCS: Performed by: STUDENT IN AN ORGANIZED HEALTH CARE EDUCATION/TRAINING PROGRAM

## 2018-08-11 PROCEDURE — 96372 THER/PROPH/DIAG INJ SC/IM: CPT

## 2018-08-11 PROCEDURE — 6370000000 HC RX 637 (ALT 250 FOR IP): Performed by: STUDENT IN AN ORGANIZED HEALTH CARE EDUCATION/TRAINING PROGRAM

## 2018-08-11 PROCEDURE — G0480 DRUG TEST DEF 1-7 CLASSES: HCPCS

## 2018-08-11 PROCEDURE — 80307 DRUG TEST PRSMV CHEM ANLYZR: CPT

## 2018-08-11 PROCEDURE — 36415 COLL VENOUS BLD VENIPUNCTURE: CPT

## 2018-08-11 PROCEDURE — 74177 CT ABD & PELVIS W/CONTRAST: CPT

## 2018-08-11 PROCEDURE — 2580000003 HC RX 258: Performed by: STUDENT IN AN ORGANIZED HEALTH CARE EDUCATION/TRAINING PROGRAM

## 2018-08-11 PROCEDURE — 86141 C-REACTIVE PROTEIN HS: CPT

## 2018-08-11 PROCEDURE — 84443 ASSAY THYROID STIM HORMONE: CPT

## 2018-08-11 PROCEDURE — 96375 TX/PRO/DX INJ NEW DRUG ADDON: CPT

## 2018-08-11 PROCEDURE — 83690 ASSAY OF LIPASE: CPT

## 2018-08-11 PROCEDURE — 96376 TX/PRO/DX INJ SAME DRUG ADON: CPT

## 2018-08-11 PROCEDURE — 85025 COMPLETE CBC W/AUTO DIFF WBC: CPT

## 2018-08-11 PROCEDURE — 82550 ASSAY OF CK (CPK): CPT

## 2018-08-11 PROCEDURE — 81003 URINALYSIS AUTO W/O SCOPE: CPT

## 2018-08-11 PROCEDURE — 73130 X-RAY EXAM OF HAND: CPT

## 2018-08-11 PROCEDURE — 85652 RBC SED RATE AUTOMATED: CPT

## 2018-08-11 RX ORDER — AMOXICILLIN AND CLAVULANATE POTASSIUM 875; 125 MG/1; MG/1
1 TABLET, FILM COATED ORAL 2 TIMES DAILY
Qty: 20 TABLET | Refills: 0 | Status: SHIPPED | OUTPATIENT
Start: 2018-08-11 | End: 2018-08-21

## 2018-08-11 RX ORDER — KETOROLAC TROMETHAMINE 30 MG/ML
30 INJECTION, SOLUTION INTRAMUSCULAR; INTRAVENOUS ONCE
Status: COMPLETED | OUTPATIENT
Start: 2018-08-11 | End: 2018-08-11

## 2018-08-11 RX ORDER — PROMETHAZINE HYDROCHLORIDE 25 MG/ML
25 INJECTION, SOLUTION INTRAMUSCULAR; INTRAVENOUS ONCE
Status: COMPLETED | OUTPATIENT
Start: 2018-08-11 | End: 2018-08-11

## 2018-08-11 RX ORDER — MELOXICAM 15 MG/1
15 TABLET ORAL DAILY
Qty: 10 TABLET | Refills: 0 | Status: ON HOLD | OUTPATIENT
Start: 2018-08-11 | End: 2018-12-24 | Stop reason: HOSPADM

## 2018-08-11 RX ORDER — AMOXICILLIN AND CLAVULANATE POTASSIUM 875; 125 MG/1; MG/1
1 TABLET, FILM COATED ORAL ONCE
Status: COMPLETED | OUTPATIENT
Start: 2018-08-11 | End: 2018-08-11

## 2018-08-11 RX ADMIN — KETOROLAC TROMETHAMINE 30 MG: 30 INJECTION, SOLUTION INTRAMUSCULAR at 15:08

## 2018-08-11 RX ADMIN — KETOROLAC TROMETHAMINE 30 MG: 30 INJECTION, SOLUTION INTRAMUSCULAR at 16:13

## 2018-08-11 RX ADMIN — AMOXICILLIN AND CLAVULANATE POTASSIUM 1 TABLET: 875; 125 TABLET, FILM COATED ORAL at 16:13

## 2018-08-11 RX ADMIN — BARIUM SULFATE 450 ML: 20 SUSPENSION ORAL at 14:04

## 2018-08-11 RX ADMIN — PROMETHAZINE HYDROCHLORIDE 25 MG: 25 INJECTION, SOLUTION INTRAMUSCULAR; INTRAVENOUS at 19:32

## 2018-08-11 RX ADMIN — IOPAMIDOL 100 ML: 755 INJECTION, SOLUTION INTRAVENOUS at 15:17

## 2018-08-11 RX ADMIN — SODIUM CHLORIDE 3 G: 900 INJECTION INTRAVENOUS at 15:10

## 2018-08-11 ASSESSMENT — ENCOUNTER SYMPTOMS
BACK PAIN: 0
TROUBLE SWALLOWING: 0
CHEST TIGHTNESS: 0
VOMITING: 0
SINUS PRESSURE: 0
DIARRHEA: 0
ABDOMINAL PAIN: 1
COUGH: 0
SHORTNESS OF BREATH: 0

## 2018-08-11 ASSESSMENT — PAIN DESCRIPTION - DESCRIPTORS
DESCRIPTORS: DISCOMFORT;CONSTANT
DESCRIPTORS: DISCOMFORT;SHARP;SPASM

## 2018-08-11 ASSESSMENT — PAIN SCALES - GENERAL
PAINLEVEL_OUTOF10: 8
PAINLEVEL_OUTOF10: 8
PAINLEVEL_OUTOF10: 10
PAINLEVEL_OUTOF10: 10

## 2018-08-11 ASSESSMENT — PAIN DESCRIPTION - FREQUENCY
FREQUENCY: CONTINUOUS
FREQUENCY: CONTINUOUS

## 2018-08-11 ASSESSMENT — PAIN DESCRIPTION - ORIENTATION
ORIENTATION: RIGHT
ORIENTATION: MID

## 2018-08-11 ASSESSMENT — PAIN DESCRIPTION - LOCATION
LOCATION: ABDOMEN
LOCATION: SCROTUM

## 2018-08-11 ASSESSMENT — PATIENT HEALTH QUESTIONNAIRE - PHQ9: SUM OF ALL RESPONSES TO PHQ QUESTIONS 1-9: 10
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
## 2018-08-11 NOTE — ED NOTES
Pt having emesis X 2 physician notified      Delores Cabrera.  [facility]  08/11/18 1924
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
## 2018-08-12 LAB
GFR AFRICAN AMERICAN: >60
GFR NON-AFRICAN AMERICAN: >60
PERFORMED ON: NORMAL
POC CREATININE: 1 MG/DL (ref 0.9–1.3)
POC SAMPLE TYPE: NORMAL

## 2018-11-03 ENCOUNTER — HOSPITAL ENCOUNTER (EMERGENCY)
Age: 58
Discharge: HOME | End: 2018-11-03
Payer: COMMERCIAL

## 2018-11-03 VITALS
RESPIRATION RATE: 18 BRPM | WEIGHT: 155 LBS | BODY MASS INDEX: 22.19 KG/M2 | TEMPERATURE: 97.6 F | SYSTOLIC BLOOD PRESSURE: 167 MMHG | OXYGEN SATURATION: 97 % | HEART RATE: 85 BPM | HEIGHT: 70 IN | DIASTOLIC BLOOD PRESSURE: 89 MMHG

## 2018-11-03 DIAGNOSIS — K40.91 UNILATERAL RECURRENT INGUINAL HERNIA WITHOUT OBSTRUCTION OR GANGRENE: Primary | ICD-10-CM

## 2018-11-03 PROCEDURE — 99283 EMERGENCY DEPT VISIT LOW MDM: CPT

## 2018-11-03 PROCEDURE — 6370000000 HC RX 637 (ALT 250 FOR IP): Performed by: NURSE PRACTITIONER

## 2018-11-03 RX ORDER — IBUPROFEN 800 MG/1
800 TABLET ORAL EVERY 8 HOURS PRN
Qty: 20 TABLET | Refills: 0 | Status: SHIPPED | OUTPATIENT
Start: 2018-11-03

## 2018-11-03 RX ORDER — IBUPROFEN 800 MG/1
800 TABLET ORAL ONCE
Status: COMPLETED | OUTPATIENT
Start: 2018-11-03 | End: 2018-11-03

## 2018-11-03 RX ADMIN — IBUPROFEN 800 MG: 800 TABLET ORAL at 16:40

## 2018-11-03 ASSESSMENT — ENCOUNTER SYMPTOMS
SORE THROAT: 0
RHINORRHEA: 0
PHOTOPHOBIA: 0
VOMITING: 0
NAUSEA: 0
SHORTNESS OF BREATH: 0
ABDOMINAL PAIN: 0
COUGH: 0
BACK PAIN: 0
EYE PAIN: 0
DIARRHEA: 0

## 2018-11-03 ASSESSMENT — PAIN DESCRIPTION - ORIENTATION: ORIENTATION: RIGHT

## 2018-11-03 ASSESSMENT — PAIN SCALES - GENERAL
PAINLEVEL_OUTOF10: 8
PAINLEVEL_OUTOF10: 8

## 2018-11-03 ASSESSMENT — PAIN DESCRIPTION - ONSET: ONSET: ON-GOING

## 2018-11-03 ASSESSMENT — PAIN DESCRIPTION - FREQUENCY: FREQUENCY: INTERMITTENT

## 2018-11-03 ASSESSMENT — PAIN DESCRIPTION - DESCRIPTORS: DESCRIPTORS: DISCOMFORT;SORE;THROBBING

## 2018-11-03 ASSESSMENT — PAIN DESCRIPTION - LOCATION: LOCATION: GROIN

## 2018-11-03 NOTE — ED NOTES
Pt having trouble with pain in right lower groin, hurts when urinating per pt. NP David Webster aware.       Suri Reynolds RN  11/03/18 6926

## 2018-11-03 NOTE — ED PROVIDER NOTES
Respiratory: Negative for cough and shortness of breath. Cardiovascular: Negative for chest pain and palpitations. Gastrointestinal: Negative for abdominal pain, diarrhea, nausea and vomiting. Genitourinary: Negative for dysuria and flank pain. Hernia pain     Musculoskeletal: Negative for back pain. Skin: Negative for rash. Neurological: Negative for dizziness, light-headedness and headaches. Except as noted above the remainder of the review of systems was reviewed and negative.        PAST MEDICAL HISTORY     Past Medical History:   Diagnosis Date    Abnormality of gait and mobility     Alcohol abuse 2014    Anxiety     Ataxia     CAD (coronary artery disease)     CHF (congestive heart failure) (HCC)     Chronic back pain     Cocaine abuse ([facility] [address] 2014    Depression     Heroin abuse ([facility] [address] 2014    History of hepatitis C     Hyperlipidemia     Hypertension     Late effects of CVA (cerebrovascular accident)     Leukocytosis     Myocardial infarct, old 2010    S/P CABG x 3     Tobacco abuse 2014    Transient ischemic attack      Past Surgical History:   Procedure Laterality Date    CORONARY ARTERY BYPASS GRAFT      EXCISION / BIOPSY SKIN LESION OF ARM N/A 4/8/2017    I&D DEBRIDEMENT NECROTIC WOUND ABSCESS LEFT MEDIAL ELBOW AND RIGHT FOREARM  performed by Urban Linder MD at [address] [place] [address] ECHOCARDIOGRAM  5/15/2013     Social History     Social History    Marital status:      Spouse name: N/A    Number of children: 1    Years of education: 15     Social History Main Topics    Smoking status: Current Every Day Smoker     Packs/day: 2.00     Years: 45.00     Types: Cigarettes    Smokeless tobacco: Current User    Alcohol use No      Comment: social    Drug use: Yes     Frequency: 1.0 time per week     Types: Opiates , Cocaine, Other-see comments      Comment: heroin, crack, xanax    Sexual activity: Yes     Partners: Female     Other Topics Concern  None     Social History Narrative    The patient lives with a friend in a two story home with bedrooms and bathrooms on both levels. His social support includes his friend. He has a walker and a cane at home. It is not indicated that he was receiving community services prior to admission. He wears eye glasses and upper dentures. He does not have history of falls prior to admission. He was independent with mobility and self care prior to admission. His goal is to feel better. SCREENINGS      @FLOW([phone])@      PHYSICAL EXAM    (up to 7 for level 4, 8 or more for level 5)     ED Triage Vitals [11/03/18 1635]   BP Temp Temp Source Pulse Resp SpO2 Height Weight   (!) 167/89 97.6 °F (36.4 °C) Oral 85 18 97 % 5' 10\" (1.778 m) 155 lb (70.3 kg)       Physical Exam   Constitutional: He is oriented to person, place, and time. He appears well-developed and well-nourished. He is active. No distress. HENT:   Head: Normocephalic and atraumatic. Mouth/Throat: Mucous membranes are normal.   Neck: Normal range of motion. Neck supple. Cardiovascular: Normal rate, regular rhythm, normal heart sounds, intact distal pulses and normal pulses. Pulmonary/Chest: Effort normal and breath sounds normal.   Abdominal: Soft. Normal appearance and bowel sounds are normal. There is no tenderness. Genitourinary:   Genitourinary Comments: There is a right-sided inguinal hernia that is reducible on exam.  No testicular pain or tenderness. Neurological: He is alert and oriented to person, place, and time. He has normal strength. Skin: Skin is warm, dry and intact. No rash noted. He is not diaphoretic. Nursing note and vitals reviewed.       DIAGNOSTIC RESULTS     EKG: All EKG's are interpreted by the Emergency Department Physician who either signs or Co-signsthis chart in the absence of a cardiologist.        RADIOLOGY:   Non-plain filmimages such as CT, Ultrasound and MRI are read by the radiologist. Ned Gun

## 2018-12-13 ENCOUNTER — PREP FOR PROCEDURE (OUTPATIENT)
Dept: SURGERY | Age: 58
End: 2018-12-13

## 2018-12-13 ENCOUNTER — OFFICE VISIT (OUTPATIENT)
Dept: SURGERY | Age: 58
End: 2018-12-13
Payer: COMMERCIAL

## 2018-12-13 VITALS
WEIGHT: 161.6 LBS | HEIGHT: 70 IN | BODY MASS INDEX: 23.13 KG/M2 | DIASTOLIC BLOOD PRESSURE: 84 MMHG | SYSTOLIC BLOOD PRESSURE: 132 MMHG | TEMPERATURE: 98.8 F

## 2018-12-13 DIAGNOSIS — K40.90 RIGHT INGUINAL HERNIA: Primary | ICD-10-CM

## 2018-12-13 PROCEDURE — G8427 DOCREV CUR MEDS BY ELIG CLIN: HCPCS | Performed by: SURGERY

## 2018-12-13 PROCEDURE — 4004F PT TOBACCO SCREEN RCVD TLK: CPT | Performed by: SURGERY

## 2018-12-13 PROCEDURE — G8598 ASA/ANTIPLAT THER USED: HCPCS | Performed by: SURGERY

## 2018-12-13 PROCEDURE — G8420 CALC BMI NORM PARAMETERS: HCPCS | Performed by: SURGERY

## 2018-12-13 PROCEDURE — 99203 OFFICE O/P NEW LOW 30 MIN: CPT | Performed by: SURGERY

## 2018-12-13 PROCEDURE — 3017F COLORECTAL CA SCREEN DOC REV: CPT | Performed by: SURGERY

## 2018-12-13 PROCEDURE — G8484 FLU IMMUNIZE NO ADMIN: HCPCS | Performed by: SURGERY

## 2018-12-13 ASSESSMENT — ENCOUNTER SYMPTOMS
CONSTIPATION: 0
RHINORRHEA: 0
ALLERGIC/IMMUNOLOGIC NEGATIVE: 1
ABDOMINAL PAIN: 0
COLOR CHANGE: 0
ABDOMINAL DISTENTION: 0
SHORTNESS OF BREATH: 0
EYES NEGATIVE: 1
CHEST TIGHTNESS: 0
BLOOD IN STOOL: 0

## 2018-12-13 NOTE — PROGRESS NOTES
Subjective:      Patient ID: Jacque Ornelas is a 60 y.o. male. HPI  Jacque Ornelas is a 60 y.o. male seen at the request of [facility] for a possible Right inguinal hernia. It was first noticed 1 year(s) ago. The patient complains of discomfort and complains of a groin mass. Pain is sharp and rates it as a 10 The patient denies GI,  or respiratory problems. The hernia is worse with standing. It does interfere with normal functions. The patient has not had previous surgical treatment. Testing was not done. The patient is on anticoagulants(Plavix). He is on Suboxone therapy    Past Medical History:   Diagnosis Date    Abnormality of gait and mobility     Alcohol abuse 2014    Anxiety     Ataxia     CAD (coronary artery disease)     CHF (congestive heart failure) (HCC)     Chronic back pain     Cocaine abuse ([facility] [address] 2014    Depression     Heroin abuse ([facility] [address] 2014    History of hepatitis C     Hyperlipidemia     Hypertension     Late effects of CVA (cerebrovascular accident)     Leukocytosis     Myocardial infarct, old 2010    S/P CABG x 3     Tobacco abuse 2014    Transient ischemic attack      Past Surgical History:   Procedure Laterality Date    CORONARY ARTERY BYPASS GRAFT      EXCISION / BIOPSY SKIN LESION OF ARM N/A 4/8/2017    I&D DEBRIDEMENT NECROTIC WOUND ABSCESS LEFT MEDIAL ELBOW AND RIGHT FOREARM  performed by Kemal De La Torre MD at [address] [place] [address] ECHOCARDIOGRAM  5/15/2013     Social History   Substance Use Topics    Smoking status: Current Every Day Smoker     Packs/day: 2.00     Years: 45.00     Types: Cigarettes    Smokeless tobacco: Current User    Alcohol use No      Comment: social     Family History   Problem Relation Age of Onset    Family history unknown: Yes     Patient has no known allergies.   No Known Allergies  Current Outpatient Prescriptions   Medication Sig Dispense Refill    QUEtiapine (SEROQUEL XR) 300 MG extended release tablet Take 600 mg by sneezing. Eyes: Negative. Negative for visual disturbance. Respiratory: Negative for chest tightness and shortness of breath. Cardiovascular: Negative for chest pain and leg swelling. Gastrointestinal: Negative for abdominal distention, abdominal pain, blood in stool and constipation. Endocrine: Negative. Genitourinary: Negative for difficulty urinating. Musculoskeletal: Negative for arthralgias, gait problem and myalgias. Skin: Negative for color change. Allergic/Immunologic: Negative. Neurological: Negative for dizziness, light-headedness, numbness and headaches. Hematological: Bruises/bleeds easily. Psychiatric/Behavioral: Negative for sleep disturbance. Objective:   Physical Exam   Constitutional: He is oriented to person, place, and time. He appears well-developed and well-nourished. No distress. HENT:   Mouth/Throat: Oropharynx is clear and moist.   Eyes: Pupils are equal, round, and reactive to light. Neck: Normal range of motion. Cardiovascular: Normal rate, regular rhythm and normal heart sounds. Pulmonary/Chest: Effort normal and breath sounds normal. No respiratory distress. Abdominal: There is generalized tenderness. A hernia is present. Hernia confirmed positive in the right inguinal area. Hernia confirmed negative in the left inguinal area. Musculoskeletal:   Normal gait   Neurological: He is alert and oriented to person, place, and time. Psychiatric: He has a normal mood and affect. Judgment normal.       Assessment:      Right inguinal hernia, easily reducible      Plan:      Right Inguinal hernia repair    The risks, benefits and indications for repair of the inguinal hernia are reviewed with the patient. The potential risks and complications including but not exclusive to bleeding, infection, nerve damage, testicular damage, chronic pain and recurrence were reviewed. Anticipated convalescence is discussed.   The probable use of prosthetic materials/

## 2018-12-20 ENCOUNTER — HOSPITAL ENCOUNTER (INPATIENT)
Age: 58
LOS: 3 days | Discharge: HOME | DRG: 753 | End: 2018-12-24
Attending: EMERGENCY MEDICINE | Admitting: PSYCHIATRY & NEUROLOGY
Payer: COMMERCIAL

## 2018-12-20 DIAGNOSIS — R45.851 SUICIDAL IDEATION: ICD-10-CM

## 2018-12-20 DIAGNOSIS — F10.929 ACUTE ALCOHOLIC INTOXICATION WITH COMPLICATION (HCC): ICD-10-CM

## 2018-12-20 DIAGNOSIS — F31.9 BIPOLAR 1 DISORDER (HCC): ICD-10-CM

## 2018-12-20 DIAGNOSIS — F11.11 HISTORY OF HEROIN ABUSE (HCC): Primary | ICD-10-CM

## 2018-12-20 DIAGNOSIS — F11.10 HEROIN ABUSE (HCC): ICD-10-CM

## 2018-12-20 PROCEDURE — 84443 ASSAY THYROID STIM HORMONE: CPT

## 2018-12-20 PROCEDURE — 6360000002 HC RX W HCPCS: Performed by: EMERGENCY MEDICINE

## 2018-12-20 PROCEDURE — 80053 COMPREHEN METABOLIC PANEL: CPT

## 2018-12-20 PROCEDURE — 36415 COLL VENOUS BLD VENIPUNCTURE: CPT

## 2018-12-20 PROCEDURE — 96372 THER/PROPH/DIAG INJ SC/IM: CPT

## 2018-12-20 PROCEDURE — G0480 DRUG TEST DEF 1-7 CLASSES: HCPCS

## 2018-12-20 PROCEDURE — 99285 EMERGENCY DEPT VISIT HI MDM: CPT

## 2018-12-20 PROCEDURE — 2580000003 HC RX 258: Performed by: EMERGENCY MEDICINE

## 2018-12-20 PROCEDURE — 96375 TX/PRO/DX INJ NEW DRUG ADDON: CPT

## 2018-12-20 PROCEDURE — 96374 THER/PROPH/DIAG INJ IV PUSH: CPT

## 2018-12-20 PROCEDURE — 85025 COMPLETE CBC W/AUTO DIFF WBC: CPT

## 2018-12-20 RX ORDER — 0.9 % SODIUM CHLORIDE 0.9 %
500 INTRAVENOUS SOLUTION INTRAVENOUS ONCE
Status: COMPLETED | OUTPATIENT
Start: 2018-12-20 | End: 2018-12-21

## 2018-12-20 RX ORDER — NALOXONE HYDROCHLORIDE 1 MG/ML
2 INJECTION INTRAMUSCULAR; INTRAVENOUS; SUBCUTANEOUS ONCE
Status: COMPLETED | OUTPATIENT
Start: 2018-12-20 | End: 2018-12-20

## 2018-12-20 RX ORDER — ONDANSETRON 2 MG/ML
4 INJECTION INTRAMUSCULAR; INTRAVENOUS ONCE
Status: COMPLETED | OUTPATIENT
Start: 2018-12-20 | End: 2018-12-20

## 2018-12-20 RX ORDER — SODIUM CHLORIDE 0.9 % (FLUSH) 0.9 %
3 SYRINGE (ML) INJECTION EVERY 8 HOURS
Status: DISCONTINUED | OUTPATIENT
Start: 2018-12-20 | End: 2018-12-21 | Stop reason: HOSPADM

## 2018-12-20 RX ORDER — LORAZEPAM 2 MG/ML
0.5 INJECTION INTRAMUSCULAR ONCE
Status: DISCONTINUED | OUTPATIENT
Start: 2018-12-20 | End: 2018-12-24 | Stop reason: HOSPADM

## 2018-12-20 RX ADMIN — NALOXONE HYDROCHLORIDE 2 MG: 1 INJECTION PARENTERAL at 23:22

## 2018-12-20 RX ADMIN — NALOXONE HYDROCHLORIDE 2 MG: 1 INJECTION PARENTERAL at 23:26

## 2018-12-20 RX ADMIN — SODIUM CHLORIDE 500 ML: 9 INJECTION, SOLUTION INTRAVENOUS at 23:22

## 2018-12-20 RX ADMIN — ONDANSETRON 4 MG: 2 INJECTION INTRAMUSCULAR; INTRAVENOUS at 23:23

## 2018-12-21 PROBLEM — F31.9 BIPOLAR 1 DISORDER, DEPRESSED (HCC): Status: ACTIVE | Noted: 2018-12-21

## 2018-12-21 LAB
ACETAMINOPHEN LEVEL: <5 UG/ML (ref 10–30)
ALBUMIN SERPL-MCNC: 4.1 G/DL (ref 3.9–4.9)
ALP BLD-CCNC: 66 U/L (ref 35–104)
ALT SERPL-CCNC: 81 U/L (ref 0–41)
ANION GAP SERPL CALCULATED.3IONS-SCNC: 12 MEQ/L (ref 7–13)
AST SERPL-CCNC: 81 U/L (ref 0–40)
BASOPHILS ABSOLUTE: 0 K/UL (ref 0–0.2)
BASOPHILS RELATIVE PERCENT: 1 %
BILIRUB SERPL-MCNC: 0.4 MG/DL (ref 0–1.2)
BUN BLDV-MCNC: 17 MG/DL (ref 6–20)
CALCIUM SERPL-MCNC: 9.5 MG/DL (ref 8.6–10.2)
CHLORIDE BLD-SCNC: 102 MEQ/L (ref 98–107)
CO2: 26 MEQ/L (ref 22–29)
CREAT SERPL-MCNC: 1.26 MG/DL (ref 0.7–1.2)
EOSINOPHILS ABSOLUTE: 0.1 K/UL (ref 0–0.7)
EOSINOPHILS RELATIVE PERCENT: 1.7 %
ETHANOL PERCENT: NORMAL G/DL
ETHANOL: <10 MG/DL (ref 0–0.08)
GFR AFRICAN AMERICAN: >60
GFR NON-AFRICAN AMERICAN: 58.5
GLOBULIN: 3.8 G/DL (ref 2.3–3.5)
GLUCOSE BLD-MCNC: 73 MG/DL (ref 74–109)
HCT VFR BLD CALC: 41.6 % (ref 42–52)
HEMOGLOBIN: 14.1 G/DL (ref 14–18)
LYMPHOCYTES ABSOLUTE: 1.6 K/UL (ref 1–4.8)
LYMPHOCYTES RELATIVE PERCENT: 33 %
MCH RBC QN AUTO: 32.2 PG (ref 27–31.3)
MCHC RBC AUTO-ENTMCNC: 34 % (ref 33–37)
MCV RBC AUTO: 94.8 FL (ref 80–100)
MONOCYTES ABSOLUTE: 0.4 K/UL (ref 0.2–0.8)
MONOCYTES RELATIVE PERCENT: 8.7 %
NEUTROPHILS ABSOLUTE: 2.7 K/UL (ref 1.4–6.5)
NEUTROPHILS RELATIVE PERCENT: 55.6 %
PDW BLD-RTO: 15.1 % (ref 11.5–14.5)
PLATELET # BLD: 209 K/UL (ref 130–400)
POTASSIUM SERPL-SCNC: 4 MEQ/L (ref 3.5–5.1)
RBC # BLD: 4.38 M/UL (ref 4.7–6.1)
SALICYLATE, SERUM: <0.3 MG/DL (ref 15–30)
SODIUM BLD-SCNC: 140 MEQ/L (ref 132–144)
TOTAL PROTEIN: 7.9 G/DL (ref 6.4–8.1)
TSH SERPL DL<=0.05 MIU/L-ACNC: 1.88 UIU/ML (ref 0.27–4.2)
WBC # BLD: 4.8 K/UL (ref 4.8–10.8)

## 2018-12-21 PROCEDURE — 6370000000 HC RX 637 (ALT 250 FOR IP): Performed by: PSYCHIATRY & NEUROLOGY

## 2018-12-21 PROCEDURE — 6360000002 HC RX W HCPCS: Performed by: EMERGENCY MEDICINE

## 2018-12-21 PROCEDURE — 1240000000 HC EMOTIONAL WELLNESS R&B

## 2018-12-21 PROCEDURE — 96376 TX/PRO/DX INJ SAME DRUG ADON: CPT

## 2018-12-21 PROCEDURE — 99222 1ST HOSP IP/OBS MODERATE 55: CPT | Performed by: PSYCHIATRY & NEUROLOGY

## 2018-12-21 RX ORDER — MAGNESIUM HYDROXIDE/ALUMINUM HYDROXICE/SIMETHICONE 120; 1200; 1200 MG/30ML; MG/30ML; MG/30ML
30 SUSPENSION ORAL 2 TIMES DAILY PRN
Status: DISCONTINUED | OUTPATIENT
Start: 2018-12-21 | End: 2018-12-24 | Stop reason: HOSPADM

## 2018-12-21 RX ORDER — TAMSULOSIN HYDROCHLORIDE 0.4 MG/1
0.4 CAPSULE ORAL DAILY
COMMUNITY

## 2018-12-21 RX ORDER — TRAZODONE HYDROCHLORIDE 50 MG/1
50 TABLET ORAL NIGHTLY PRN
Status: DISCONTINUED | OUTPATIENT
Start: 2018-12-21 | End: 2018-12-24 | Stop reason: HOSPADM

## 2018-12-21 RX ORDER — M-VIT,TX,IRON,MINS/CALC/FOLIC 27MG-0.4MG
1 TABLET ORAL DAILY
Status: DISCONTINUED | OUTPATIENT
Start: 2018-12-21 | End: 2018-12-24 | Stop reason: HOSPADM

## 2018-12-21 RX ORDER — ACETAMINOPHEN 325 MG/1
650 TABLET ORAL EVERY 4 HOURS PRN
Status: DISCONTINUED | OUTPATIENT
Start: 2018-12-21 | End: 2018-12-24 | Stop reason: HOSPADM

## 2018-12-21 RX ORDER — ACETAMINOPHEN 80 MG
TABLET,CHEWABLE ORAL ONCE
Status: COMPLETED | OUTPATIENT
Start: 2018-12-21 | End: 2018-12-21

## 2018-12-21 RX ORDER — CLONIDINE HYDROCHLORIDE 0.1 MG/1
0.1 TABLET ORAL EVERY 8 HOURS
Status: DISCONTINUED | OUTPATIENT
Start: 2018-12-24 | End: 2018-12-24 | Stop reason: HOSPADM

## 2018-12-21 RX ORDER — THIAMINE MONONITRATE (VIT B1) 100 MG
100 TABLET ORAL DAILY
Status: DISCONTINUED | OUTPATIENT
Start: 2018-12-21 | End: 2018-12-24 | Stop reason: HOSPADM

## 2018-12-21 RX ORDER — CLONIDINE HYDROCHLORIDE 0.1 MG/1
0.1 TABLET ORAL EVERY 4 HOURS
Status: DISPENSED | OUTPATIENT
Start: 2018-12-21 | End: 2018-12-23

## 2018-12-21 RX ORDER — OXCARBAZEPINE 300 MG/1
300 TABLET, FILM COATED ORAL 2 TIMES DAILY
Status: DISCONTINUED | OUTPATIENT
Start: 2018-12-21 | End: 2018-12-24 | Stop reason: HOSPADM

## 2018-12-21 RX ORDER — MIRTAZAPINE 15 MG/1
7.5 TABLET, FILM COATED ORAL NIGHTLY
Status: DISCONTINUED | OUTPATIENT
Start: 2018-12-21 | End: 2018-12-24 | Stop reason: HOSPADM

## 2018-12-21 RX ORDER — MIRTAZAPINE 15 MG/1
7.5 TABLET, FILM COATED ORAL NIGHTLY
COMMUNITY

## 2018-12-21 RX ORDER — CLONIDINE HYDROCHLORIDE 0.1 MG/1
0.1 TABLET ORAL EVERY 6 HOURS
Status: DISPENSED | OUTPATIENT
Start: 2018-12-23 | End: 2018-12-24

## 2018-12-21 RX ORDER — LOPERAMIDE HYDROCHLORIDE 2 MG/1
2 CAPSULE ORAL 3 TIMES DAILY PRN
Status: DISCONTINUED | OUTPATIENT
Start: 2018-12-21 | End: 2018-12-24 | Stop reason: HOSPADM

## 2018-12-21 RX ORDER — TAMSULOSIN HYDROCHLORIDE 0.4 MG/1
0.4 CAPSULE ORAL DAILY
Status: DISCONTINUED | OUTPATIENT
Start: 2018-12-21 | End: 2018-12-24 | Stop reason: HOSPADM

## 2018-12-21 RX ORDER — ONDANSETRON 2 MG/ML
4 INJECTION INTRAMUSCULAR; INTRAVENOUS ONCE
Status: COMPLETED | OUTPATIENT
Start: 2018-12-21 | End: 2018-12-21

## 2018-12-21 RX ORDER — NALOXONE HYDROCHLORIDE 1 MG/ML
2 INJECTION INTRAMUSCULAR; INTRAVENOUS; SUBCUTANEOUS ONCE
Status: COMPLETED | OUTPATIENT
Start: 2018-12-21 | End: 2018-12-21

## 2018-12-21 RX ORDER — HYDROXYZINE PAMOATE 25 MG/1
25 CAPSULE ORAL EVERY 4 HOURS PRN
Status: DISCONTINUED | OUTPATIENT
Start: 2018-12-21 | End: 2018-12-24 | Stop reason: HOSPADM

## 2018-12-21 RX ORDER — QUETIAPINE 300 MG/1
300 TABLET, FILM COATED, EXTENDED RELEASE ORAL NIGHTLY
Status: DISCONTINUED | OUTPATIENT
Start: 2018-12-21 | End: 2018-12-24 | Stop reason: HOSPADM

## 2018-12-21 RX ADMIN — METOPROLOL TARTRATE 12.5 MG: 25 TABLET ORAL at 21:06

## 2018-12-21 RX ADMIN — QUETIAPINE FUMARATE 300 MG: 300 TABLET, EXTENDED RELEASE ORAL at 21:06

## 2018-12-21 RX ADMIN — MULTIPLE VITAMINS W/ MINERALS TAB 1 TABLET: TAB at 10:07

## 2018-12-21 RX ADMIN — ONDANSETRON 4 MG: 2 INJECTION INTRAMUSCULAR; INTRAVENOUS at 03:19

## 2018-12-21 RX ADMIN — Medication 100 MG: at 10:07

## 2018-12-21 RX ADMIN — OXCARBAZEPINE 300 MG: 300 TABLET, FILM COATED ORAL at 11:40

## 2018-12-21 RX ADMIN — Medication: at 11:40

## 2018-12-21 RX ADMIN — CLONIDINE HYDROCHLORIDE 0.1 MG: 0.1 TABLET ORAL at 17:48

## 2018-12-21 RX ADMIN — CLONIDINE HYDROCHLORIDE 0.1 MG: 0.1 TABLET ORAL at 06:27

## 2018-12-21 RX ADMIN — CLONIDINE HYDROCHLORIDE 0.1 MG: 0.1 TABLET ORAL at 13:44

## 2018-12-21 RX ADMIN — METOPROLOL TARTRATE 12.5 MG: 25 TABLET ORAL at 11:40

## 2018-12-21 RX ADMIN — OXCARBAZEPINE 300 MG: 300 TABLET, FILM COATED ORAL at 21:06

## 2018-12-21 RX ADMIN — MIRTAZAPINE 7.5 MG: 15 TABLET, FILM COATED ORAL at 21:05

## 2018-12-21 RX ADMIN — CLONIDINE HYDROCHLORIDE 0.1 MG: 0.1 TABLET ORAL at 10:08

## 2018-12-21 RX ADMIN — NALOXONE HYDROCHLORIDE 2 MG: 1 INJECTION PARENTERAL at 03:15

## 2018-12-21 RX ADMIN — TAMSULOSIN HYDROCHLORIDE 0.4 MG: 0.4 CAPSULE ORAL at 11:41

## 2018-12-21 RX ADMIN — CLONIDINE HYDROCHLORIDE 0.1 MG: 0.1 TABLET ORAL at 22:35

## 2018-12-21 ASSESSMENT — PATIENT HEALTH QUESTIONNAIRE - PHQ9: SUM OF ALL RESPONSES TO PHQ QUESTIONS 1-9: 23

## 2018-12-21 ASSESSMENT — LIFESTYLE VARIABLES: HISTORY_ALCOHOL_USE: YES

## 2018-12-21 NOTE — H&P
Level of consciousness:  within normal limits   Appearance:  ill-appearing  Behavior/Motor:  psychomotor retardation  Attitude toward examiner:  withdrawn  Speech:  slow   Mood: decreased range and depressed  Affect:  mood congruent and anxious  Thought processes:  slow   Thought content:  Delusions:  paranoid  Perceptual Disturbance:  denies any perceptual disturbance  Cognition:  oriented to person, place, and time   Concentration intact  Memory intact  Mini Mental Status 30/30  Insight fair   Judgement poor   Fund of Knowledge adequate        DIAGNOSIS:      (Axis I):        Substance disorders:  Polysubstance abuse   Bipolar disorder Not Otherwise Specified        RISK ASSESSMENT:     SUICIDE: high             HOMICIDE: low  AGITATION/VIOLENCE: low  ELOPEMENT: low  LABS:  Recent Labs      12/20/18   2345   WBC  4.8   HGB  14.1   PLT  209     Recent Labs      12/20/18   2345   NA  140   K  4.0   CL  102   CO2  26   BUN  17   CREATININE  1.26*   GLUCOSE  73*     Recent Labs      12/20/18   2345   BILITOT  0.4   ALKPHOS  66   AST  81*   ALT  81*     Lab Results   Component Value Date    LABAMPH Neg 08/11/2018    BARBSCNU Neg 08/11/2018    LABBENZ Neg 08/11/2018    LABBENZ DTCD 09/14/2012    OPIATESCREENURINE POSITIVE 08/11/2018    PHENCYCLIDINESCREENURINE Neg 08/11/2018    ETOH <10 12/20/2018     Lab Results   Component Value Date    TSH 1.880 12/20/2018     Lab Results   Component Value Date    LITHIUM <0.1 (LL) 04/14/2014     Lab Results   Component Value Date    VALPROATE 8.3 (L) 02/06/2018     Lab Results   Component Value Date    LITHIUM <0.1 04/14/2014    VALPROATE 8.3 02/06/2018       Radiology  No results found. TREATMENT PLAN:    Risk Management:  close watch and suicide risk    Collateral Information:  Will obtain collateral information from the family or friends.   Will obtain medical records as appropriate from out patient providers  Will consult the hospitalist for a physical exam to rule out any

## 2018-12-21 NOTE — ED NOTES
Patient refused help from [employer] at this time after he had originally asked for help     Wale Manley RN  12/21/18 0088

## 2018-12-21 NOTE — H&P
[address] MEDICINE    HISTORY AND PHYSICAL EXAM    PATIENT NAME:  Janay [place]    MRN:  [id number]  SERVICE DATE:  12/21/2018   SERVICE TIME:  1:28 PM    Primary Care Physician: Yusra Huynh MD         SUBJECTIVE  CHIEF COMPLAINT:  Medical clear for inpatient psychiatry admission. Consult for medical H/P encounter. HPI:  This is a 60 y.o. male who is admitted to [address] [place] [address] for worsening symptoms of bipolar disorder for days pta and worries of alcohol abuse. Denies cp sob ha rash n v d f    PAST MEDICAL HISTORY:    Past Medical History:   Diagnosis Date    Abnormality of gait and mobility     Alcohol abuse 2014    Anxiety     Ataxia     CAD (coronary artery disease)     CHF (congestive heart failure) (HCC)     Chronic back pain     Cocaine abuse ([facility] 2014    Depression     Heroin abuse ([facility] 2014    History of hepatitis C     Hyperlipidemia     Hypertension     Late effects of CVA (cerebrovascular accident)     Leukocytosis     Myocardial infarct, old 2010    S/P CABG x 3     Tobacco abuse 2014    Transient ischemic attack      PAST SURGICAL HISTORY:    Past Surgical History:   Procedure Laterality Date    CORONARY ARTERY BYPASS GRAFT      EXCISION / BIOPSY SKIN LESION OF ARM N/A 4/8/2017    I&D DEBRIDEMENT NECROTIC WOUND ABSCESS LEFT MEDIAL ELBOW AND RIGHT FOREARM  performed by Watson Kerns MD at [address] [place] [address] ECHOCARDIOGRAM  5/15/2013     FAMILY HISTORY:    Family History   Problem Relation Age of Onset    Family history unknown: Yes     SOCIAL HISTORY:    Social History     Social History    Marital status:      Spouse name: N/A    Number of children: 1    Years of education: 15     Occupational History    Not on file.      Social History Main Topics    Smoking status: Current Every Day Smoker     Packs/day: 2.00     Years: 45.00     Types: Cigarettes    Smokeless tobacco: Current User    Alcohol use No      Comment: social    12/21/18 1140    QUEtiapine (SEROQUEL XR) extended release tablet 300 mg  300 mg Oral Nightly Paulina Henson MD        OXcarbazepine (TRILEPTAL) tablet 300 mg  300 mg Oral BID Paulina Henson MD   300 mg at 12/21/18 1140    mirtazapine (REMERON) tablet 7.5 mg  7.5 mg Oral Nightly Paulina Henson MD        tamsulosin (FLOMAX) capsule 0.4 mg  0.4 mg Oral Daily Paulina Henson MD   0.4 mg at 12/21/18 1141    LORazepam (ATIVAN) injection 0.5 mg  0.5 mg Intravenous Once Francia Miller MD   Stopped at 12/20/18 2340       ALLERGIES: Patient has no known allergies. REVIEW OF SYSTEM:   ROS as noted in HPI, 12 point ROS reviewed and otherwise negative. OBJECTIVE  PHYSICAL EXAM: BP (!) 145/82   Pulse 96   Temp 97.6 °F (36.4 °C) (Oral)   Resp 18   Ht 5' 10\" (1.778 m)   Wt 161 lb (73 kg)   SpO2 95%   BMI 23.10 kg/m²   CONSTITUTIONAL:  awake, alert, cooperative, no apparent distress, and appears stated age  EYES:  Lids and lashes normal, pupils equal, round and reactive to light, extra ocular muscles intact, sclera clear, conjunctiva normal  ENT:  Normocephalic, without obvious abnormality, atraumatic, sinuses nontender on palpation, external ears without lesions, oral pharynx with moist mucus membranes, tonsils without erythema or exudates, gums normal and good dentition. NECK:  Supple, symmetrical, trachea midline, no adenopathy, thyroid symmetric, not enlarged and no tenderness, skin normal  LUNGS:  No increased work of breathing, good air exchange, clear to auscultation bilaterally, no crackles or wheezing  CARDIOVASCULAR:  Normal apical impulse, regular rate and rhythm, normal S1 and S2, no S3 or S4, and no murmur noted  ABDOMEN:  No scars, normal bowel sounds, soft, non-distended, non-tender, no masses palpated, no hepatosplenomegally  MUSCULOSKELETAL:  There is no redness, warmth, or swelling of the joints. Full range of motion noted. Motor strength is 5 out of 5 all extremities bilaterally.

## 2018-12-21 NOTE — ED NOTES
Lab at bedside for blood. No distress noted from patient at this time. Patient aware of need for urine specimen.       Chelsea Pisano RN  12/21/18 0002       Chelsea Pisano RN  12/21/18 6936

## 2018-12-22 PROCEDURE — 1240000000 HC EMOTIONAL WELLNESS R&B

## 2018-12-22 PROCEDURE — 6370000000 HC RX 637 (ALT 250 FOR IP): Performed by: PSYCHIATRY & NEUROLOGY

## 2018-12-22 RX ADMIN — OXCARBAZEPINE 300 MG: 300 TABLET, FILM COATED ORAL at 20:20

## 2018-12-22 RX ADMIN — CLONIDINE HYDROCHLORIDE 0.1 MG: 0.1 TABLET ORAL at 18:13

## 2018-12-22 RX ADMIN — Medication 100 MG: at 08:53

## 2018-12-22 RX ADMIN — METOPROLOL TARTRATE 12.5 MG: 25 TABLET ORAL at 08:54

## 2018-12-22 RX ADMIN — CLONIDINE HYDROCHLORIDE 0.1 MG: 0.1 TABLET ORAL at 06:24

## 2018-12-22 RX ADMIN — METOPROLOL TARTRATE 12.5 MG: 25 TABLET ORAL at 20:21

## 2018-12-22 RX ADMIN — OXCARBAZEPINE 300 MG: 300 TABLET, FILM COATED ORAL at 08:53

## 2018-12-22 RX ADMIN — TAMSULOSIN HYDROCHLORIDE 0.4 MG: 0.4 CAPSULE ORAL at 08:53

## 2018-12-22 RX ADMIN — MIRTAZAPINE 7.5 MG: 15 TABLET, FILM COATED ORAL at 20:20

## 2018-12-22 RX ADMIN — QUETIAPINE FUMARATE 300 MG: 300 TABLET, EXTENDED RELEASE ORAL at 20:25

## 2018-12-22 RX ADMIN — MULTIPLE VITAMINS W/ MINERALS TAB 1 TABLET: TAB at 08:54

## 2018-12-22 RX ADMIN — CLONIDINE HYDROCHLORIDE 0.1 MG: 0.1 TABLET ORAL at 11:12

## 2018-12-22 RX ADMIN — CLONIDINE HYDROCHLORIDE 0.1 MG: 0.1 TABLET ORAL at 22:34

## 2018-12-22 RX ADMIN — CLONIDINE HYDROCHLORIDE 0.1 MG: 0.1 TABLET ORAL at 14:45

## 2018-12-22 ASSESSMENT — SLEEP AND FATIGUE QUESTIONNAIRES
DO YOU HAVE DIFFICULTY SLEEPING: NO
DO YOU USE A SLEEP AID: NO
AVERAGE NUMBER OF SLEEP HOURS: 8
SLEEP PATTERN: NORMAL

## 2018-12-23 PROCEDURE — 6370000000 HC RX 637 (ALT 250 FOR IP): Performed by: PSYCHIATRY & NEUROLOGY

## 2018-12-23 PROCEDURE — 1240000000 HC EMOTIONAL WELLNESS R&B

## 2018-12-23 RX ADMIN — CLONIDINE HYDROCHLORIDE 0.1 MG: 0.1 TABLET ORAL at 02:30

## 2018-12-23 RX ADMIN — METOPROLOL TARTRATE 12.5 MG: 25 TABLET ORAL at 08:32

## 2018-12-23 RX ADMIN — OXCARBAZEPINE 300 MG: 300 TABLET, FILM COATED ORAL at 08:32

## 2018-12-23 RX ADMIN — Medication 100 MG: at 08:31

## 2018-12-23 RX ADMIN — MIRTAZAPINE 7.5 MG: 15 TABLET, FILM COATED ORAL at 20:30

## 2018-12-23 RX ADMIN — QUETIAPINE FUMARATE 300 MG: 300 TABLET, EXTENDED RELEASE ORAL at 20:30

## 2018-12-23 RX ADMIN — METOPROLOL TARTRATE 12.5 MG: 25 TABLET ORAL at 20:30

## 2018-12-23 RX ADMIN — TAMSULOSIN HYDROCHLORIDE 0.4 MG: 0.4 CAPSULE ORAL at 08:31

## 2018-12-23 RX ADMIN — MULTIPLE VITAMINS W/ MINERALS TAB 1 TABLET: TAB at 08:32

## 2018-12-23 RX ADMIN — CLONIDINE HYDROCHLORIDE 0.1 MG: 0.1 TABLET ORAL at 18:31

## 2018-12-23 RX ADMIN — CLONIDINE HYDROCHLORIDE 0.1 MG: 0.1 TABLET ORAL at 12:19

## 2018-12-23 RX ADMIN — OXCARBAZEPINE 300 MG: 300 TABLET, FILM COATED ORAL at 20:30

## 2018-12-23 NOTE — FLOWSHEET NOTE
Patient visible on unit, nonsocial. Viewing TV. Reports feeling better today, states, \"I'm finally coming around. \" States he has more energy today, reports doing push ups for exercise in his room. Pleasant and cooperative. Denies w/d symptoms. Restlessness noted. Denies SI, HI, A/V hallucinations. Does not appear to be internally stimulated at time of assessment. Reports he is being discharged tomorrow, states he feels ready and \"I hope to be home for [place], that's all I care about. \" Discussed ability to return to [facility], reports he has no contacted them thus far.  Electronically signed by Efrem Coleman RN on 12/23/2018 at 4:36 PM

## 2018-12-24 VITALS
TEMPERATURE: 98 F | HEART RATE: 78 BPM | WEIGHT: 161 LBS | BODY MASS INDEX: 23.05 KG/M2 | OXYGEN SATURATION: 98 % | RESPIRATION RATE: 16 BRPM | HEIGHT: 70 IN | DIASTOLIC BLOOD PRESSURE: 80 MMHG | SYSTOLIC BLOOD PRESSURE: 134 MMHG

## 2018-12-24 LAB
ANION GAP SERPL CALCULATED.3IONS-SCNC: 13 MEQ/L (ref 7–13)
BUN BLDV-MCNC: 14 MG/DL (ref 6–20)
CALCIUM SERPL-MCNC: 8.9 MG/DL (ref 8.6–10.2)
CHLORIDE BLD-SCNC: 105 MEQ/L (ref 98–107)
CO2: 22 MEQ/L (ref 22–29)
CREAT SERPL-MCNC: 1.02 MG/DL (ref 0.7–1.2)
GFR AFRICAN AMERICAN: >60
GFR NON-AFRICAN AMERICAN: >60
GLUCOSE BLD-MCNC: 187 MG/DL (ref 74–109)
POTASSIUM SERPL-SCNC: 3.5 MEQ/L (ref 3.5–5.1)
SODIUM BLD-SCNC: 140 MEQ/L (ref 132–144)

## 2018-12-24 PROCEDURE — 99239 HOSP IP/OBS DSCHRG MGMT >30: CPT | Performed by: PSYCHIATRY & NEUROLOGY

## 2018-12-24 PROCEDURE — 6370000000 HC RX 637 (ALT 250 FOR IP): Performed by: PSYCHIATRY & NEUROLOGY

## 2018-12-24 PROCEDURE — 80048 BASIC METABOLIC PNL TOTAL CA: CPT

## 2018-12-24 PROCEDURE — 36415 COLL VENOUS BLD VENIPUNCTURE: CPT

## 2018-12-24 RX ADMIN — METOPROLOL TARTRATE 12.5 MG: 25 TABLET ORAL at 08:32

## 2018-12-24 RX ADMIN — OXCARBAZEPINE 300 MG: 300 TABLET, FILM COATED ORAL at 08:33

## 2018-12-24 RX ADMIN — TAMSULOSIN HYDROCHLORIDE 0.4 MG: 0.4 CAPSULE ORAL at 08:33

## 2018-12-24 RX ADMIN — Medication 100 MG: at 08:32

## 2018-12-24 RX ADMIN — CLONIDINE HYDROCHLORIDE 0.1 MG: 0.1 TABLET ORAL at 06:23

## 2018-12-24 RX ADMIN — CLONIDINE HYDROCHLORIDE 0.1 MG: 0.1 TABLET ORAL at 00:13

## 2018-12-24 RX ADMIN — MULTIPLE VITAMINS W/ MINERALS TAB 1 TABLET: TAB at 08:33

## 2018-12-24 NOTE — DISCHARGE SUMMARY
yes  Hx of Suicidal Attempts: yes  Hx of violence:  no  ECT: no  Previous discontinued Psychiatric Med Trials:       PAST MEDICAL/PSYCHIATRIC HISTORY:   Past Medical History:   Diagnosis Date    Abnormality of gait and mobility     Alcohol abuse 2014    Anxiety     Ataxia     CAD (coronary artery disease)     CHF (congestive heart failure) (HCC)     Chronic back pain     Cocaine abuse ([facility] 2014    Depression     Heroin abuse ([facility] 2014    History of hepatitis C     Hyperlipidemia     Hypertension     Late effects of CVA (cerebrovascular accident)     Leukocytosis     Myocardial infarct, old 2010    S/P CABG x 3     Tobacco abuse 2014    Transient ischemic attack        FAMILY/SOCIAL HISTORY:  Family History   Problem Relation Age of Onset    Family history unknown: Yes     Social History     Social History    Marital status:      Spouse name: N/A    Number of children: 1    Years of education: 15     Occupational History    Not on file. Social History Main Topics    Smoking status: Current Every Day Smoker     Packs/day: 2.00     Years: 45.00     Types: Cigarettes    Smokeless tobacco: Current User    Alcohol use No      Comment: social    Drug use: Yes     Frequency: 1.0 time per week     Types: Opiates , Cocaine, Other-see comments      Comment: heroin, crack, xanax    Sexual activity: Yes     Partners: Female     Other Topics Concern    Not on file     Social History Narrative    The patient lives with a friend in a two story home with bedrooms and bathrooms on both levels. His social support includes his friend. He has a walker and a cane at home. It is not indicated that he was receiving community services prior to admission. He wears eye glasses and upper dentures. He does not have history of falls prior to admission. He was independent with mobility and self care prior to admission. His goal is to feel better.        MEDICATIONS:    Current Facility-Administered Pain     LIPITOR 80 MG tablet  Generic drug:  atorvastatin     mirtazapine 15 MG tablet  Commonly known as:  REMERON     OXcarbazepine 300 MG tablet  Commonly known as:  TRILEPTAL  Take 1 tablet by mouth 2 times daily     SUBOXONE 8-2 MG Subl SL tablet  Generic drug:  buprenorphine-naloxone     tamsulosin 0.4 MG capsule  Commonly known as:  FLOMAX     Vitamin D3 2000 units Caps        STOP taking these medications    citalopram 40 MG tablet  Commonly known as:  CELEXA     ketorolac 10 MG tablet  Commonly known as:  TORADOL     meloxicam 15 MG tablet  Commonly known as:  MOBIC     traZODone 50 MG tablet  Commonly known as:  DESYREL        ASK your doctor about these medications    clopidogrel 75 MG tablet  Commonly known as:  PLAVIX     lisinopril 2.5 MG tablet  Commonly known as:  PRINIVIL;ZESTRIL  Take 1 tablet by mouth daily              TIME SPEND - 35 MINUTES TO COMPLETE THE EVALUATION, DISCHARGE SUMMARY, MEDICATION RECONCILIATION AND FOLLOW UP CARE     Chris Rojas  12/24/2018  9:00 AM

## 2018-12-24 NOTE — PROGRESS NOTES
[address] [facility] FOLLOW-UP NOTE     12/22/2018     Patient was seen and examined in person, Chart reviewed   Patient's case discussed with staff/team    Chief Complaint: depression, addiction    Interim History:     Pt has been doing better  Less depressed  Minimal withdrawal symptoms  Slept better last night  Minimizing all his symptoms  Compliant with treatment  Appetite:   [x] Normal/Unchanged  [] Increased  [] Decreased      Sleep:       [] Normal/Unchanged  [x] Fair       [] Poor              Energy:    [] Normal/Unchanged  [] Increased  [x] Decreased        SI [] Present  [x] Absent    HI  []Present  [x] Absent     Aggression:  [] yes  [x] no    Patient is [] able  [] unable to CONTRACT FOR SAFETY     PAST MEDICAL/PSYCHIATRIC HISTORY:   Past Medical History:   Diagnosis Date    Abnormality of gait and mobility     Alcohol abuse 2014    Anxiety     Ataxia     CAD (coronary artery disease)     CHF (congestive heart failure) (HCC)     Chronic back pain     Cocaine abuse ([facility] 2014    Depression     Heroin abuse ([facility] 2014    History of hepatitis C     Hyperlipidemia     Hypertension     Late effects of CVA (cerebrovascular accident)     Leukocytosis     Myocardial infarct, old 2010    S/P CABG x 3     Tobacco abuse 2014    Transient ischemic attack        FAMILY/SOCIAL HISTORY:  Family History   Problem Relation Age of Onset    Family history unknown: Yes     Social History     Social History    Marital status:      Spouse name: N/A    Number of children: 1    Years of education: 15     Occupational History    Not on file.      Social History Main Topics    Smoking status: Current Every Day Smoker     Packs/day: 2.00     Years: 45.00     Types: Cigarettes    Smokeless tobacco: Current User    Alcohol use No      Comment: social    Drug use: Yes     Frequency: 1.0 time per week     Types: Opiates , Cocaine, Other-see comments      Comment: heroin, crack, xanax    Sexual
Pt. refused to attend the 0900 community meeting due to resting in room, despite staff encouragement. Electronically signed by Yareli Fuentes, [address] on 12/24/2018 at 9:38 AM
Pt. refused to attend the 1000 skills group, despite staff encouragement.  Electronically signed by Rex Rivera on 12/22/2018 at 12:10 PM
Topics Concern    Not on file     Social History Narrative    The patient lives with a friend in a two story home with bedrooms and bathrooms on both levels. His social support includes his friend. He has a walker and a cane at home. It is not indicated that he was receiving community services prior to admission. He wears eye glasses and upper dentures. He does not have history of falls prior to admission. He was independent with mobility and self care prior to admission. His goal is to feel better. ROS:  [x] All negative/unchanged except if checked.  Explain positive(checked items) below:  [] Constitutional  [] Eyes  [] Ear/Nose/Mouth/Throat  [] Respiratory  [] CV  [] GI  []   [] Musculoskeletal  [] Skin/Breast  [] Neurological  [] Endocrine  [] Heme/Lymph  [] Allergic/Immunologic    Explanation:     MEDICATIONS:    Current Facility-Administered Medications:     acetaminophen (TYLENOL) tablet 650 mg, 650 mg, Oral, Q4H PRN, Migdalia Levy MD    aluminum & magnesium hydroxide-simethicone (MAALOX) 200-200-20 MG/5ML suspension 30 mL, 30 mL, Oral, BID PRN, Migdalia Levy MD    therapeutic multivitamin-minerals 1 tablet, 1 tablet, Oral, Daily, Migdalia Levy MD, 1 tablet at 18 4621    [] cloNIDine (CATAPRES) tablet 0.1 mg, 0.1 mg, Oral, Q4H, 0.1 mg at 18 0230 **FOLLOWED BY** [] cloNIDine (CATAPRES) tablet 0.1 mg, 0.1 mg, Oral, Q6H, 0.1 mg at 18 0013 **FOLLOWED BY** cloNIDine (CATAPRES) tablet 0.1 mg, 0.1 mg, Oral, Q8H, Migdalia Levy MD, 0.1 mg at 18 3227    hydrOXYzine (VISTARIL) capsule 25 mg, 25 mg, Oral, Q4H PRN, Migdalia Levy MD    traZODone (DESYREL) tablet 50 mg, 50 mg, Oral, Nightly PRN, Migdalia Levy MD    loperamide (IMODIUM) capsule 2 mg, 2 mg, Oral, TID PRN, Migdalia Levy MD    vitamin B-1 (THIAMINE) tablet 100 mg, 100 mg, Oral, Daily, Migdalia Levy MD, 100 mg at 18 0667    metoprolol tartrate (LOPRESSOR) tablet 12.5 mg, 12.5

## 2018-12-25 ENCOUNTER — APPOINTMENT (OUTPATIENT)
Dept: GENERAL RADIOLOGY | Age: 58
End: 2018-12-25
Payer: COMMERCIAL

## 2018-12-25 ENCOUNTER — HOSPITAL ENCOUNTER (EMERGENCY)
Age: 58
Discharge: HOME | End: 2018-12-25
Payer: COMMERCIAL

## 2018-12-25 VITALS
HEIGHT: 70 IN | RESPIRATION RATE: 18 BRPM | SYSTOLIC BLOOD PRESSURE: 165 MMHG | DIASTOLIC BLOOD PRESSURE: 91 MMHG | TEMPERATURE: 98.8 F | OXYGEN SATURATION: 97 % | HEART RATE: 80 BPM | BODY MASS INDEX: 24.34 KG/M2 | WEIGHT: 170 LBS
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
## 2018-12-25 LAB
ALBUMIN SERPL-MCNC: 4.3 G/DL (ref 3.9–4.9)
ALP BLD-CCNC: 61 U/L (ref 35–104)
ALT SERPL-CCNC: 61 U/L (ref 0–41)
ANION GAP SERPL CALCULATED.3IONS-SCNC: 12 MEQ/L (ref 7–13)
AST SERPL-CCNC: 51 U/L (ref 0–40)
BASOPHILS ABSOLUTE: 0.1 K/UL (ref 0–0.2)
BASOPHILS RELATIVE PERCENT: 1 %
BILIRUB SERPL-MCNC: 0.4 MG/DL (ref 0–1.2)
BUN BLDV-MCNC: 14 MG/DL (ref 6–20)
CALCIUM SERPL-MCNC: 9.1 MG/DL (ref 8.6–10.2)
CHLORIDE BLD-SCNC: 100 MEQ/L (ref 98–107)
CO2: 28 MEQ/L (ref 22–29)
CREAT SERPL-MCNC: 1.15 MG/DL (ref 0.7–1.2)
EKG ATRIAL RATE: 78 BPM
EKG P AXIS: 59 DEGREES
EKG P-R INTERVAL: 134 MS
EKG Q-T INTERVAL: 412 MS
EKG QRS DURATION: 134 MS
EKG QTC CALCULATION (BAZETT): 469 MS
EKG R AXIS: 33 DEGREES
EKG T AXIS: -9 DEGREES
EKG VENTRICULAR RATE: 78 BPM
EOSINOPHILS ABSOLUTE: 0.1 K/UL (ref 0–0.7)
EOSINOPHILS RELATIVE PERCENT: 1.7 %
ETHANOL PERCENT: NORMAL G/DL
ETHANOL: <10 MG/DL (ref 0–0.08)
GFR AFRICAN AMERICAN: >60
GFR NON-AFRICAN AMERICAN: >60
GLOBULIN: 4.1 G/DL (ref 2.3–3.5)
GLUCOSE BLD-MCNC: 121 MG/DL (ref 74–109)
HCT VFR BLD CALC: 43.6 % (ref 42–52)
HEMOGLOBIN: 15.2 G/DL (ref 14–18)
LYMPHOCYTES ABSOLUTE: 1.2 K/UL (ref 1–4.8)
LYMPHOCYTES RELATIVE PERCENT: 18.8 %
MCH RBC QN AUTO: 33 PG (ref 27–31.3)
MCHC RBC AUTO-ENTMCNC: 34.8 % (ref 33–37)
MCV RBC AUTO: 94.8 FL (ref 80–100)
MONOCYTES ABSOLUTE: 0.7 K/UL (ref 0.2–0.8)
MONOCYTES RELATIVE PERCENT: 10.5 %
NEUTROPHILS ABSOLUTE: 4.5 K/UL (ref 1.4–6.5)
NEUTROPHILS RELATIVE PERCENT: 68 %
PDW BLD-RTO: 15.1 % (ref 11.5–14.5)
PLATELET # BLD: 239 K/UL (ref 130–400)
POTASSIUM SERPL-SCNC: 4.3 MEQ/L (ref 3.5–5.1)
RBC # BLD: 4.6 M/UL (ref 4.7–6.1)
SODIUM BLD-SCNC: 140 MEQ/L (ref 132–144)
TOTAL PROTEIN: 8.4 G/DL (ref 6.4–8.1)
TROPONIN: <0.01 NG/ML (ref 0–0.01)
WBC # BLD: 6.6 K/UL (ref 4.8–10.8)

## 2018-12-25 PROCEDURE — 85025 COMPLETE CBC W/AUTO DIFF WBC: CPT

## 2018-12-25 PROCEDURE — 93005 ELECTROCARDIOGRAM TRACING: CPT

## 2018-12-25 PROCEDURE — 80053 COMPREHEN METABOLIC PANEL: CPT

## 2018-12-25 PROCEDURE — 84484 ASSAY OF TROPONIN QUANT: CPT

## 2018-12-25 PROCEDURE — G0480 DRUG TEST DEF 1-7 CLASSES: HCPCS

## 2018-12-25 PROCEDURE — 71045 X-RAY EXAM CHEST 1 VIEW: CPT

## 2018-12-25 PROCEDURE — 36415 COLL VENOUS BLD VENIPUNCTURE: CPT

## 2018-12-25 PROCEDURE — 99285 EMERGENCY DEPT VISIT HI MDM: CPT

## 2018-12-25 ASSESSMENT — ENCOUNTER SYMPTOMS
ABDOMINAL PAIN: 0
BLOOD IN STOOL: 0
SHORTNESS OF BREATH: 0
VOMITING: 0
DIARRHEA: 0
COUGH: 0
SORE THROAT: 0
NAUSEA: 0
RHINORRHEA: 0
COLOR CHANGE: 0

## 2018-12-25 NOTE — ED NOTES
Bed: 02  Expected date: 12/25/18  Expected time:   Means of arrival:   Comments:  70 male, alert and oriented,lethargic,hx. Of heroin abuse. pupils dilated.      Olga Reynolds RN  12/25/18 2120

## 2018-12-25 NOTE — ED PROVIDER NOTES
negative. PAST MEDICAL HISTORY     Past Medical History:   Diagnosis Date    Abnormality of gait and mobility     Alcohol abuse 2014    Anxiety     Ataxia     CAD (coronary artery disease)     CHF (congestive heart failure) (HCC)     Chronic back pain     Cocaine abuse ([facility] 2014    Depression     Heroin abuse ([facility] 2014    History of hepatitis C     Hyperlipidemia     Hypertension     Late effects of CVA (cerebrovascular accident)     Leukocytosis     Myocardial infarct, old 2010    S/P CABG x 3     Tobacco abuse 2014    Transient ischemic attack          SURGICAL HISTORY       Past Surgical History:   Procedure Laterality Date    CORONARY ARTERY BYPASS GRAFT      EXCISION / BIOPSY SKIN LESION OF ARM N/A 4/8/2017    I&D DEBRIDEMENT NECROTIC WOUND ABSCESS LEFT MEDIAL ELBOW AND RIGHT FOREARM  performed by Julius Chappell MD at [facility] ECHOCARDIOGRAM  5/15/2013         CURRENT MEDICATIONS       Previous Medications    ALBUTEROL (PROAIR HFA) 108 (90 BASE) MCG/ACT INHALER    Inhale 2 puffs into the lungs every 6 hours as needed for Wheezing. ASPIRIN 81 MG TABLET    Take 81 mg by mouth daily    ATORVASTATIN (LIPITOR) 80 MG TABLET    Take 80 mg by mouth nightly    BUPRENORPHINE-NALOXONE (SUBOXONE) 8-2 MG SUBL SL TABLET    Place 1 tablet under the tongue 2 times daily .     CHOLECALCIFEROL (VITAMIN D3) 2000 UNITS CAPS    Take 2,000 Units by mouth daily    CLOPIDOGREL (PLAVIX) 75 MG TABLET    Take 75 mg by mouth daily    HYDROXYZINE (VISTARIL) 50 MG CAPSULE    Take 50 mg by mouth 2 times daily    IBUPROFEN (ADVIL;MOTRIN) 800 MG TABLET    Take 1 tablet by mouth every 8 hours as needed for Pain    LISINOPRIL (PRINIVIL;ZESTRIL) 2.5 MG TABLET    Take 1 tablet by mouth daily    METOPROLOL TARTRATE (LOPRESSOR) 25 MG TABLET    Take 0.5 tablets by mouth 2 times daily    MIRTAZAPINE (REMERON) 15 MG TABLET    Take 7.5 mg by mouth nightly    OXCARBAZEPINE (TRILEPTAL) 300 MG TABLET

## 2018-12-26 ENCOUNTER — HOSPITAL ENCOUNTER (EMERGENCY)
Age: 58
Discharge: LEFT BEFORE DISCHARGE | End: 2018-12-26
Payer: COMMERCIAL

## 2018-12-26 VITALS
RESPIRATION RATE: 15 BRPM | BODY MASS INDEX: 24.34 KG/M2 | HEART RATE: 86 BPM | WEIGHT: 170 LBS | HEIGHT: 70 IN | DIASTOLIC BLOOD PRESSURE: 82 MMHG | TEMPERATURE: 99.8 F | SYSTOLIC BLOOD PRESSURE: 119 MMHG | OXYGEN SATURATION: 96 %

## 2018-12-26 DIAGNOSIS — R40.0 SLEEPINESS: Primary | ICD-10-CM

## 2018-12-26 DIAGNOSIS — F11.90 HEROIN USE: ICD-10-CM

## 2018-12-26 PROCEDURE — 93010 ELECTROCARDIOGRAM REPORT: CPT | Performed by: INTERNAL MEDICINE

## 2018-12-26 PROCEDURE — 99284 EMERGENCY DEPT VISIT MOD MDM: CPT

## 2018-12-26 RX ORDER — NALOXONE HYDROCHLORIDE 1 MG/ML
2 INJECTION INTRAMUSCULAR; INTRAVENOUS; SUBCUTANEOUS ONCE
Status: DISCONTINUED | OUTPATIENT
Start: 2018-12-26 | End: 2018-12-26 | Stop reason: HOSPADM

## 2018-12-26 ASSESSMENT — ENCOUNTER SYMPTOMS
DIARRHEA: 0
PHOTOPHOBIA: 0
BACK PAIN: 0
EYE PAIN: 0
VOMITING: 0
COUGH: 0
RHINORRHEA: 0
SORE THROAT: 0
NAUSEA: 0
ABDOMINAL PAIN: 0
SHORTNESS OF BREATH: 0

## 2018-12-26 NOTE — ED PROVIDER NOTES
[facility] ED  eMERGENCY dEPARTMENT eNCOUnter      Pt Name: Jennifer Gramajo  MRN: [id number]  [place] 1960  Date of evaluation: 12/26/2018  Provider: Amisha Noriega       Chief Complaint   Patient presents with    Altered Mental Status         HISTORY OF PRESENT ILLNESS   (Location/Symptom, Timing/Onset,Context/Setting, Quality, Duration, Modifying Factors, Severity)  Note limiting factors. Jennifer Gramajo is a 60 y.o. male who [facility] the emergency department [facility] the homeless shelter after being very sleepy this morning. He arrives tired, fatigued but oriented x4. He admits to recent heroin but denies any this am.  He states it takes him long time to wake up due to his seroquel. Location/Symptom - sleepy  Timing/Onset - today  Context/Setting - after awaking from homeless shelter  Quality - sleepy  Duration - an hour  Modifying Factors - unknown,he admits to heroin use. Severity - mild    Nursing Notes were reviewed. REVIEW OF SYSTEMS    (2-9 systems for level 4, 10 or more for level 5)     Review of Systems   Constitutional: Positive for fatigue. Negative for chills, diaphoresis and fever. HENT: Negative for congestion, rhinorrhea and sore throat. Eyes: Negative for photophobia and pain. Respiratory: Negative for cough and shortness of breath. Cardiovascular: Negative for chest pain and palpitations. Gastrointestinal: Negative for abdominal pain, diarrhea, nausea and vomiting. Genitourinary: Negative for dysuria and flank pain. Musculoskeletal: Negative for back pain. Skin: Negative for rash. Neurological: Negative for dizziness, light-headedness and headaches. Except as noted above the remainder of the review of systems was reviewed and negative.        PAST MEDICAL HISTORY     Past Medical History:   Diagnosis Date    Abnormality of gait and mobility     Alcohol abuse 2014    Anxiety     Ataxia     @FLOW([id number])@      PHYSICAL EXAM    (up to 7 for level 4, 8 or more for level 5)     ED Triage Vitals [12/26/18 0850]   BP Temp Temp Source Pulse Resp SpO2 Height Weight   119/82 99.8 °F (37.7 °C) Oral 86 15 96 % 5' 10\" (1.778 m) 170 lb (77.1 kg)       Physical Exam   Constitutional: He is oriented to person, place, and time. He appears well-developed and well-nourished. He is active. No distress. HENT:   Head: Normocephalic and atraumatic. Mouth/Throat: Mucous membranes are normal.   Neck: Normal range of motion. Neck supple. Cardiovascular: Normal rate, regular rhythm, normal heart sounds, intact distal pulses and normal pulses. Pulmonary/Chest: Effort normal and breath sounds normal.   Abdominal: Soft. Normal appearance and bowel sounds are normal. There is no tenderness. Neurological: He is alert and oriented to person, place, and time. He has normal strength. Skin: Skin is warm, dry and intact. No rash noted. He is not diaphoretic. Nursing note and vitals reviewed. DIAGNOSTIC RESULTS     EKG: All EKG's are interpreted by the Emergency Department Physician who either signs or Co-signsthis chart in the absence of a cardiologist.        RADIOLOGY:   Tay Bolder such as CT, Ultrasound and MRI are read by the radiologist. Plain radiographic images are visualized and preliminarily interpreted by the emergency physician with the below findings:        Interpretation per the Radiologist below, if available at the time ofthis note:    No orders to display         ED BEDSIDE ULTRASOUND:   Performed by ED Physician - none    LABS:  Labs Reviewed - No data to display    All other labs were within normal range or not returned as of this dictation.     EMERGENCY DEPARTMENT COURSE and DIFFERENTIAL DIAGNOSIS/MDM:   Vitals:    Vitals:    12/26/18 0850   BP: 119/82   Pulse: 86   Resp: 15   Temp: 99.8 °F (37.7 °C)   TempSrc: Oral   SpO2: 96%   Weight: 170 lb (77.1 kg)   Height: 5' 10\" (1.778 m) MDM on exam he appears tired but nontoxic and in no distress. VS normal.  He does not want to be in the ER due to upcoming preadmission testing today at 10am.  He wishes to sign out against advice. Risks up to and including death discussed and pt verbalizes good understanding. Standard anticipatory guidance given to patient upon discharge. Have given them a specific time frame in which to follow-up and who to follow-up with. I have also advised them that they should return to the emergency department if they get worse, or not getting better or develop any new or concerning symptoms. Patient demonstrates understanding and all questions were answered. CRITICAL CARE TIME       CONSULTS:  None    PROCEDURES:  Unless otherwise noted below, none     Procedures    FINAL IMPRESSION      1. Sleepiness    2.  Heroin use          DISPOSITION/PLAN   DISPOSITION [facility] 12/26/2018 09:10:15 AM      PATIENT REFERRED TO:  Portia Gamboa MD  [address] [place] [address]  [phone]    Call today  For continued evaluation and management      DISCHARGE MEDICATIONS:  Discharge Medication List as of 12/26/2018  9:18 AM             (Please notethat portions of this note were completed with a voice recognition program.  Efforts were made to edit the dictations but occasionally words are mis-transcribed.)    JASON Matt CNP (electronically signed)  Attending Emergency Physician          JASON Matt CNP  12/26/18 3682

## 2019-01-01 ENCOUNTER — ANESTHESIA (OUTPATIENT)
Dept: OPERATING ROOM | Age: 59
End: 2019-01-01
Payer: COMMERCIAL

## 2019-01-01 ENCOUNTER — ANESTHESIA EVENT (OUTPATIENT)
Dept: OPERATING ROOM | Age: 59
End: 2019-01-01
Payer: COMMERCIAL

## 2019-01-01 ENCOUNTER — HOSPITAL ENCOUNTER (INPATIENT)
Age: 59
LOS: 5 days | Discharge: NURSING, REHAB OR LONG-TERM CARE | DRG: 301 | End: 2019-08-06
Attending: ORTHOPAEDIC SURGERY | Admitting: ORTHOPAEDIC SURGERY
Payer: COMMERCIAL

## 2019-01-01 ENCOUNTER — HOSPITAL ENCOUNTER (EMERGENCY)
Age: 59
Discharge: HOME | End: 2019-08-16
Payer: COMMERCIAL

## 2019-01-01 ENCOUNTER — ANESTHESIA EVENT (OUTPATIENT)
Dept: OPERATING ROOM | Age: 59
DRG: 301 | End: 2019-01-01
Payer: COMMERCIAL

## 2019-01-01 ENCOUNTER — APPOINTMENT (OUTPATIENT)
Dept: GENERAL RADIOLOGY | Age: 59
DRG: 301 | End: 2019-01-01
Payer: COMMERCIAL

## 2019-01-01 ENCOUNTER — HOSPITAL ENCOUNTER (EMERGENCY)
Age: 59
Discharge: HOME | End: 2019-04-01
Payer: COMMERCIAL

## 2019-01-01 ENCOUNTER — APPOINTMENT (OUTPATIENT)
Dept: GENERAL RADIOLOGY | Age: 59
DRG: 301 | End: 2019-01-01
Attending: PHYSICAL MEDICINE & REHABILITATION
Payer: COMMERCIAL

## 2019-01-01 ENCOUNTER — APPOINTMENT (OUTPATIENT)
Dept: CT IMAGING | Age: 59
DRG: 301 | End: 2019-01-01
Payer: COMMERCIAL

## 2019-01-01 ENCOUNTER — OFFICE VISIT (OUTPATIENT)
Dept: SURGERY | Age: 59
End: 2019-01-01
Payer: COMMERCIAL

## 2019-01-01 ENCOUNTER — ANESTHESIA (OUTPATIENT)
Dept: OPERATING ROOM | Age: 59
DRG: 301 | End: 2019-01-01
Payer: COMMERCIAL

## 2019-01-01 ENCOUNTER — HOSPITAL ENCOUNTER (OUTPATIENT)
Age: 59
Setting detail: OUTPATIENT SURGERY
Discharge: HOME | End: 2019-02-05
Attending: SURGERY | Admitting: SURGERY
Payer: COMMERCIAL

## 2019-01-01 ENCOUNTER — HOSPITAL ENCOUNTER (INPATIENT)
Age: 59
LOS: 6 days | Discharge: LEFT BEFORE DISCHARGE | DRG: 301 | End: 2019-08-12
Attending: PHYSICAL MEDICINE & REHABILITATION | Admitting: PHYSICAL MEDICINE & REHABILITATION
Payer: COMMERCIAL

## 2019-01-01 ENCOUNTER — APPOINTMENT (OUTPATIENT)
Dept: CT IMAGING | Age: 59
End: 2019-01-01
Payer: COMMERCIAL

## 2019-01-01 VITALS
RESPIRATION RATE: 13 BRPM | SYSTOLIC BLOOD PRESSURE: 136 MMHG | WEIGHT: 156 LBS | HEIGHT: 70 IN | DIASTOLIC BLOOD PRESSURE: 69 MMHG | TEMPERATURE: 98.5 F | OXYGEN SATURATION: 100 % | BODY MASS INDEX: 22.33 KG/M2 | HEART RATE: 104 BPM

## 2019-01-01 VITALS
OXYGEN SATURATION: 99 % | BODY MASS INDEX: 23.7 KG/M2 | RESPIRATION RATE: 18 BRPM | HEART RATE: 76 BPM | DIASTOLIC BLOOD PRESSURE: 80 MMHG | SYSTOLIC BLOOD PRESSURE: 170 MMHG | TEMPERATURE: 97.5 F | WEIGHT: 160 LBS | HEIGHT: 69 IN

## 2019-01-01 VITALS
HEIGHT: 70 IN | SYSTOLIC BLOOD PRESSURE: 135 MMHG | RESPIRATION RATE: 18 BRPM | WEIGHT: 160 LBS | BODY MASS INDEX: 22.9 KG/M2 | HEART RATE: 84 BPM | OXYGEN SATURATION: 96 % | TEMPERATURE: 98 F | DIASTOLIC BLOOD PRESSURE: 67 MMHG

## 2019-01-01 VITALS
WEIGHT: 166 LBS | SYSTOLIC BLOOD PRESSURE: 138 MMHG | BODY MASS INDEX: 24.59 KG/M2 | HEIGHT: 69 IN | DIASTOLIC BLOOD PRESSURE: 88 MMHG | TEMPERATURE: 97.9 F

## 2019-01-01 VITALS
TEMPERATURE: 96.3 F | OXYGEN SATURATION: 100 % | SYSTOLIC BLOOD PRESSURE: 121 MMHG | DIASTOLIC BLOOD PRESSURE: 63 MMHG | RESPIRATION RATE: 11 BRPM

## 2019-01-01 VITALS
WEIGHT: 167 LBS | BODY MASS INDEX: 24.66 KG/M2 | OXYGEN SATURATION: 97 % | SYSTOLIC BLOOD PRESSURE: 108 MMHG | RESPIRATION RATE: 16 BRPM | HEART RATE: 70 BPM | TEMPERATURE: 98.5 F | DIASTOLIC BLOOD PRESSURE: 58 MMHG

## 2019-01-01 VITALS
OXYGEN SATURATION: 100 % | WEIGHT: 149.25 LBS | HEIGHT: 70 IN | DIASTOLIC BLOOD PRESSURE: 83 MMHG | TEMPERATURE: 97.7 F | RESPIRATION RATE: 18 BRPM | HEART RATE: 81 BPM | BODY MASS INDEX: 21.37 KG/M2 | SYSTOLIC BLOOD PRESSURE: 148 MMHG

## 2019-01-01 VITALS — DIASTOLIC BLOOD PRESSURE: 61 MMHG | SYSTOLIC BLOOD PRESSURE: 95 MMHG | OXYGEN SATURATION: 100 % | TEMPERATURE: 95.4 F

## 2019-01-01 DIAGNOSIS — S72.002A CLOSED FRACTURE OF NECK OF LEFT FEMUR, INITIAL ENCOUNTER (HCC): Primary | ICD-10-CM

## 2019-01-01 DIAGNOSIS — F19.10 POLYSUBSTANCE ABUSE (HCC): ICD-10-CM

## 2019-01-01 DIAGNOSIS — R10.84 GENERALIZED ABDOMINAL PAIN: Primary | ICD-10-CM

## 2019-01-01 DIAGNOSIS — K40.90 RIGHT INGUINAL HERNIA: Primary | ICD-10-CM
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
## 2019-01-01 DIAGNOSIS — K42.9 UMBILICAL HERNIA WITHOUT OBSTRUCTION AND WITHOUT GANGRENE: ICD-10-CM

## 2019-01-01 DIAGNOSIS — M25.552 LEFT HIP PAIN: Primary | ICD-10-CM

## 2019-01-01 LAB
ABO/RH: NORMAL
ALBUMIN SERPL-MCNC: 3.5 G/DL (ref 3.5–4.6)
ALBUMIN SERPL-MCNC: 3.5 G/DL (ref 3.5–4.6)
ALBUMIN SERPL-MCNC: 4 G/DL (ref 3.5–4.6)
ALP BLD-CCNC: 122 U/L (ref 35–104)
ALP BLD-CCNC: 67 U/L (ref 35–104)
ALP BLD-CCNC: 70 U/L (ref 35–104)
ALT SERPL-CCNC: 63 U/L (ref 0–41)
ALT SERPL-CCNC: 68 U/L (ref 0–41)
ALT SERPL-CCNC: 75 U/L (ref 0–41)
AMPHETAMINE SCREEN, URINE: POSITIVE
ANION GAP SERPL CALCULATED.3IONS-SCNC: 10 MEQ/L (ref 9–15)
ANION GAP SERPL CALCULATED.3IONS-SCNC: 10 MEQ/L (ref 9–15)
ANION GAP SERPL CALCULATED.3IONS-SCNC: 11 MEQ/L (ref 7–13)
ANION GAP SERPL CALCULATED.3IONS-SCNC: 11 MEQ/L (ref 9–15)
ANION GAP SERPL CALCULATED.3IONS-SCNC: 12 MEQ/L (ref 9–15)
ANION GAP SERPL CALCULATED.3IONS-SCNC: 15 MEQ/L (ref 9–15)
ANTIBODY SCREEN: NORMAL
APTT: 27.5 SEC (ref 24.4–36.8)
AST SERPL-CCNC: 51 U/L (ref 0–40)
AST SERPL-CCNC: 66 U/L (ref 0–40)
AST SERPL-CCNC: 66 U/L (ref 0–40)
BARBITURATE SCREEN URINE: ABNORMAL
BASOPHILS ABSOLUTE: 0 K/UL (ref 0–0.2)
BASOPHILS ABSOLUTE: 0.1 K/UL (ref 0–0.2)
BASOPHILS RELATIVE PERCENT: 0.4 %
BASOPHILS RELATIVE PERCENT: 0.6 %
BENZODIAZEPINE SCREEN, URINE: ABNORMAL
BILIRUB SERPL-MCNC: 0.5 MG/DL (ref 0.2–0.7)
BILIRUB SERPL-MCNC: 0.7 MG/DL (ref 0.2–0.7)
BILIRUB SERPL-MCNC: 0.9 MG/DL (ref 0.2–0.7)
BILIRUBIN DIRECT: 0.3 MG/DL (ref 0–0.4)
BILIRUBIN URINE: NEGATIVE
BILIRUBIN URINE: NEGATIVE
BILIRUBIN, INDIRECT: 0.6 MG/DL (ref 0–0.6)
BLOOD, URINE: NEGATIVE
BLOOD, URINE: NEGATIVE
BUN BLDV-MCNC: 13 MG/DL (ref 8–23)
BUN BLDV-MCNC: 14 MG/DL (ref 6–20)
BUN BLDV-MCNC: 14 MG/DL (ref 8–23)
BUN BLDV-MCNC: 15 MG/DL (ref 8–23)
BUN BLDV-MCNC: 15 MG/DL (ref 8–23)
BUN BLDV-MCNC: 18 MG/DL (ref 8–23)
BUN BLDV-MCNC: 19 MG/DL (ref 8–23)
BUN BLDV-MCNC: 27 MG/DL (ref 6–20)
CALCIUM SERPL-MCNC: 8.2 MG/DL (ref 8.5–9.9)
CALCIUM SERPL-MCNC: 8.3 MG/DL (ref 8.5–9.9)
CALCIUM SERPL-MCNC: 8.4 MG/DL (ref 8.5–9.9)
CALCIUM SERPL-MCNC: 8.5 MG/DL (ref 8.5–9.9)
CALCIUM SERPL-MCNC: 8.6 MG/DL (ref 8.5–9.9)
CALCIUM SERPL-MCNC: 8.9 MG/DL (ref 8.5–9.9)
CALCIUM SERPL-MCNC: 9 MG/DL (ref 8.5–9.9)
CALCIUM SERPL-MCNC: 9.1 MG/DL (ref 8.6–10.2)
CANNABINOID SCREEN URINE: POSITIVE
CHLORIDE BLD-SCNC: 100 MEQ/L (ref 95–107)
CHLORIDE BLD-SCNC: 102 MEQ/L (ref 95–107)
CHLORIDE BLD-SCNC: 105 MEQ/L (ref 95–107)
CHLORIDE BLD-SCNC: 105 MEQ/L (ref 98–107)
CHLORIDE BLD-SCNC: 107 MEQ/L (ref 95–107)
CHLORIDE BLD-SCNC: 99 MEQ/L (ref 95–107)
CLARITY: CLEAR
CLARITY: CLEAR
CO2: 19 MEQ/L (ref 20–31)
CO2: 20 MEQ/L (ref 20–31)
CO2: 23 MEQ/L (ref 20–31)
CO2: 24 MEQ/L (ref 20–31)
CO2: 24 MEQ/L (ref 22–29)
CO2: 27 MEQ/L (ref 20–31)
COCAINE METABOLITE SCREEN URINE: POSITIVE
COLOR: YELLOW
COLOR: YELLOW
CREAT SERPL-MCNC: 0.74 MG/DL (ref 0.7–1.2)
CREAT SERPL-MCNC: 0.76 MG/DL (ref 0.7–1.2)
CREAT SERPL-MCNC: 0.78 MG/DL (ref 0.7–1.2)
CREAT SERPL-MCNC: 0.81 MG/DL (ref 0.7–1.2)
CREAT SERPL-MCNC: 0.88 MG/DL (ref 0.7–1.2)
CREAT SERPL-MCNC: 0.95 MG/DL (ref 0.7–1.2)
CREAT SERPL-MCNC: 0.99 MG/DL (ref 0.7–1.2)
CREAT SERPL-MCNC: 1.21 MG/DL (ref 0.7–1.2)
EKG ATRIAL RATE: 75 BPM
EKG ATRIAL RATE: 83 BPM
EKG P AXIS: 57 DEGREES
EKG P AXIS: 70 DEGREES
EKG P-R INTERVAL: 132 MS
EKG P-R INTERVAL: 140 MS
EKG Q-T INTERVAL: 464 MS
EKG Q-T INTERVAL: 482 MS
EKG QRS DURATION: 148 MS
EKG QRS DURATION: 152 MS
EKG QTC CALCULATION (BAZETT): 538 MS
EKG QTC CALCULATION (BAZETT): 545 MS
EKG R AXIS: 111 DEGREES
EKG R AXIS: 55 DEGREES
EKG T AXIS: 46 DEGREES
EKG T AXIS: 8 DEGREES
EKG VENTRICULAR RATE: 75 BPM
EKG VENTRICULAR RATE: 83 BPM
EOSINOPHILS ABSOLUTE: 0.1 K/UL (ref 0–0.7)
EOSINOPHILS ABSOLUTE: 0.1 K/UL (ref 0–0.7)
EOSINOPHILS RELATIVE PERCENT: 0.7 %
EOSINOPHILS RELATIVE PERCENT: 0.8 %
ETHANOL PERCENT: NORMAL G/DL
ETHANOL: <10 MG/DL (ref 0–0.08)
FOLATE: 13.7 NG/ML (ref 7.3–26.1)
GFR AFRICAN AMERICAN: >60
GFR NON-AFRICAN AMERICAN: >60
GLOBULIN: 3.7 G/DL (ref 2.3–3.5)
GLOBULIN: 3.9 G/DL (ref 2.3–3.5)
GLUCOSE BLD-MCNC: 104 MG/DL (ref 70–99)
GLUCOSE BLD-MCNC: 105 MG/DL (ref 70–99)
GLUCOSE BLD-MCNC: 124 MG/DL (ref 70–99)
GLUCOSE BLD-MCNC: 124 MG/DL (ref 70–99)
GLUCOSE BLD-MCNC: 140 MG/DL (ref 70–99)
GLUCOSE BLD-MCNC: 96 MG/DL (ref 70–99)
GLUCOSE BLD-MCNC: 96 MG/DL (ref 74–109)
GLUCOSE BLD-MCNC: 99 MG/DL (ref 70–99)
GLUCOSE URINE: NEGATIVE MG/DL
GLUCOSE URINE: NEGATIVE MG/DL
HCT VFR BLD CALC: 34.2 % (ref 42–52)
HCT VFR BLD CALC: 34.9 % (ref 42–52)
HCT VFR BLD CALC: 34.9 % (ref 42–52)
HCT VFR BLD CALC: 35.5 % (ref 42–52)
HCT VFR BLD CALC: 36.2 % (ref 42–52)
HCT VFR BLD CALC: 38.4 % (ref 42–52)
HCT VFR BLD CALC: 39.1 % (ref 42–52)
HCT VFR BLD CALC: 41.8 % (ref 42–52)
HCT VFR BLD CALC: 43.5 % (ref 42–52)
HEMOGLOBIN: 12 G/DL (ref 14–18)
HEMOGLOBIN: 12.1 G/DL (ref 14–18)
HEMOGLOBIN: 12.2 G/DL (ref 14–18)
HEMOGLOBIN: 12.6 G/DL (ref 14–18)
HEMOGLOBIN: 12.8 G/DL (ref 14–18)
HEMOGLOBIN: 13.5 G/DL (ref 14–18)
HEMOGLOBIN: 14 G/DL (ref 14–18)
HEMOGLOBIN: 14.9 G/DL (ref 14–18)
HEMOGLOBIN: 14.9 G/DL (ref 14–18)
INR BLD: 1
KETONES, URINE: ABNORMAL MG/DL
KETONES, URINE: NEGATIVE MG/DL
LACTIC ACID: 1 MMOL/L (ref 0.5–2.2)
LEUKOCYTE ESTERASE, URINE: NEGATIVE
LEUKOCYTE ESTERASE, URINE: NEGATIVE
LIPASE: 6 U/L (ref 12–95)
LV EF: 40 %
LVEF MODALITY: NORMAL
LYMPHOCYTES ABSOLUTE: 0.7 K/UL (ref 1–4.8)
LYMPHOCYTES ABSOLUTE: 0.9 K/UL (ref 1–4.8)
LYMPHOCYTES RELATIVE PERCENT: 10.4 %
LYMPHOCYTES RELATIVE PERCENT: 7 %
Lab: ABNORMAL
MCH RBC QN AUTO: 32.4 PG (ref 27–31.3)
MCH RBC QN AUTO: 32.8 PG (ref 27–31.3)
MCH RBC QN AUTO: 33 PG (ref 27–31.3)
MCH RBC QN AUTO: 33.2 PG (ref 27–31.3)
MCH RBC QN AUTO: 33.3 PG (ref 27–31.3)
MCH RBC QN AUTO: 33.4 PG (ref 27–31.3)
MCH RBC QN AUTO: 33.6 PG (ref 27–31.3)
MCHC RBC AUTO-ENTMCNC: 34.2 % (ref 33–37)
MCHC RBC AUTO-ENTMCNC: 34.7 % (ref 33–37)
MCHC RBC AUTO-ENTMCNC: 34.9 % (ref 33–37)
MCHC RBC AUTO-ENTMCNC: 35.2 % (ref 33–37)
MCHC RBC AUTO-ENTMCNC: 35.2 % (ref 33–37)
MCHC RBC AUTO-ENTMCNC: 35.3 % (ref 33–37)
MCHC RBC AUTO-ENTMCNC: 35.5 % (ref 33–37)
MCHC RBC AUTO-ENTMCNC: 35.5 % (ref 33–37)
MCHC RBC AUTO-ENTMCNC: 35.8 % (ref 33–37)
MCV RBC AUTO: 92 FL (ref 80–100)
MCV RBC AUTO: 93.3 FL (ref 80–100)
MCV RBC AUTO: 93.7 FL (ref 80–100)
MCV RBC AUTO: 93.9 FL (ref 80–100)
MCV RBC AUTO: 94.6 FL (ref 80–100)
MCV RBC AUTO: 96 FL (ref 80–100)
MCV RBC AUTO: 96.9 FL (ref 80–100)
MONOCYTES ABSOLUTE: 0.5 K/UL (ref 0.2–0.8)
MONOCYTES ABSOLUTE: 0.7 K/UL (ref 0.2–0.8)
MONOCYTES RELATIVE PERCENT: 5.1 %
MONOCYTES RELATIVE PERCENT: 7.8 %
NEUTROPHILS ABSOLUTE: 7.1 K/UL (ref 1.4–6.5)
NEUTROPHILS ABSOLUTE: 8.9 K/UL (ref 1.4–6.5)
NEUTROPHILS RELATIVE PERCENT: 80.6 %
NEUTROPHILS RELATIVE PERCENT: 86.6 %
NITRITE, URINE: NEGATIVE
NITRITE, URINE: NEGATIVE
OPIATE SCREEN URINE: ABNORMAL
PDW BLD-RTO: 13.3 % (ref 11.5–14.5)
PDW BLD-RTO: 15.2 % (ref 11.5–14.5)
PDW BLD-RTO: 15.3 % (ref 11.5–14.5)
PDW BLD-RTO: 15.4 % (ref 11.5–14.5)
PDW BLD-RTO: 15.4 % (ref 11.5–14.5)
PDW BLD-RTO: 15.5 % (ref 11.5–14.5)
PDW BLD-RTO: 15.9 % (ref 11.5–14.5)
PERFORMED ON: NORMAL
PH UA: 6.5 (ref 5–9)
PH UA: 7.5 (ref 5–9)
PHENCYCLIDINE SCREEN URINE: ABNORMAL
PLATELET # BLD: 183 K/UL (ref 130–400)
PLATELET # BLD: 186 K/UL (ref 130–400)
PLATELET # BLD: 194 K/UL (ref 130–400)
PLATELET # BLD: 198 K/UL (ref 130–400)
PLATELET # BLD: 207 K/UL (ref 130–400)
PLATELET # BLD: 224 K/UL (ref 130–400)
PLATELET # BLD: 225 K/UL (ref 130–400)
PLATELET # BLD: 247 K/UL (ref 130–400)
PLATELET # BLD: 267 K/UL (ref 130–400)
POC CREATININE WHOLE BLOOD: 1
POC CREATININE WHOLE BLOOD: 1.2
POC CREATININE: 1 MG/DL (ref 0.8–1.3)
POC SAMPLE TYPE: NORMAL
POTASSIUM REFLEX MAGNESIUM: 3.6 MEQ/L (ref 3.4–4.9)
POTASSIUM REFLEX MAGNESIUM: 3.7 MEQ/L (ref 3.4–4.9)
POTASSIUM REFLEX MAGNESIUM: 3.9 MEQ/L (ref 3.4–4.9)
POTASSIUM REFLEX MAGNESIUM: 4 MEQ/L (ref 3.4–4.9)
POTASSIUM REFLEX MAGNESIUM: 4.2 MEQ/L (ref 3.4–4.9)
POTASSIUM SERPL-SCNC: 3.7 MEQ/L (ref 3.4–4.9)
POTASSIUM SERPL-SCNC: 4.7 MEQ/L (ref 3.4–4.9)
POTASSIUM SERPL-SCNC: 5.3 MEQ/L (ref 3.5–5.1)
PROTEIN UA: NEGATIVE MG/DL
PROTEIN UA: NEGATIVE MG/DL
PROTHROMBIN TIME: 13.4 SEC (ref 12.3–14.9)
RBC # BLD: 3.6 M/UL (ref 4.7–6.1)
RBC # BLD: 3.69 M/UL (ref 4.7–6.1)
RBC # BLD: 3.71 M/UL (ref 4.7–6.1)
RBC # BLD: 3.8 M/UL (ref 4.7–6.1)
RBC # BLD: 3.88 M/UL (ref 4.7–6.1)
RBC # BLD: 4.1 M/UL (ref 4.7–6.1)
RBC # BLD: 4.2 M/UL (ref 4.7–6.1)
RBC # BLD: 4.45 M/UL (ref 4.7–6.1)
RBC # BLD: 4.53 M/UL (ref 4.7–6.1)
SODIUM BLD-SCNC: 133 MEQ/L (ref 135–144)
SODIUM BLD-SCNC: 137 MEQ/L (ref 135–144)
SODIUM BLD-SCNC: 138 MEQ/L (ref 135–144)
SODIUM BLD-SCNC: 140 MEQ/L (ref 132–144)
SODIUM BLD-SCNC: 140 MEQ/L (ref 135–144)
SPECIFIC GRAVITY UA: 1.01 (ref 1–1.03)
SPECIFIC GRAVITY UA: 1.04 (ref 1–1.03)
TOTAL PROTEIN: 7.2 G/DL (ref 6.3–8)
TOTAL PROTEIN: 7.4 G/DL (ref 6.3–8)
TOTAL PROTEIN: 7.7 G/DL (ref 6.3–8)
TROPONIN: <0.01 NG/ML (ref 0–0.01)
URINE CULTURE, ROUTINE: NORMAL
URINE REFLEX TO CULTURE: ABNORMAL
UROBILINOGEN, URINE: 1 E.U./DL
UROBILINOGEN, URINE: 1 E.U./DL
VITAMIN B-12: 427 PG/ML (ref 232–1245)
WBC # BLD: 10.3 K/UL (ref 4.8–10.8)
WBC # BLD: 5.8 K/UL (ref 4.8–10.8)
WBC # BLD: 6 K/UL (ref 4.8–10.8)
WBC # BLD: 6 K/UL (ref 4.8–10.8)
WBC # BLD: 7.3 K/UL (ref 4.8–10.8)
WBC # BLD: 7.6 K/UL (ref 4.8–10.8)
WBC # BLD: 7.8 K/UL (ref 4.8–10.8)
WBC # BLD: 8.6 K/UL (ref 4.8–10.8)
WBC # BLD: 8.8 K/UL (ref 4.8–10.8)

## 2019-01-01 PROCEDURE — 93306 TTE W/DOPPLER COMPLETE: CPT

## 2019-01-01 PROCEDURE — 97112 NEUROMUSCULAR REEDUCATION: CPT

## 2019-01-01 PROCEDURE — 81003 URINALYSIS AUTO W/O SCOPE: CPT

## 2019-01-01 PROCEDURE — 36415 COLL VENOUS BLD VENIPUNCTURE: CPT

## 2019-01-01 PROCEDURE — 70450 CT HEAD/BRAIN W/O DYE: CPT

## 2019-01-01 PROCEDURE — 2500000003 HC RX 250 WO HCPCS: Performed by: SURGERY

## 2019-01-01 PROCEDURE — 97110 THERAPEUTIC EXERCISES: CPT

## 2019-01-01 PROCEDURE — 6360000002 HC RX W HCPCS: Performed by: NURSE PRACTITIONER

## 2019-01-01 PROCEDURE — C1781 MESH (IMPLANTABLE): HCPCS | Performed by: SURGERY

## 2019-01-01 PROCEDURE — 72125 CT NECK SPINE W/O DYE: CPT

## 2019-01-01 PROCEDURE — 97116 GAIT TRAINING THERAPY: CPT

## 2019-01-01 PROCEDURE — 99221 1ST HOSP IP/OBS SF/LOW 40: CPT | Performed by: PHYSICAL MEDICINE & REHABILITATION

## 2019-01-01 PROCEDURE — 7100000000 HC PACU RECOVERY - FIRST 15 MIN: Performed by: ORTHOPAEDIC SURGERY

## 2019-01-01 PROCEDURE — 6830039000 HC L3 TRAUMA ALERT

## 2019-01-01 PROCEDURE — 0SRS0JZ REPLACEMENT OF LEFT HIP JOINT, FEMORAL SURFACE WITH SYNTHETIC SUBSTITUTE, OPEN APPROACH: ICD-10-PCS | Performed by: ORTHOPAEDIC SURGERY

## 2019-01-01 PROCEDURE — 97535 SELF CARE MNGMENT TRAINING: CPT

## 2019-01-01 PROCEDURE — 99254 IP/OBS CNSLTJ NEW/EST MOD 60: CPT | Performed by: INTERNAL MEDICINE

## 2019-01-01 PROCEDURE — 2580000003 HC RX 258: Performed by: NURSE PRACTITIONER

## 2019-01-01 PROCEDURE — 1180000000 HC REHAB R&B

## 2019-01-01 PROCEDURE — 6360000002 HC RX W HCPCS: Performed by: SURGERY

## 2019-01-01 PROCEDURE — 3700000001 HC ADD 15 MINUTES (ANESTHESIA): Performed by: SURGERY

## 2019-01-01 PROCEDURE — 6370000000 HC RX 637 (ALT 250 FOR IP): Performed by: NURSE PRACTITIONER

## 2019-01-01 PROCEDURE — 7100000001 HC PACU RECOVERY - ADDTL 15 MIN: Performed by: ORTHOPAEDIC SURGERY

## 2019-01-01 PROCEDURE — G8420 CALC BMI NORM PARAMETERS: HCPCS | Performed by: SURGERY

## 2019-01-01 PROCEDURE — 3600000004 HC SURGERY LEVEL 4 BASE: Performed by: ORTHOPAEDIC SURGERY

## 2019-01-01 PROCEDURE — 85027 COMPLETE CBC AUTOMATED: CPT

## 2019-01-01 PROCEDURE — 97140 MANUAL THERAPY 1/> REGIONS: CPT

## 2019-01-01 PROCEDURE — 97166 OT EVAL MOD COMPLEX 45 MIN: CPT

## 2019-01-01 PROCEDURE — 6370000000 HC RX 637 (ALT 250 FOR IP): Performed by: PHYSICAL MEDICINE & REHABILITATION

## 2019-01-01 PROCEDURE — G8598 ASA/ANTIPLAT THER USED: HCPCS | Performed by: SURGERY

## 2019-01-01 PROCEDURE — 96374 THER/PROPH/DIAG INJ IV PUSH: CPT

## 2019-01-01 PROCEDURE — 71275 CT ANGIOGRAPHY CHEST: CPT

## 2019-01-01 PROCEDURE — 83690 ASSAY OF LIPASE: CPT

## 2019-01-01 PROCEDURE — 99232 SBSQ HOSP IP/OBS MODERATE 35: CPT | Performed by: INTERNAL MEDICINE

## 2019-01-01 PROCEDURE — 80076 HEPATIC FUNCTION PANEL: CPT

## 2019-01-01 PROCEDURE — 87086 URINE CULTURE/COLONY COUNT: CPT

## 2019-01-01 PROCEDURE — 99213 OFFICE O/P EST LOW 20 MIN: CPT | Performed by: SURGERY

## 2019-01-01 PROCEDURE — 85025 COMPLETE CBC W/AUTO DIFF WBC: CPT

## 2019-01-01 PROCEDURE — 73501 X-RAY EXAM HIP UNI 1 VIEW: CPT

## 2019-01-01 PROCEDURE — 80053 COMPREHEN METABOLIC PANEL: CPT

## 2019-01-01 PROCEDURE — G8484 FLU IMMUNIZE NO ADMIN: HCPCS | Performed by: SURGERY

## 2019-01-01 PROCEDURE — 2580000003 HC RX 258: Performed by: ORTHOPAEDIC SURGERY

## 2019-01-01 PROCEDURE — 1210000000 HC MED SURG R&B

## 2019-01-01 PROCEDURE — 97162 PT EVAL MOD COMPLEX 30 MIN: CPT

## 2019-01-01 PROCEDURE — 6360000002 HC RX W HCPCS: Performed by: PERSONAL EMERGENCY RESPONSE ATTENDANT

## 2019-01-01 PROCEDURE — 6360000002 HC RX W HCPCS: Performed by: ORTHOPAEDIC SURGERY

## 2019-01-01 PROCEDURE — 99285 EMERGENCY DEPT VISIT HI MDM: CPT

## 2019-01-01 PROCEDURE — 82746 ASSAY OF FOLIC ACID SERUM: CPT

## 2019-01-01 PROCEDURE — 88305 TISSUE EXAM BY PATHOLOGIST: CPT

## 2019-01-01 PROCEDURE — 6360000002 HC RX W HCPCS: Performed by: NURSE ANESTHETIST, CERTIFIED REGISTERED

## 2019-01-01 PROCEDURE — 88311 DECALCIFY TISSUE: CPT

## 2019-01-01 PROCEDURE — 73552 X-RAY EXAM OF FEMUR 2/>: CPT

## 2019-01-01 PROCEDURE — 86900 BLOOD TYPING SEROLOGIC ABO: CPT

## 2019-01-01 PROCEDURE — 6360000004 HC RX CONTRAST MEDICATION: Performed by: RADIOLOGY

## 2019-01-01 PROCEDURE — 99232 SBSQ HOSP IP/OBS MODERATE 35: CPT | Performed by: PHYSICAL MEDICINE & REHABILITATION

## 2019-01-01 PROCEDURE — 80048 BASIC METABOLIC PNL TOTAL CA: CPT

## 2019-01-01 PROCEDURE — 6360000002 HC RX W HCPCS: Performed by: PHYSICAL MEDICINE & REHABILITATION

## 2019-01-01 PROCEDURE — 6370000000 HC RX 637 (ALT 250 FOR IP): Performed by: ANESTHESIOLOGY

## 2019-01-01 PROCEDURE — 97163 PT EVAL HIGH COMPLEX 45 MIN: CPT

## 2019-01-01 PROCEDURE — C1776 JOINT DEVICE (IMPLANTABLE): HCPCS | Performed by: ORTHOPAEDIC SURGERY

## 2019-01-01 PROCEDURE — 2500000003 HC RX 250 WO HCPCS: Performed by: NURSE ANESTHETIST, CERTIFIED REGISTERED

## 2019-01-01 PROCEDURE — 86901 BLOOD TYPING SEROLOGIC RH(D): CPT

## 2019-01-01 PROCEDURE — 6370000000 HC RX 637 (ALT 250 FOR IP): Performed by: INTERNAL MEDICINE

## 2019-01-01 PROCEDURE — 85730 THROMBOPLASTIN TIME PARTIAL: CPT

## 2019-01-01 PROCEDURE — 6370000000 HC RX 637 (ALT 250 FOR IP): Performed by: SURGERY

## 2019-01-01 PROCEDURE — 3700000001 HC ADD 15 MINUTES (ANESTHESIA): Performed by: ORTHOPAEDIC SURGERY

## 2019-01-01 PROCEDURE — 74177 CT ABD & PELVIS W/CONTRAST: CPT

## 2019-01-01 PROCEDURE — 6370000000 HC RX 637 (ALT 250 FOR IP): Performed by: ORTHOPAEDIC SURGERY

## 2019-01-01 PROCEDURE — 6360000002 HC RX W HCPCS: Performed by: INTERNAL MEDICINE

## 2019-01-01 PROCEDURE — 3600000002 HC SURGERY LEVEL 2 BASE: Performed by: SURGERY

## 2019-01-01 PROCEDURE — 73502 X-RAY EXAM HIP UNI 2-3 VIEWS: CPT

## 2019-01-01 PROCEDURE — 2580000003 HC RX 258: Performed by: NURSE ANESTHETIST, CERTIFIED REGISTERED

## 2019-01-01 PROCEDURE — 84484 ASSAY OF TROPONIN QUANT: CPT

## 2019-01-01 PROCEDURE — 4004F PT TOBACCO SCREEN RCVD TLK: CPT | Performed by: SURGERY

## 2019-01-01 PROCEDURE — 2709999900 HC NON-CHARGEABLE SUPPLY: Performed by: SURGERY

## 2019-01-01 PROCEDURE — 49505 PRP I/HERN INIT REDUC >5 YR: CPT | Performed by: SURGERY

## 2019-01-01 PROCEDURE — 82607 VITAMIN B-12: CPT

## 2019-01-01 PROCEDURE — 3600000014 HC SURGERY LEVEL 4 ADDTL 15MIN: Performed by: ORTHOPAEDIC SURGERY

## 2019-01-01 PROCEDURE — 2500000003 HC RX 250 WO HCPCS: Performed by: PHYSICIAN ASSISTANT

## 2019-01-01 PROCEDURE — 97167 OT EVAL HIGH COMPLEX 60 MIN: CPT

## 2019-01-01 PROCEDURE — 97530 THERAPEUTIC ACTIVITIES: CPT

## 2019-01-01 PROCEDURE — G0480 DRUG TEST DEF 1-7 CLASSES: HCPCS

## 2019-01-01 PROCEDURE — 86850 RBC ANTIBODY SCREEN: CPT

## 2019-01-01 PROCEDURE — 85610 PROTHROMBIN TIME: CPT

## 2019-01-01 PROCEDURE — 72131 CT LUMBAR SPINE W/O DYE: CPT

## 2019-01-01 PROCEDURE — 3017F COLORECTAL CA SCREEN DOC REV: CPT | Performed by: SURGERY

## 2019-01-01 PROCEDURE — 6360000004 HC RX CONTRAST MEDICATION: Performed by: PERSONAL EMERGENCY RESPONSE ATTENDANT

## 2019-01-01 PROCEDURE — 99233 SBSQ HOSP IP/OBS HIGH 50: CPT | Performed by: PHYSICAL MEDICINE & REHABILITATION

## 2019-01-01 PROCEDURE — 2580000003 HC RX 258: Performed by: HOSPITALIST

## 2019-01-01 PROCEDURE — 94150 VITAL CAPACITY TEST: CPT

## 2019-01-01 PROCEDURE — 2580000003 HC RX 258: Performed by: SURGERY

## 2019-01-01 PROCEDURE — 96372 THER/PROPH/DIAG INJ SC/IM: CPT

## 2019-01-01 PROCEDURE — 2709999900 HC NON-CHARGEABLE SUPPLY: Performed by: ORTHOPAEDIC SURGERY

## 2019-01-01 PROCEDURE — 93010 ELECTROCARDIOGRAM REPORT: CPT | Performed by: INTERNAL MEDICINE

## 2019-01-01 PROCEDURE — 3700000000 HC ANESTHESIA ATTENDED CARE: Performed by: ORTHOPAEDIC SURGERY

## 2019-01-01 PROCEDURE — 2500000003 HC RX 250 WO HCPCS: Performed by: NURSE PRACTITIONER

## 2019-01-01 PROCEDURE — G8427 DOCREV CUR MEDS BY ELIG CLIN: HCPCS | Performed by: SURGERY

## 2019-01-01 PROCEDURE — 93005 ELECTROCARDIOGRAM TRACING: CPT

## 2019-01-01 PROCEDURE — 71045 X-RAY EXAM CHEST 1 VIEW: CPT

## 2019-01-01 PROCEDURE — 99222 1ST HOSP IP/OBS MODERATE 55: CPT | Performed by: PHYSICAL MEDICINE & REHABILITATION

## 2019-01-01 PROCEDURE — 7100000010 HC PHASE II RECOVERY - FIRST 15 MIN: Performed by: SURGERY

## 2019-01-01 PROCEDURE — 7100000001 HC PACU RECOVERY - ADDTL 15 MIN: Performed by: SURGERY

## 2019-01-01 PROCEDURE — 80307 DRUG TEST PRSMV CHEM ANLYZR: CPT

## 2019-01-01 PROCEDURE — 3600000012 HC SURGERY LEVEL 2 ADDTL 15MIN: Performed by: SURGERY

## 2019-01-01 PROCEDURE — 3700000000 HC ANESTHESIA ATTENDED CARE: Performed by: SURGERY

## 2019-01-01 PROCEDURE — 96375 TX/PRO/DX INJ NEW DRUG ADDON: CPT

## 2019-01-01 PROCEDURE — 7100000000 HC PACU RECOVERY - FIRST 15 MIN: Performed by: SURGERY

## 2019-01-01 PROCEDURE — 99282 EMERGENCY DEPT VISIT SF MDM: CPT

## 2019-01-01 PROCEDURE — 83605 ASSAY OF LACTIC ACID: CPT

## 2019-01-01 PROCEDURE — 99231 SBSQ HOSP IP/OBS SF/LOW 25: CPT | Performed by: PHYSICAL MEDICINE & REHABILITATION

## 2019-01-01 PROCEDURE — 7100000011 HC PHASE II RECOVERY - ADDTL 15 MIN: Performed by: SURGERY

## 2019-01-01 DEVICE — IMPLANTABLE DEVICE: Type: IMPLANTABLE DEVICE | Site: HIP | Status: FUNCTIONAL

## 2019-01-01 DEVICE — BIPOLAR LINER 53/54/55MM OD X 28MM ID: Type: IMPLANTABLE DEVICE | Site: HIP | Status: FUNCTIONAL

## 2019-01-01 DEVICE — PLUG HERN L W1.6XL1.9IN INGUINAL POLYPR REP PRESHAPED ONLAY: Type: IMPLANTABLE DEVICE | Status: FUNCTIONAL

## 2019-01-01 DEVICE — CABLE SURG DIA1.7MM S STL HA CERCLAGE W/ CRMP 29880101S] DEPUY SYNTHES USA]: Type: IMPLANTABLE DEVICE | Site: FEMUR | Status: FUNCTIONAL

## 2019-01-01 DEVICE — HEAD FEM DIA28MM NK L+0MM HIP CO CHROM CEM PRI PRESSFIT: Type: IMPLANTABLE DEVICE | Site: HIP | Status: FUNCTIONAL

## 2019-01-01 DEVICE — MESH HERN W1.3XL1.55IN M POLYPR INGUINAL NONABSORBABLE: Type: IMPLANTABLE DEVICE | Status: FUNCTIONAL

## 2019-01-01 DEVICE — STEM FEM L149MM DIA14MM NEUT STR 12/14 TAPR STD NK OFFSET: Type: IMPLANTABLE DEVICE | Site: HIP | Status: FUNCTIONAL

## 2019-01-01 RX ORDER — LIDOCAINE 4 G/G
2 PATCH TOPICAL EVERY 12 HOURS
Status: CANCELLED | OUTPATIENT
Start: 2019-01-01

## 2019-01-01 RX ORDER — MAGNESIUM HYDROXIDE 1200 MG/15ML
LIQUID ORAL CONTINUOUS PRN
Status: COMPLETED | OUTPATIENT
Start: 2019-01-01 | End: 2019-01-01

## 2019-01-01 RX ORDER — LIDOCAINE 4 G/G
3 PATCH TOPICAL DAILY
Status: DISCONTINUED | OUTPATIENT
Start: 2019-01-01 | End: 2019-01-01 | Stop reason: HOSPADM

## 2019-01-01 RX ORDER — DIAZEPAM 5 MG/1
5 TABLET ORAL EVERY 6 HOURS PRN
Status: CANCELLED | OUTPATIENT
Start: 2019-01-01

## 2019-01-01 RX ORDER — SODIUM CHLORIDE, SODIUM LACTATE, POTASSIUM CHLORIDE, CALCIUM CHLORIDE 600; 310; 30; 20 MG/100ML; MG/100ML; MG/100ML; MG/100ML
INJECTION, SOLUTION INTRAVENOUS CONTINUOUS
Status: DISCONTINUED | OUTPATIENT
Start: 2019-01-01 | End: 2019-01-01 | Stop reason: HOSPADM

## 2019-01-01 RX ORDER — HYDROXYZINE PAMOATE 25 MG/1
50 CAPSULE ORAL 2 TIMES DAILY
Status: CANCELLED | OUTPATIENT
Start: 2019-01-01

## 2019-01-01 RX ORDER — MORPHINE SULFATE 2 MG/ML
1 INJECTION, SOLUTION INTRAMUSCULAR; INTRAVENOUS EVERY 4 HOURS PRN
Status: DISCONTINUED | OUTPATIENT
Start: 2019-01-01 | End: 2019-01-01

## 2019-01-01 RX ORDER — SODIUM CHLORIDE 9 MG/ML
INJECTION, SOLUTION INTRAVENOUS CONTINUOUS
Status: CANCELLED | OUTPATIENT
Start: 2019-01-01

## 2019-01-01 RX ORDER — KETOROLAC TROMETHAMINE 30 MG/ML
30 INJECTION, SOLUTION INTRAMUSCULAR; INTRAVENOUS EVERY 6 HOURS
Status: DISCONTINUED | OUTPATIENT
Start: 2019-01-01 | End: 2019-01-01 | Stop reason: HOSPADM

## 2019-01-01 RX ORDER — ACETAMINOPHEN 500 MG
1000 TABLET ORAL ONCE
Status: DISCONTINUED | OUTPATIENT
Start: 2019-01-01 | End: 2019-01-01 | Stop reason: HOSPADM

## 2019-01-01 RX ORDER — HYDROCODONE BITARTRATE AND ACETAMINOPHEN 5; 325 MG/1; MG/1
1 TABLET ORAL PRN
Status: DISCONTINUED | OUTPATIENT
Start: 2019-01-01 | End: 2019-01-01 | Stop reason: HOSPADM

## 2019-01-01 RX ORDER — FENTANYL CITRATE 50 UG/ML
INJECTION, SOLUTION INTRAMUSCULAR; INTRAVENOUS PRN
Status: DISCONTINUED | OUTPATIENT
Start: 2019-01-01 | End: 2019-01-01 | Stop reason: SDUPTHER

## 2019-01-01 RX ORDER — OXYCODONE HYDROCHLORIDE AND ACETAMINOPHEN 5; 325 MG/1; MG/1
1 TABLET ORAL EVERY 4 HOURS PRN
Status: CANCELLED | OUTPATIENT
Start: 2019-01-01

## 2019-01-01 RX ORDER — ERGOCALCIFEROL 1.25 MG/1
50000 CAPSULE ORAL DAILY
Status: COMPLETED | OUTPATIENT
Start: 2019-01-01 | End: 2019-01-01

## 2019-01-01 RX ORDER — ASPIRIN 81 MG/1
81 TABLET, CHEWABLE ORAL DAILY
Status: DISCONTINUED | OUTPATIENT
Start: 2019-01-01 | End: 2019-01-01 | Stop reason: HOSPADM

## 2019-01-01 RX ORDER — DICYCLOMINE HCL 20 MG
20 TABLET ORAL 4 TIMES DAILY
Qty: 20 TABLET | Refills: 0 | Status: SHIPPED | OUTPATIENT
Start: 2019-01-01

## 2019-01-01 RX ORDER — MIDAZOLAM HYDROCHLORIDE 1 MG/ML
INJECTION INTRAMUSCULAR; INTRAVENOUS PRN
Status: DISCONTINUED | OUTPATIENT
Start: 2019-01-01 | End: 2019-01-01 | Stop reason: SDUPTHER

## 2019-01-01 RX ORDER — SODIUM CHLORIDE 9 MG/ML
INJECTION, SOLUTION INTRAVENOUS CONTINUOUS
Status: DISCONTINUED | OUTPATIENT
Start: 2019-01-01 | End: 2019-01-01 | Stop reason: HOSPADM

## 2019-01-01 RX ORDER — FAMOTIDINE 20 MG/1
20 TABLET, FILM COATED ORAL 2 TIMES DAILY PRN
Status: CANCELLED | OUTPATIENT
Start: 2019-01-01

## 2019-01-01 RX ORDER — ONDANSETRON 2 MG/ML
4 INJECTION INTRAMUSCULAR; INTRAVENOUS EVERY 6 HOURS PRN
Status: DISCONTINUED | OUTPATIENT
Start: 2019-01-01 | End: 2019-01-01

## 2019-01-01 RX ORDER — MORPHINE SULFATE 2 MG/ML
2 INJECTION, SOLUTION INTRAMUSCULAR; INTRAVENOUS
Status: CANCELLED | OUTPATIENT
Start: 2019-01-01

## 2019-01-01 RX ORDER — DIPHENHYDRAMINE HYDROCHLORIDE 50 MG/ML
12.5 INJECTION INTRAMUSCULAR; INTRAVENOUS
Status: DISCONTINUED | OUTPATIENT
Start: 2019-01-01 | End: 2019-01-01 | Stop reason: HOSPADM

## 2019-01-01 RX ORDER — TAMSULOSIN HYDROCHLORIDE 0.4 MG/1
0.4 CAPSULE ORAL DAILY
Status: CANCELLED | OUTPATIENT
Start: 2019-01-01

## 2019-01-01 RX ORDER — HYDROXYZINE PAMOATE 25 MG/1
50 CAPSULE ORAL 2 TIMES DAILY
Status: DISCONTINUED | OUTPATIENT
Start: 2019-01-01 | End: 2019-01-01 | Stop reason: HOSPADM

## 2019-01-01 RX ORDER — PROPOFOL 10 MG/ML
INJECTION, EMULSION INTRAVENOUS PRN
Status: DISCONTINUED | OUTPATIENT
Start: 2019-01-01 | End: 2019-01-01 | Stop reason: SDUPTHER

## 2019-01-01 RX ORDER — OXYCODONE HYDROCHLORIDE AND ACETAMINOPHEN 5; 325 MG/1; MG/1
1 TABLET ORAL EVERY 4 HOURS PRN
Status: DISCONTINUED | OUTPATIENT
Start: 2019-01-01 | End: 2019-01-01

## 2019-01-01 RX ORDER — MIRTAZAPINE 15 MG/1
7.5 TABLET, FILM COATED ORAL NIGHTLY
Status: CANCELLED | OUTPATIENT
Start: 2019-01-01

## 2019-01-01 RX ORDER — SENNA AND DOCUSATE SODIUM 50; 8.6 MG/1; MG/1
1 TABLET, FILM COATED ORAL 2 TIMES DAILY
Status: DISCONTINUED | OUTPATIENT
Start: 2019-01-01 | End: 2019-01-01

## 2019-01-01 RX ORDER — LIDOCAINE HYDROCHLORIDE 20 MG/ML
INJECTION, SOLUTION INFILTRATION; PERINEURAL PRN
Status: DISCONTINUED | OUTPATIENT
Start: 2019-01-01 | End: 2019-01-01 | Stop reason: SDUPTHER

## 2019-01-01 RX ORDER — SODIUM CHLORIDE 0.9 % (FLUSH) 0.9 %
10 SYRINGE (ML) INJECTION PRN
Status: DISCONTINUED | OUTPATIENT
Start: 2019-01-01 | End: 2019-01-01 | Stop reason: HOSPADM

## 2019-01-01 RX ORDER — SODIUM CHLORIDE 0.9 % (FLUSH) 0.9 %
10 SYRINGE (ML) INJECTION EVERY 12 HOURS SCHEDULED
Status: DISCONTINUED | OUTPATIENT
Start: 2019-01-01 | End: 2019-01-01 | Stop reason: HOSPADM

## 2019-01-01 RX ORDER — ATORVASTATIN CALCIUM 80 MG/1
80 TABLET, FILM COATED ORAL NIGHTLY
Status: DISCONTINUED | OUTPATIENT
Start: 2019-01-01 | End: 2019-01-01 | Stop reason: HOSPADM

## 2019-01-01 RX ORDER — HYDROXYZINE PAMOATE 50 MG/1
50 CAPSULE ORAL 2 TIMES DAILY
Status: DISCONTINUED | OUTPATIENT
Start: 2019-01-01 | End: 2019-01-01

## 2019-01-01 RX ORDER — ACETAMINOPHEN 325 MG/1
650 TABLET ORAL EVERY 6 HOURS
Status: DISCONTINUED | OUTPATIENT
Start: 2019-01-01 | End: 2019-01-01

## 2019-01-01 RX ORDER — LIDOCAINE 4 G/G
2 PATCH TOPICAL EVERY 12 HOURS
Status: DISCONTINUED | OUTPATIENT
Start: 2019-01-01 | End: 2019-01-01 | Stop reason: HOSPADM

## 2019-01-01 RX ORDER — OXYCODONE HYDROCHLORIDE 5 MG/1
5 TABLET ORAL EVERY 4 HOURS PRN
Status: DISCONTINUED | OUTPATIENT
Start: 2019-01-01 | End: 2019-01-01 | Stop reason: HOSPADM

## 2019-01-01 RX ORDER — SODIUM CHLORIDE 0.9 % (FLUSH) 0.9 %
10 SYRINGE (ML) INJECTION EVERY 12 HOURS SCHEDULED
Status: CANCELLED | OUTPATIENT
Start: 2019-01-01

## 2019-01-01 RX ORDER — DIAZEPAM 5 MG/1
5 TABLET ORAL EVERY 6 HOURS PRN
Status: DISCONTINUED | OUTPATIENT
Start: 2019-01-01 | End: 2019-01-01 | Stop reason: HOSPADM

## 2019-01-01 RX ORDER — ONDANSETRON 2 MG/ML
INJECTION INTRAMUSCULAR; INTRAVENOUS PRN
Status: DISCONTINUED | OUTPATIENT
Start: 2019-01-01 | End: 2019-01-01 | Stop reason: SDUPTHER

## 2019-01-01 RX ORDER — SENNA AND DOCUSATE SODIUM 50; 8.6 MG/1; MG/1
TABLET, FILM COATED ORAL 2 TIMES DAILY
Status: DISCONTINUED | OUTPATIENT
Start: 2019-01-01 | End: 2019-01-01 | Stop reason: HOSPADM

## 2019-01-01 RX ORDER — ASPIRIN 81 MG/1
81 TABLET, CHEWABLE ORAL DAILY
Status: CANCELLED | OUTPATIENT
Start: 2019-01-01

## 2019-01-01 RX ORDER — QUETIAPINE 400 MG/1
400 TABLET, FILM COATED, EXTENDED RELEASE ORAL NIGHTLY
Status: DISCONTINUED | OUTPATIENT
Start: 2019-01-01 | End: 2019-01-01

## 2019-01-01 RX ORDER — ATORVASTATIN CALCIUM 80 MG/1
80 TABLET, FILM COATED ORAL NIGHTLY
Status: DISCONTINUED | OUTPATIENT
Start: 2019-01-01 | End: 2019-01-01

## 2019-01-01 RX ORDER — TAMSULOSIN HYDROCHLORIDE 0.4 MG/1
0.4 CAPSULE ORAL NIGHTLY
Status: DISCONTINUED | OUTPATIENT
Start: 2019-01-01 | End: 2019-01-01 | Stop reason: HOSPADM

## 2019-01-01 RX ORDER — MEPERIDINE HYDROCHLORIDE 25 MG/ML
12.5 INJECTION INTRAMUSCULAR; INTRAVENOUS; SUBCUTANEOUS EVERY 5 MIN PRN
Status: DISCONTINUED | OUTPATIENT
Start: 2019-01-01 | End: 2019-01-01 | Stop reason: HOSPADM

## 2019-01-01 RX ORDER — HYDROCODONE BITARTRATE AND ACETAMINOPHEN 5; 325 MG/1; MG/1
1 TABLET ORAL PRN
Status: COMPLETED | OUTPATIENT
Start: 2019-01-01 | End: 2019-01-01

## 2019-01-01 RX ORDER — ACETAMINOPHEN 500 MG
500 TABLET ORAL EVERY 4 HOURS PRN
Status: DISCONTINUED | OUTPATIENT
Start: 2019-01-01 | End: 2019-01-01 | Stop reason: HOSPADM

## 2019-01-01 RX ORDER — MIRTAZAPINE 15 MG/1
7.5 TABLET, FILM COATED ORAL NIGHTLY
Status: DISCONTINUED | OUTPATIENT
Start: 2019-01-01 | End: 2019-01-01

## 2019-01-01 RX ORDER — QUETIAPINE 300 MG/1
600 TABLET, FILM COATED, EXTENDED RELEASE ORAL NIGHTLY
Status: DISCONTINUED | OUTPATIENT
Start: 2019-01-01 | End: 2019-01-01 | Stop reason: HOSPADM

## 2019-01-01 RX ORDER — FENTANYL CITRATE 50 UG/ML
50 INJECTION, SOLUTION INTRAMUSCULAR; INTRAVENOUS
Status: DISCONTINUED | OUTPATIENT
Start: 2019-01-01 | End: 2019-01-01 | Stop reason: CLARIF

## 2019-01-01 RX ORDER — CEFAZOLIN SODIUM 2 G/50ML
2 SOLUTION INTRAVENOUS
Status: COMPLETED | OUTPATIENT
Start: 2019-01-01 | End: 2019-01-01

## 2019-01-01 RX ORDER — GLYCOPYRROLATE 1 MG/5 ML
0.1 SYRINGE (ML) INTRAVENOUS ONCE
Status: COMPLETED | OUTPATIENT
Start: 2019-01-01 | End: 2019-01-01

## 2019-01-01 RX ORDER — HYDROCODONE BITARTRATE AND ACETAMINOPHEN 5; 325 MG/1; MG/1
2 TABLET ORAL PRN
Status: COMPLETED | OUTPATIENT
Start: 2019-01-01 | End: 2019-01-01

## 2019-01-01 RX ORDER — ACETAMINOPHEN 500 MG
1000 TABLET ORAL ONCE
Status: CANCELLED | OUTPATIENT
Start: 2019-01-01

## 2019-01-01 RX ORDER — FAMOTIDINE 20 MG/1
20 TABLET, FILM COATED ORAL 2 TIMES DAILY PRN
Status: DISCONTINUED | OUTPATIENT
Start: 2019-01-01 | End: 2019-01-01 | Stop reason: HOSPADM

## 2019-01-01 RX ORDER — ORPHENADRINE CITRATE 100 MG/1
100 TABLET, EXTENDED RELEASE ORAL 2 TIMES DAILY
Status: DISCONTINUED | OUTPATIENT
Start: 2019-01-01 | End: 2019-01-01 | Stop reason: HOSPADM

## 2019-01-01 RX ORDER — KETOROLAC TROMETHAMINE 30 MG/ML
30 INJECTION, SOLUTION INTRAMUSCULAR; INTRAVENOUS ONCE
Status: COMPLETED | OUTPATIENT
Start: 2019-01-01 | End: 2019-01-01

## 2019-01-01 RX ORDER — LIDOCAINE 4 G/G
2 PATCH TOPICAL EVERY 12 HOURS
Status: DISCONTINUED | OUTPATIENT
Start: 2019-01-01 | End: 2019-01-01

## 2019-01-01 RX ORDER — METOCLOPRAMIDE HYDROCHLORIDE 5 MG/ML
10 INJECTION INTRAMUSCULAR; INTRAVENOUS
Status: DISCONTINUED | OUTPATIENT
Start: 2019-01-01 | End: 2019-01-01 | Stop reason: HOSPADM

## 2019-01-01 RX ORDER — SODIUM CHLORIDE 0.9 % (FLUSH) 0.9 %
10 SYRINGE (ML) INJECTION PRN
Status: CANCELLED | OUTPATIENT
Start: 2019-01-01

## 2019-01-01 RX ORDER — FENTANYL CITRATE 50 UG/ML
100 INJECTION, SOLUTION INTRAMUSCULAR; INTRAVENOUS ONCE
Status: COMPLETED | OUTPATIENT
Start: 2019-01-01 | End: 2019-01-01

## 2019-01-01 RX ORDER — BUPIVACAINE HYDROCHLORIDE 5 MG/ML
INJECTION, SOLUTION EPIDURAL; INTRACAUDAL PRN
Status: DISCONTINUED | OUTPATIENT
Start: 2019-01-01 | End: 2019-01-01 | Stop reason: HOSPADM

## 2019-01-01 RX ORDER — SODIUM CHLORIDE, SODIUM LACTATE, POTASSIUM CHLORIDE, CALCIUM CHLORIDE 600; 310; 30; 20 MG/100ML; MG/100ML; MG/100ML; MG/100ML
INJECTION, SOLUTION INTRAVENOUS CONTINUOUS PRN
Status: DISCONTINUED | OUTPATIENT
Start: 2019-01-01 | End: 2019-01-01 | Stop reason: SDUPTHER

## 2019-01-01 RX ORDER — QUETIAPINE 400 MG/1
400 TABLET, FILM COATED, EXTENDED RELEASE ORAL NIGHTLY
Status: DISCONTINUED | OUTPATIENT
Start: 2019-01-01 | End: 2019-01-01 | Stop reason: HOSPADM

## 2019-01-01 RX ORDER — ACETAMINOPHEN 80 MG
TABLET,CHEWABLE ORAL ONCE
Status: COMPLETED | OUTPATIENT
Start: 2019-01-01 | End: 2019-01-01

## 2019-01-01 RX ORDER — MIRTAZAPINE 15 MG/1
7.5 TABLET, FILM COATED ORAL NIGHTLY
Status: DISCONTINUED | OUTPATIENT
Start: 2019-01-01 | End: 2019-01-01 | Stop reason: HOSPADM

## 2019-01-01 RX ORDER — ROCURONIUM BROMIDE 10 MG/ML
INJECTION, SOLUTION INTRAVENOUS PRN
Status: DISCONTINUED | OUTPATIENT
Start: 2019-01-01 | End: 2019-01-01 | Stop reason: SDUPTHER

## 2019-01-01 RX ORDER — TRAMADOL HYDROCHLORIDE 50 MG/1
50 TABLET ORAL
Status: DISCONTINUED | OUTPATIENT
Start: 2019-01-01 | End: 2019-01-01 | Stop reason: HOSPADM

## 2019-01-01 RX ORDER — OXYCODONE HYDROCHLORIDE AND ACETAMINOPHEN 5; 325 MG/1; MG/1
1 TABLET ORAL EVERY 4 HOURS PRN
Status: DISCONTINUED | OUTPATIENT
Start: 2019-01-01 | End: 2019-01-01 | Stop reason: HOSPADM

## 2019-01-01 RX ORDER — TAMSULOSIN HYDROCHLORIDE 0.4 MG/1
0.4 CAPSULE ORAL DAILY
Status: DISCONTINUED | OUTPATIENT
Start: 2019-01-01 | End: 2019-01-01 | Stop reason: HOSPADM

## 2019-01-01 RX ORDER — CYANOCOBALAMIN 1000 UG/ML
1000 INJECTION INTRAMUSCULAR; SUBCUTANEOUS WEEKLY
Status: DISCONTINUED | OUTPATIENT
Start: 2019-01-01 | End: 2019-01-01 | Stop reason: HOSPADM

## 2019-01-01 RX ORDER — SENNA AND DOCUSATE SODIUM 50; 8.6 MG/1; MG/1
1 TABLET, FILM COATED ORAL 2 TIMES DAILY
Status: CANCELLED | OUTPATIENT
Start: 2019-01-01

## 2019-01-01 RX ORDER — HYDRALAZINE HYDROCHLORIDE 20 MG/ML
10 INJECTION INTRAMUSCULAR; INTRAVENOUS EVERY 6 HOURS PRN
Status: DISCONTINUED | OUTPATIENT
Start: 2019-01-01 | End: 2019-01-01 | Stop reason: HOSPADM

## 2019-01-01 RX ORDER — IBUPROFEN 600 MG/1
600 TABLET ORAL EVERY 6 HOURS PRN
Status: DISCONTINUED | OUTPATIENT
Start: 2019-01-01 | End: 2019-01-01 | Stop reason: HOSPADM

## 2019-01-01 RX ORDER — ONDANSETRON 2 MG/ML
4 INJECTION INTRAMUSCULAR; INTRAVENOUS EVERY 6 HOURS PRN
Status: DISCONTINUED | OUTPATIENT
Start: 2019-01-01 | End: 2019-01-01 | Stop reason: HOSPADM

## 2019-01-01 RX ORDER — LIDOCAINE HYDROCHLORIDE 20 MG/ML
INJECTION, SOLUTION INTRAVENOUS PRN
Status: DISCONTINUED | OUTPATIENT
Start: 2019-01-01 | End: 2019-01-01 | Stop reason: SDUPTHER

## 2019-01-01 RX ORDER — SENNA AND DOCUSATE SODIUM 50; 8.6 MG/1; MG/1
1 TABLET, FILM COATED ORAL 2 TIMES DAILY
Status: DISCONTINUED | OUTPATIENT
Start: 2019-01-01 | End: 2019-01-01 | Stop reason: HOSPADM

## 2019-01-01 RX ORDER — CLORAZEPATE DIPOTASSIUM 7.5 MG/1
TABLET ORAL
Refills: 0 | COMMUNITY
Start: 2019-01-01

## 2019-01-01 RX ORDER — ATORVASTATIN CALCIUM 80 MG/1
80 TABLET, FILM COATED ORAL NIGHTLY
Status: CANCELLED | OUTPATIENT
Start: 2019-01-01

## 2019-01-01 RX ORDER — MAGNESIUM HYDROXIDE 1200 MG/15ML
LIQUID ORAL CONTINUOUS PRN
Status: DISCONTINUED | OUTPATIENT
Start: 2019-01-01 | End: 2019-01-01 | Stop reason: HOSPADM

## 2019-01-01 RX ORDER — LISINOPRIL 5 MG/1
5 TABLET ORAL DAILY
Status: DISCONTINUED | OUTPATIENT
Start: 2019-01-01 | End: 2019-01-01 | Stop reason: HOSPADM

## 2019-01-01 RX ORDER — ONDANSETRON 2 MG/ML
4 INJECTION INTRAMUSCULAR; INTRAVENOUS
Status: DISCONTINUED | OUTPATIENT
Start: 2019-01-01 | End: 2019-01-01 | Stop reason: HOSPADM

## 2019-01-01 RX ORDER — TAMSULOSIN HYDROCHLORIDE 0.4 MG/1
0.4 CAPSULE ORAL DAILY
Status: DISCONTINUED | OUTPATIENT
Start: 2019-01-01 | End: 2019-01-01

## 2019-01-01 RX ORDER — FENTANYL CITRATE 50 UG/ML
50 INJECTION, SOLUTION INTRAMUSCULAR; INTRAVENOUS EVERY 10 MIN PRN
Status: DISCONTINUED | OUTPATIENT
Start: 2019-01-01 | End: 2019-01-01 | Stop reason: HOSPADM

## 2019-01-01 RX ORDER — LISINOPRIL 2.5 MG/1
5 TABLET ORAL DAILY
Status: DISCONTINUED | OUTPATIENT
Start: 2019-01-01 | End: 2019-01-01 | Stop reason: HOSPADM

## 2019-01-01 RX ORDER — ACETAMINOPHEN 325 MG/1
650 TABLET ORAL EVERY 6 HOURS
Status: DISCONTINUED | OUTPATIENT
Start: 2019-01-01 | End: 2019-01-01 | Stop reason: HOSPADM

## 2019-01-01 RX ORDER — ONDANSETRON 2 MG/ML
4 INJECTION INTRAMUSCULAR; INTRAVENOUS ONCE
Status: COMPLETED | OUTPATIENT
Start: 2019-01-01 | End: 2019-01-01

## 2019-01-01 RX ORDER — MORPHINE SULFATE 4 MG/ML
4 INJECTION, SOLUTION INTRAMUSCULAR; INTRAVENOUS
Status: CANCELLED | OUTPATIENT
Start: 2019-01-01

## 2019-01-01 RX ORDER — KETOROLAC TROMETHAMINE 10 MG/1
10 TABLET, FILM COATED ORAL EVERY 6 HOURS PRN
Qty: 30 TABLET | Refills: 0 | Status: SHIPPED | OUTPATIENT
Start: 2019-01-01 | End: 2020-02-12

## 2019-01-01 RX ORDER — ACETAMINOPHEN 325 MG/1
650 TABLET ORAL EVERY 4 HOURS PRN
Status: DISCONTINUED | OUTPATIENT
Start: 2019-01-01 | End: 2019-01-01

## 2019-01-01 RX ORDER — FENTANYL CITRATE 50 UG/ML
50 INJECTION, SOLUTION INTRAMUSCULAR; INTRAVENOUS ONCE
Status: COMPLETED | OUTPATIENT
Start: 2019-01-01 | End: 2019-01-01

## 2019-01-01 RX ORDER — TRANEXAMIC ACID 650 1/1
1950 TABLET ORAL
Status: CANCELLED | OUTPATIENT
Start: 2019-01-01 | End: 2019-01-01

## 2019-01-01 RX ORDER — MIDAZOLAM HYDROCHLORIDE 1 MG/ML
INJECTION INTRAMUSCULAR; INTRAVENOUS
Status: COMPLETED
Start: 2019-01-01 | End: 2019-01-01

## 2019-01-01 RX ORDER — DEXAMETHASONE SODIUM PHOSPHATE 10 MG/ML
8 INJECTION INTRAMUSCULAR; INTRAVENOUS ONCE
Status: DISCONTINUED | OUTPATIENT
Start: 2019-01-01 | End: 2019-01-01

## 2019-01-01 RX ORDER — BUPIVACAINE HYDROCHLORIDE 7.5 MG/ML
INJECTION, SOLUTION INTRASPINAL PRN
Status: DISCONTINUED | OUTPATIENT
Start: 2019-01-01 | End: 2019-01-01 | Stop reason: SDUPTHER

## 2019-01-01 RX ORDER — ACETAMINOPHEN 325 MG/1
650 TABLET ORAL EVERY 4 HOURS PRN
Status: DISCONTINUED | OUTPATIENT
Start: 2019-01-01 | End: 2019-01-01 | Stop reason: HOSPADM

## 2019-01-01 RX ORDER — ACETAMINOPHEN 325 MG/1
650 TABLET ORAL EVERY 6 HOURS
Status: CANCELLED | OUTPATIENT
Start: 2019-01-01

## 2019-01-01 RX ORDER — DEXAMETHASONE SODIUM PHOSPHATE 10 MG/ML
INJECTION INTRAMUSCULAR; INTRAVENOUS PRN
Status: DISCONTINUED | OUTPATIENT
Start: 2019-01-01 | End: 2019-01-01 | Stop reason: SDUPTHER

## 2019-01-01 RX ORDER — LIDOCAINE HYDROCHLORIDE 10 MG/ML
1 INJECTION, SOLUTION EPIDURAL; INFILTRATION; INTRACAUDAL; PERINEURAL
Status: COMPLETED | OUTPATIENT
Start: 2019-01-01 | End: 2019-01-01

## 2019-01-01 RX ORDER — LISINOPRIL 5 MG/1
5 TABLET ORAL DAILY
Status: CANCELLED | OUTPATIENT
Start: 2019-01-01

## 2019-01-01 RX ORDER — PROPOFOL 10 MG/ML
INJECTION, EMULSION INTRAVENOUS CONTINUOUS PRN
Status: DISCONTINUED | OUTPATIENT
Start: 2019-01-01 | End: 2019-01-01 | Stop reason: SDUPTHER

## 2019-01-01 RX ORDER — OXYCODONE HYDROCHLORIDE AND ACETAMINOPHEN 5; 325 MG/1; MG/1
2 TABLET ORAL EVERY 4 HOURS PRN
Status: CANCELLED | OUTPATIENT
Start: 2019-01-01

## 2019-01-01 RX ORDER — HYDROCODONE BITARTRATE AND ACETAMINOPHEN 5; 325 MG/1; MG/1
2 TABLET ORAL PRN
Status: DISCONTINUED | OUTPATIENT
Start: 2019-01-01 | End: 2019-01-01 | Stop reason: HOSPADM

## 2019-01-01 RX ADMIN — ENOXAPARIN SODIUM 40 MG: 40 INJECTION SUBCUTANEOUS at 08:49

## 2019-01-01 RX ADMIN — OXYCODONE HYDROCHLORIDE 5 MG: 5 TABLET ORAL at 11:54

## 2019-01-01 RX ADMIN — ATORVASTATIN CALCIUM 80 MG: 80 TABLET, FILM COATED ORAL at 22:14

## 2019-01-01 RX ADMIN — HYDROXYZINE PAMOATE 50 MG: 25 CAPSULE ORAL at 20:47

## 2019-01-01 RX ADMIN — TAMSULOSIN HYDROCHLORIDE 0.4 MG: 0.4 CAPSULE ORAL at 21:42

## 2019-01-01 RX ADMIN — VITAMIN D, TAB 1000IU (100/BT) 1000 UNITS: 25 TAB at 12:31

## 2019-01-01 RX ADMIN — MIRTAZAPINE 7.5 MG: 15 TABLET, FILM COATED ORAL at 22:05

## 2019-01-01 RX ADMIN — ACETAMINOPHEN 650 MG: 325 TABLET ORAL at 18:21

## 2019-01-01 RX ADMIN — OXYCODONE HYDROCHLORIDE 5 MG: 5 TABLET ORAL at 16:02

## 2019-01-01 RX ADMIN — MIRTAZAPINE 7.5 MG: 15 TABLET, FILM COATED ORAL at 19:44

## 2019-01-01 RX ADMIN — METOPROLOL TARTRATE 12.5 MG: 25 TABLET ORAL at 08:20

## 2019-01-01 RX ADMIN — ACETAMINOPHEN 650 MG: 325 TABLET ORAL at 00:06

## 2019-01-01 RX ADMIN — TAMSULOSIN HYDROCHLORIDE 0.4 MG: 0.4 CAPSULE ORAL at 08:51

## 2019-01-01 RX ADMIN — HYDROXYZINE PAMOATE 50 MG: 25 CAPSULE ORAL at 08:19

## 2019-01-01 RX ADMIN — QUETIAPINE FUMARATE 400 MG: 400 TABLET, EXTENDED RELEASE ORAL at 21:41

## 2019-01-01 RX ADMIN — ERGOCALCIFEROL 50000 UNITS: 1.25 CAPSULE ORAL at 08:53

## 2019-01-01 RX ADMIN — SENNOSIDES AND DOCUSATE SODIUM 1 TABLET: 8.6; 5 TABLET ORAL at 08:50

## 2019-01-01 RX ADMIN — ASPIRIN 81 MG 81 MG: 81 TABLET ORAL at 08:20

## 2019-01-01 RX ADMIN — ORPHENADRINE CITRATE 100 MG: 100 TABLET, EXTENDED RELEASE ORAL at 08:53

## 2019-01-01 RX ADMIN — FENTANYL CITRATE 50 MCG: 50 INJECTION, SOLUTION INTRAMUSCULAR; INTRAVENOUS at 19:32

## 2019-01-01 RX ADMIN — OXYCODONE HYDROCHLORIDE AND ACETAMINOPHEN 1 TABLET: 5; 325 TABLET ORAL at 11:46

## 2019-01-01 RX ADMIN — OXYCODONE HYDROCHLORIDE 5 MG: 5 TABLET ORAL at 21:07

## 2019-01-01 RX ADMIN — METOPROLOL TARTRATE 12.5 MG: 25 TABLET, FILM COATED ORAL at 20:40

## 2019-01-01 RX ADMIN — Medication 2 G: at 12:59

## 2019-01-01 RX ADMIN — QUETIAPINE FUMARATE 600 MG: 300 TABLET, EXTENDED RELEASE ORAL at 20:47

## 2019-01-01 RX ADMIN — PROPOFOL 150 MG: 10 INJECTION, EMULSION INTRAVENOUS at 11:59

## 2019-01-01 RX ADMIN — TAMSULOSIN HYDROCHLORIDE 0.4 MG: 0.4 CAPSULE ORAL at 09:51

## 2019-01-01 RX ADMIN — MIRTAZAPINE 7.5 MG: 15 TABLET, FILM COATED ORAL at 00:24

## 2019-01-01 RX ADMIN — LISINOPRIL 5 MG: 2.5 TABLET ORAL at 08:45

## 2019-01-01 RX ADMIN — ASPIRIN 81 MG 81 MG: 81 TABLET ORAL at 10:01

## 2019-01-01 RX ADMIN — ATORVASTATIN CALCIUM 80 MG: 80 TABLET, FILM COATED ORAL at 20:47

## 2019-01-01 RX ADMIN — ATORVASTATIN CALCIUM 80 MG: 80 TABLET, FILM COATED ORAL at 21:33

## 2019-01-01 RX ADMIN — OXYCODONE HYDROCHLORIDE AND ACETAMINOPHEN 1 TABLET: 5; 325 TABLET ORAL at 17:47

## 2019-01-01 RX ADMIN — TRAMADOL HYDROCHLORIDE 50 MG: 50 TABLET, FILM COATED ORAL at 11:54

## 2019-01-01 RX ADMIN — OXYCODONE HYDROCHLORIDE 5 MG: 5 TABLET ORAL at 06:26

## 2019-01-01 RX ADMIN — ORPHENADRINE CITRATE 100 MG: 100 TABLET, EXTENDED RELEASE ORAL at 19:45

## 2019-01-01 RX ADMIN — VITAMIN D, TAB 1000IU (100/BT) 1000 UNITS: 25 TAB at 11:05

## 2019-01-01 RX ADMIN — FENTANYL CITRATE 100 MCG: 50 INJECTION, SOLUTION INTRAMUSCULAR; INTRAVENOUS at 11:59

## 2019-01-01 RX ADMIN — Medication 10 ML: at 20:48

## 2019-01-01 RX ADMIN — MIRTAZAPINE 7.5 MG: 15 TABLET, FILM COATED ORAL at 21:19

## 2019-01-01 RX ADMIN — FAMOTIDINE 20 MG: 20 TABLET ORAL at 08:54

## 2019-01-01 RX ADMIN — OXYCODONE HYDROCHLORIDE AND ACETAMINOPHEN 1 TABLET: 5; 325 TABLET ORAL at 08:45

## 2019-01-01 RX ADMIN — SENNOSIDES AND DOCUSATE SODIUM 1 TABLET: 8.6; 5 TABLET ORAL at 21:41

## 2019-01-01 RX ADMIN — IOPAMIDOL 100 ML: 612 INJECTION, SOLUTION INTRAVENOUS at 20:12

## 2019-01-01 RX ADMIN — SODIUM CHLORIDE: 9 INJECTION, SOLUTION INTRAVENOUS at 06:26

## 2019-01-01 RX ADMIN — ENOXAPARIN SODIUM 40 MG: 40 INJECTION SUBCUTANEOUS at 08:52

## 2019-01-01 RX ADMIN — OXYCODONE HYDROCHLORIDE AND ACETAMINOPHEN 1 TABLET: 5; 325 TABLET ORAL at 14:05

## 2019-01-01 RX ADMIN — OXYCODONE HYDROCHLORIDE AND ACETAMINOPHEN 1 TABLET: 5; 325 TABLET ORAL at 18:36

## 2019-01-01 RX ADMIN — SENNOSIDES AND DOCUSATE SODIUM 1 TABLET: 8.6; 5 TABLET ORAL at 22:04

## 2019-01-01 RX ADMIN — TAMSULOSIN HYDROCHLORIDE 0.4 MG: 0.4 CAPSULE ORAL at 08:50

## 2019-01-01 RX ADMIN — MIRTAZAPINE 7.5 MG: 15 TABLET, FILM COATED ORAL at 20:47

## 2019-01-01 RX ADMIN — TRAMADOL HYDROCHLORIDE 50 MG: 50 TABLET, FILM COATED ORAL at 11:05

## 2019-01-01 RX ADMIN — DIAZEPAM 5 MG: 5 TABLET ORAL at 17:41

## 2019-01-01 RX ADMIN — ACETAMINOPHEN 650 MG: 325 TABLET ORAL at 08:19

## 2019-01-01 RX ADMIN — ORPHENADRINE CITRATE 100 MG: 100 TABLET, EXTENDED RELEASE ORAL at 10:01

## 2019-01-01 RX ADMIN — LISINOPRIL 5 MG: 2.5 TABLET ORAL at 10:01

## 2019-01-01 RX ADMIN — OXYCODONE HYDROCHLORIDE AND ACETAMINOPHEN 1 TABLET: 5; 325 TABLET ORAL at 13:21

## 2019-01-01 RX ADMIN — METOPROLOL TARTRATE 12.5 MG: 25 TABLET ORAL at 22:04

## 2019-01-01 RX ADMIN — HYDROXYZINE PAMOATE 50 MG: 50 CAPSULE ORAL at 22:15

## 2019-01-01 RX ADMIN — SENNOSIDES AND DOCUSATE SODIUM 1 TABLET: 8.6; 5 TABLET ORAL at 20:47

## 2019-01-01 RX ADMIN — ERGOCALCIFEROL 50000 UNITS: 1.25 CAPSULE ORAL at 08:10

## 2019-01-01 RX ADMIN — ACETAMINOPHEN 650 MG: 325 TABLET ORAL at 11:46

## 2019-01-01 RX ADMIN — SENNOSIDES AND DOCUSATE SODIUM 1 TABLET: 8.6; 5 TABLET ORAL at 22:14

## 2019-01-01 RX ADMIN — HYDROXYZINE PAMOATE 50 MG: 25 CAPSULE ORAL at 09:50

## 2019-01-01 RX ADMIN — OXYCODONE HYDROCHLORIDE 5 MG: 5 TABLET ORAL at 09:58

## 2019-01-01 RX ADMIN — SENNOSIDES AND DOCUSATE SODIUM 1 TABLET: 8.6; 5 TABLET ORAL at 20:39

## 2019-01-01 RX ADMIN — LISINOPRIL 5 MG: 2.5 TABLET ORAL at 08:52

## 2019-01-01 RX ADMIN — METOPROLOL TARTRATE 12.5 MG: 25 TABLET ORAL at 08:50

## 2019-01-01 RX ADMIN — LIDOCAINE HYDROCHLORIDE 60 MG: 20 INJECTION, SOLUTION INFILTRATION; PERINEURAL at 11:59

## 2019-01-01 RX ADMIN — METOPROLOL TARTRATE 12.5 MG: 25 TABLET, FILM COATED ORAL at 08:49

## 2019-01-01 RX ADMIN — OXYCODONE HYDROCHLORIDE 5 MG: 5 TABLET ORAL at 01:22

## 2019-01-01 RX ADMIN — OXYCODONE HYDROCHLORIDE 5 MG: 5 TABLET ORAL at 23:53

## 2019-01-01 RX ADMIN — TAMSULOSIN HYDROCHLORIDE 0.4 MG: 0.4 CAPSULE ORAL at 19:44

## 2019-01-01 RX ADMIN — ASPIRIN 81 MG 81 MG: 81 TABLET ORAL at 09:50

## 2019-01-01 RX ADMIN — Medication: at 22:16

## 2019-01-01 RX ADMIN — VITAMIN D, TAB 1000IU (100/BT) 1000 UNITS: 25 TAB at 11:00

## 2019-01-01 RX ADMIN — TRAMADOL HYDROCHLORIDE 50 MG: 50 TABLET, FILM COATED ORAL at 16:59

## 2019-01-01 RX ADMIN — HYDROXYZINE PAMOATE 50 MG: 25 CAPSULE ORAL at 08:50

## 2019-01-01 RX ADMIN — ACETAMINOPHEN 650 MG: 325 TABLET ORAL at 17:58

## 2019-01-01 RX ADMIN — ACETAMINOPHEN 650 MG: 325 TABLET ORAL at 13:15

## 2019-01-01 RX ADMIN — ACETAMINOPHEN 650 MG: 325 TABLET ORAL at 11:00

## 2019-01-01 RX ADMIN — METOPROLOL TARTRATE 12.5 MG: 25 TABLET, FILM COATED ORAL at 19:44

## 2019-01-01 RX ADMIN — ORPHENADRINE CITRATE 100 MG: 100 TABLET, EXTENDED RELEASE ORAL at 08:49

## 2019-01-01 RX ADMIN — CEFAZOLIN SODIUM 2 G: 2 SOLUTION INTRAVENOUS at 12:01

## 2019-01-01 RX ADMIN — OXYCODONE HYDROCHLORIDE AND ACETAMINOPHEN 1 TABLET: 5; 325 TABLET ORAL at 15:42

## 2019-01-01 RX ADMIN — OXYCODONE HYDROCHLORIDE AND ACETAMINOPHEN 1 TABLET: 5; 325 TABLET ORAL at 22:05

## 2019-01-01 RX ADMIN — METOPROLOL TARTRATE 12.5 MG: 25 TABLET, FILM COATED ORAL at 21:19

## 2019-01-01 RX ADMIN — SENNOSIDES AND DOCUSATE SODIUM 1 TABLET: 8.6; 5 TABLET ORAL at 21:19

## 2019-01-01 RX ADMIN — ORPHENADRINE CITRATE 100 MG: 100 TABLET, EXTENDED RELEASE ORAL at 08:46

## 2019-01-01 RX ADMIN — ACETAMINOPHEN 650 MG: 325 TABLET ORAL at 17:54

## 2019-01-01 RX ADMIN — METOPROLOL TARTRATE 12.5 MG: 25 TABLET, FILM COATED ORAL at 21:08

## 2019-01-01 RX ADMIN — DIAZEPAM 5 MG: 5 TABLET ORAL at 11:40

## 2019-01-01 RX ADMIN — OXYCODONE HYDROCHLORIDE AND ACETAMINOPHEN 1 TABLET: 5; 325 TABLET ORAL at 22:52

## 2019-01-01 RX ADMIN — SODIUM CHLORIDE: 9 INJECTION, SOLUTION INTRAVENOUS at 18:20

## 2019-01-01 RX ADMIN — METOPROLOL TARTRATE 12.5 MG: 25 TABLET ORAL at 20:05

## 2019-01-01 RX ADMIN — SENNOSIDES AND DOCUSATE SODIUM 1 TABLET: 8.6; 5 TABLET ORAL at 10:58

## 2019-01-01 RX ADMIN — OXYCODONE HYDROCHLORIDE AND ACETAMINOPHEN 1 TABLET: 5; 325 TABLET ORAL at 20:39

## 2019-01-01 RX ADMIN — OXYCODONE HYDROCHLORIDE 5 MG: 5 TABLET ORAL at 05:23

## 2019-01-01 RX ADMIN — OXYCODONE HYDROCHLORIDE AND ACETAMINOPHEN 1 TABLET: 5; 325 TABLET ORAL at 08:12

## 2019-01-01 RX ADMIN — ACETAMINOPHEN 650 MG: 325 TABLET ORAL at 05:49

## 2019-01-01 RX ADMIN — MIDAZOLAM HYDROCHLORIDE 2 MG: 1 INJECTION, SOLUTION INTRAMUSCULAR; INTRAVENOUS at 11:53

## 2019-01-01 RX ADMIN — MIRTAZAPINE 7.5 MG: 15 TABLET, FILM COATED ORAL at 21:40

## 2019-01-01 RX ADMIN — METOPROLOL TARTRATE 12.5 MG: 25 TABLET ORAL at 00:07

## 2019-01-01 RX ADMIN — METOPROLOL TARTRATE 12.5 MG: 25 TABLET ORAL at 09:50

## 2019-01-01 RX ADMIN — DIAZEPAM 5 MG: 5 TABLET ORAL at 23:59

## 2019-01-01 RX ADMIN — ACETAMINOPHEN 650 MG: 325 TABLET ORAL at 12:43

## 2019-01-01 RX ADMIN — ASPIRIN 81 MG 81 MG: 81 TABLET ORAL at 08:46

## 2019-01-01 RX ADMIN — TAMSULOSIN HYDROCHLORIDE 0.4 MG: 0.4 CAPSULE ORAL at 18:21

## 2019-01-01 RX ADMIN — TRAMADOL HYDROCHLORIDE 50 MG: 50 TABLET, FILM COATED ORAL at 08:49

## 2019-01-01 RX ADMIN — ACETAMINOPHEN 650 MG: 325 TABLET ORAL at 11:42

## 2019-01-01 RX ADMIN — OXYCODONE HYDROCHLORIDE AND ACETAMINOPHEN 1 TABLET: 5; 325 TABLET ORAL at 08:51

## 2019-01-01 RX ADMIN — DIAZEPAM 5 MG: 5 TABLET ORAL at 22:14

## 2019-01-01 RX ADMIN — SENNOSIDES AND DOCUSATE SODIUM 1 TABLET: 8.6; 5 TABLET ORAL at 08:45

## 2019-01-01 RX ADMIN — OXYCODONE HYDROCHLORIDE AND ACETAMINOPHEN 1 TABLET: 5; 325 TABLET ORAL at 16:57

## 2019-01-01 RX ADMIN — OXYCODONE HYDROCHLORIDE AND ACETAMINOPHEN 1 TABLET: 5; 325 TABLET ORAL at 00:24

## 2019-01-01 RX ADMIN — ATORVASTATIN CALCIUM 80 MG: 80 TABLET, FILM COATED ORAL at 22:04

## 2019-01-01 RX ADMIN — TRAMADOL HYDROCHLORIDE 50 MG: 50 TABLET, FILM COATED ORAL at 08:46

## 2019-01-01 RX ADMIN — DIAZEPAM 5 MG: 5 TABLET ORAL at 20:53

## 2019-01-01 RX ADMIN — OXYCODONE HYDROCHLORIDE AND ACETAMINOPHEN 1 TABLET: 5; 325 TABLET ORAL at 04:59

## 2019-01-01 RX ADMIN — HYDROXYZINE PAMOATE 50 MG: 25 CAPSULE ORAL at 20:04

## 2019-01-01 RX ADMIN — SODIUM CHLORIDE: 9 INJECTION, SOLUTION INTRAVENOUS at 21:04

## 2019-01-01 RX ADMIN — OXYCODONE HYDROCHLORIDE AND ACETAMINOPHEN 1 TABLET: 5; 325 TABLET ORAL at 20:47

## 2019-01-01 RX ADMIN — BUPIVACAINE HYDROCHLORIDE IN DEXTROSE 2 ML: 7.5 INJECTION, SOLUTION SUBARACHNOID at 12:52

## 2019-01-01 RX ADMIN — SENNOSIDES AND DOCUSATE SODIUM 1 TABLET: 8.6; 5 TABLET ORAL at 00:06

## 2019-01-01 RX ADMIN — METOPROLOL TARTRATE 12.5 MG: 25 TABLET, FILM COATED ORAL at 08:45

## 2019-01-01 RX ADMIN — LISINOPRIL 5 MG: 5 TABLET ORAL at 08:50

## 2019-01-01 RX ADMIN — QUETIAPINE FUMARATE 600 MG: 300 TABLET, EXTENDED RELEASE ORAL at 20:04

## 2019-01-01 RX ADMIN — OXYCODONE HYDROCHLORIDE AND ACETAMINOPHEN 1 TABLET: 5; 325 TABLET ORAL at 03:22

## 2019-01-01 RX ADMIN — TRAMADOL HYDROCHLORIDE 50 MG: 50 TABLET, FILM COATED ORAL at 08:54

## 2019-01-01 RX ADMIN — Medication 0.1 MG: at 04:46

## 2019-01-01 RX ADMIN — DIAZEPAM 5 MG: 5 TABLET ORAL at 12:34

## 2019-01-01 RX ADMIN — MORPHINE SULFATE 1 MG: 2 INJECTION, SOLUTION INTRAMUSCULAR; INTRAVENOUS at 23:51

## 2019-01-01 RX ADMIN — LISINOPRIL 5 MG: 5 TABLET ORAL at 08:51

## 2019-01-01 RX ADMIN — METOPROLOL TARTRATE 12.5 MG: 25 TABLET, FILM COATED ORAL at 22:14

## 2019-01-01 RX ADMIN — MIRTAZAPINE 7.5 MG: 15 TABLET, FILM COATED ORAL at 20:39

## 2019-01-01 RX ADMIN — OXYCODONE HYDROCHLORIDE AND ACETAMINOPHEN 1 TABLET: 5; 325 TABLET ORAL at 12:43

## 2019-01-01 RX ADMIN — QUETIAPINE FUMARATE 600 MG: 300 TABLET, EXTENDED RELEASE ORAL at 01:10

## 2019-01-01 RX ADMIN — ATORVASTATIN CALCIUM 80 MG: 80 TABLET, FILM COATED ORAL at 21:19

## 2019-01-01 RX ADMIN — ENOXAPARIN SODIUM 40 MG: 40 INJECTION SUBCUTANEOUS at 08:13

## 2019-01-01 RX ADMIN — METOPROLOL TARTRATE 12.5 MG: 25 TABLET ORAL at 08:51

## 2019-01-01 RX ADMIN — MORPHINE SULFATE 1 MG: 2 INJECTION, SOLUTION INTRAMUSCULAR; INTRAVENOUS at 10:06

## 2019-01-01 RX ADMIN — MIDAZOLAM HYDROCHLORIDE 2 MG: 1 INJECTION, SOLUTION INTRAMUSCULAR; INTRAVENOUS at 12:48

## 2019-01-01 RX ADMIN — ORPHENADRINE CITRATE 100 MG: 100 TABLET, EXTENDED RELEASE ORAL at 21:19

## 2019-01-01 RX ADMIN — ENOXAPARIN SODIUM 40 MG: 40 INJECTION SUBCUTANEOUS at 08:50

## 2019-01-01 RX ADMIN — ACETAMINOPHEN 650 MG: 325 TABLET ORAL at 13:21

## 2019-01-01 RX ADMIN — SENNOSIDES AND DOCUSATE SODIUM 1 TABLET: 8.6; 5 TABLET ORAL at 19:45

## 2019-01-01 RX ADMIN — OXYCODONE HYDROCHLORIDE 5 MG: 5 TABLET ORAL at 16:59

## 2019-01-01 RX ADMIN — OXYCODONE HYDROCHLORIDE AND ACETAMINOPHEN 1 TABLET: 5; 325 TABLET ORAL at 05:39

## 2019-01-01 RX ADMIN — METOPROLOL TARTRATE 12.5 MG: 25 TABLET, FILM COATED ORAL at 08:10

## 2019-01-01 RX ADMIN — ACETAMINOPHEN 650 MG: 325 TABLET ORAL at 17:41

## 2019-01-01 RX ADMIN — QUETIAPINE FUMARATE 400 MG: 400 TABLET, EXTENDED RELEASE ORAL at 19:44

## 2019-01-01 RX ADMIN — SENNOSIDES AND DOCUSATE SODIUM 1 TABLET: 8.6; 5 TABLET ORAL at 08:10

## 2019-01-01 RX ADMIN — DIAZEPAM 5 MG: 5 TABLET ORAL at 11:09

## 2019-01-01 RX ADMIN — MIRTAZAPINE 7.5 MG: 15 TABLET, FILM COATED ORAL at 21:07

## 2019-01-01 RX ADMIN — SUGAMMADEX 145 MG: 100 INJECTION, SOLUTION INTRAVENOUS at 12:53

## 2019-01-01 RX ADMIN — METOPROLOL TARTRATE 12.5 MG: 25 TABLET ORAL at 20:47

## 2019-01-01 RX ADMIN — ASPIRIN 81 MG 81 MG: 81 TABLET ORAL at 08:51

## 2019-01-01 RX ADMIN — SENNOSIDES AND DOCUSATE SODIUM 1 TABLET: 8.6; 5 TABLET ORAL at 08:19

## 2019-01-01 RX ADMIN — CYANOCOBALAMIN 1000 MCG: 1000 INJECTION, SOLUTION INTRAMUSCULAR; SUBCUTANEOUS at 13:15

## 2019-01-01 RX ADMIN — METOPROLOL TARTRATE 12.5 MG: 25 TABLET, FILM COATED ORAL at 08:47

## 2019-01-01 RX ADMIN — HYDROXYZINE PAMOATE 50 MG: 25 CAPSULE ORAL at 00:06

## 2019-01-01 RX ADMIN — DIAZEPAM 5 MG: 5 TABLET ORAL at 22:04

## 2019-01-01 RX ADMIN — OXYCODONE HYDROCHLORIDE 5 MG: 5 TABLET ORAL at 17:24

## 2019-01-01 RX ADMIN — TRAMADOL HYDROCHLORIDE 50 MG: 50 TABLET, FILM COATED ORAL at 17:07

## 2019-01-01 RX ADMIN — ACETAMINOPHEN 650 MG: 325 TABLET ORAL at 17:46

## 2019-01-01 RX ADMIN — ENOXAPARIN SODIUM 40 MG: 40 INJECTION SUBCUTANEOUS at 10:03

## 2019-01-01 RX ADMIN — CEFAZOLIN SODIUM 2 G: 1 INJECTION, SOLUTION INTRAVENOUS at 00:26

## 2019-01-01 RX ADMIN — MIDAZOLAM HYDROCHLORIDE 2 MG: 1 INJECTION, SOLUTION INTRAMUSCULAR; INTRAVENOUS at 12:50

## 2019-01-01 RX ADMIN — LIDOCAINE HYDROCHLORIDE 0.1 ML: 10 INJECTION, SOLUTION EPIDURAL; INFILTRATION; INTRACAUDAL; PERINEURAL at 10:46

## 2019-01-01 RX ADMIN — ACETAMINOPHEN 500 MG: 500 TABLET ORAL at 10:59

## 2019-01-01 RX ADMIN — VITAMIN D, TAB 1000IU (100/BT) 1000 UNITS: 25 TAB at 12:42

## 2019-01-01 RX ADMIN — ENOXAPARIN SODIUM 40 MG: 40 INJECTION SUBCUTANEOUS at 08:19

## 2019-01-01 RX ADMIN — ACETAMINOPHEN 650 MG: 325 TABLET ORAL at 23:48

## 2019-01-01 RX ADMIN — ENOXAPARIN SODIUM 40 MG: 40 INJECTION SUBCUTANEOUS at 08:54

## 2019-01-01 RX ADMIN — TAMSULOSIN HYDROCHLORIDE 0.4 MG: 0.4 CAPSULE ORAL at 21:07

## 2019-01-01 RX ADMIN — OXYCODONE HYDROCHLORIDE 5 MG: 5 TABLET ORAL at 04:12

## 2019-01-01 RX ADMIN — OXYCODONE HYDROCHLORIDE 5 MG: 5 TABLET ORAL at 02:47

## 2019-01-01 RX ADMIN — LISINOPRIL 5 MG: 5 TABLET ORAL at 08:20

## 2019-01-01 RX ADMIN — ASPIRIN 81 MG 81 MG: 81 TABLET ORAL at 08:50

## 2019-01-01 RX ADMIN — ATORVASTATIN CALCIUM 80 MG: 80 TABLET, FILM COATED ORAL at 21:07

## 2019-01-01 RX ADMIN — ACETAMINOPHEN 500 MG: 500 TABLET ORAL at 01:22

## 2019-01-01 RX ADMIN — QUETIAPINE FUMARATE 400 MG: 400 TABLET, EXTENDED RELEASE ORAL at 20:39

## 2019-01-01 RX ADMIN — ORPHENADRINE CITRATE 100 MG: 100 TABLET, EXTENDED RELEASE ORAL at 21:07

## 2019-01-01 RX ADMIN — VITAMIN D, TAB 1000IU (100/BT) 1000 UNITS: 25 TAB at 13:30

## 2019-01-01 RX ADMIN — HYDROCODONE BITARTRATE AND ACETAMINOPHEN 2 TABLET: 5; 325 TABLET ORAL at 14:31

## 2019-01-01 RX ADMIN — OXYCODONE HYDROCHLORIDE AND ACETAMINOPHEN 1 TABLET: 5; 325 TABLET ORAL at 20:05

## 2019-01-01 RX ADMIN — SODIUM CHLORIDE, POTASSIUM CHLORIDE, SODIUM LACTATE AND CALCIUM CHLORIDE: 600; 310; 30; 20 INJECTION, SOLUTION INTRAVENOUS at 10:47

## 2019-01-01 RX ADMIN — DIAZEPAM 5 MG: 5 TABLET ORAL at 22:32

## 2019-01-01 RX ADMIN — TRAMADOL HYDROCHLORIDE 50 MG: 50 TABLET, FILM COATED ORAL at 13:30

## 2019-01-01 RX ADMIN — TRAMADOL HYDROCHLORIDE 50 MG: 50 TABLET, FILM COATED ORAL at 10:02

## 2019-01-01 RX ADMIN — ASPIRIN 81 MG 81 MG: 81 TABLET ORAL at 08:45

## 2019-01-01 RX ADMIN — QUETIAPINE FUMARATE 400 MG: 400 TABLET, EXTENDED RELEASE ORAL at 21:07

## 2019-01-01 RX ADMIN — TAMSULOSIN HYDROCHLORIDE 0.4 MG: 0.4 CAPSULE ORAL at 20:39

## 2019-01-01 RX ADMIN — OXYCODONE HYDROCHLORIDE 5 MG: 5 TABLET ORAL at 20:02

## 2019-01-01 RX ADMIN — ATORVASTATIN CALCIUM 80 MG: 80 TABLET, FILM COATED ORAL at 20:05

## 2019-01-01 RX ADMIN — LISINOPRIL 5 MG: 5 TABLET ORAL at 09:51

## 2019-01-01 RX ADMIN — SENNOSIDES AND DOCUSATE SODIUM 1 TABLET: 8.6; 5 TABLET ORAL at 10:01

## 2019-01-01 RX ADMIN — ACETAMINOPHEN 650 MG: 325 TABLET ORAL at 06:59

## 2019-01-01 RX ADMIN — OXYCODONE HYDROCHLORIDE 5 MG: 5 TABLET ORAL at 21:24

## 2019-01-01 RX ADMIN — ASPIRIN 81 MG 81 MG: 81 TABLET ORAL at 08:54

## 2019-01-01 RX ADMIN — ATORVASTATIN CALCIUM 80 MG: 80 TABLET, FILM COATED ORAL at 20:39

## 2019-01-01 RX ADMIN — PROPOFOL 80 MCG/KG/MIN: 10 INJECTION, EMULSION INTRAVENOUS at 13:08

## 2019-01-01 RX ADMIN — TRAMADOL HYDROCHLORIDE 50 MG: 50 TABLET, FILM COATED ORAL at 17:24

## 2019-01-01 RX ADMIN — ROCURONIUM BROMIDE 50 MG: 10 INJECTION, SOLUTION INTRAVENOUS at 11:59

## 2019-01-01 RX ADMIN — KETOROLAC TROMETHAMINE 30 MG: 30 INJECTION, SOLUTION INTRAMUSCULAR; INTRAVENOUS at 10:49

## 2019-01-01 RX ADMIN — QUETIAPINE FUMARATE 400 MG: 400 TABLET, EXTENDED RELEASE ORAL at 22:52

## 2019-01-01 RX ADMIN — QUETIAPINE FUMARATE 600 MG: 300 TABLET, EXTENDED RELEASE ORAL at 22:04

## 2019-01-01 RX ADMIN — LISINOPRIL 5 MG: 2.5 TABLET ORAL at 08:48

## 2019-01-01 RX ADMIN — TAMSULOSIN HYDROCHLORIDE 0.4 MG: 0.4 CAPSULE ORAL at 21:19

## 2019-01-01 RX ADMIN — METOPROLOL TARTRATE 12.5 MG: 25 TABLET, FILM COATED ORAL at 08:53

## 2019-01-01 RX ADMIN — DEXAMETHASONE SODIUM PHOSPHATE 5 MG: 10 INJECTION INTRAMUSCULAR; INTRAVENOUS at 12:09

## 2019-01-01 RX ADMIN — ASPIRIN 81 MG 81 MG: 81 TABLET ORAL at 08:49

## 2019-01-01 RX ADMIN — LIDOCAINE HYDROCHLORIDE 100 MG: 20 INJECTION, SOLUTION INTRAVENOUS at 13:01

## 2019-01-01 RX ADMIN — HYDROXYZINE PAMOATE 50 MG: 50 CAPSULE ORAL at 08:45

## 2019-01-01 RX ADMIN — SENNOSIDES AND DOCUSATE SODIUM 1 TABLET: 8.6; 5 TABLET ORAL at 21:07

## 2019-01-01 RX ADMIN — ATORVASTATIN CALCIUM 80 MG: 80 TABLET, FILM COATED ORAL at 19:43

## 2019-01-01 RX ADMIN — SENNOSIDES AND DOCUSATE SODIUM 1 TABLET: 8.6; 5 TABLET ORAL at 09:51

## 2019-01-01 RX ADMIN — FENTANYL CITRATE 50 MCG: 50 INJECTION, SOLUTION INTRAMUSCULAR; INTRAVENOUS at 12:18

## 2019-01-01 RX ADMIN — ONDANSETRON 4 MG: 2 INJECTION INTRAMUSCULAR; INTRAVENOUS at 19:33

## 2019-01-01 RX ADMIN — ATORVASTATIN CALCIUM 80 MG: 80 TABLET, FILM COATED ORAL at 00:07

## 2019-01-01 RX ADMIN — FENTANYL CITRATE 50 MCG: 50 INJECTION, SOLUTION INTRAMUSCULAR; INTRAVENOUS at 12:15

## 2019-01-01 RX ADMIN — SODIUM CHLORIDE: 9 INJECTION, SOLUTION INTRAVENOUS at 09:51

## 2019-01-01 RX ADMIN — SENNOSIDES AND DOCUSATE SODIUM 1 TABLET: 8.6; 5 TABLET ORAL at 20:14

## 2019-01-01 RX ADMIN — DIAZEPAM 5 MG: 5 TABLET ORAL at 08:19

## 2019-01-01 RX ADMIN — DIAZEPAM 5 MG: 5 TABLET ORAL at 19:43

## 2019-01-01 RX ADMIN — FENTANYL CITRATE 100 MCG: 50 INJECTION, SOLUTION INTRAMUSCULAR; INTRAVENOUS at 04:50

## 2019-01-01 RX ADMIN — LISINOPRIL 5 MG: 2.5 TABLET ORAL at 08:10

## 2019-01-01 RX ADMIN — KETOROLAC TROMETHAMINE 30 MG: 30 INJECTION, SOLUTION INTRAMUSCULAR at 08:50

## 2019-01-01 RX ADMIN — IOPAMIDOL 100 ML: 612 INJECTION, SOLUTION INTRAVENOUS at 05:06

## 2019-01-01 RX ADMIN — ENOXAPARIN SODIUM 40 MG: 40 INJECTION SUBCUTANEOUS at 08:51

## 2019-01-01 RX ADMIN — SODIUM CHLORIDE, POTASSIUM CHLORIDE, SODIUM LACTATE AND CALCIUM CHLORIDE: 600; 310; 30; 20 INJECTION, SOLUTION INTRAVENOUS at 13:17

## 2019-01-01 RX ADMIN — METOPROLOL TARTRATE 12.5 MG: 25 TABLET, FILM COATED ORAL at 10:01

## 2019-01-01 RX ADMIN — DIAZEPAM 5 MG: 5 TABLET ORAL at 08:50

## 2019-01-01 RX ADMIN — ASPIRIN 81 MG 81 MG: 81 TABLET ORAL at 08:11

## 2019-01-01 RX ADMIN — ENOXAPARIN SODIUM 40 MG: 40 INJECTION SUBCUTANEOUS at 08:44

## 2019-01-01 RX ADMIN — MIRTAZAPINE 7.5 MG: 15 TABLET, FILM COATED ORAL at 22:14

## 2019-01-01 RX ADMIN — DIAZEPAM 5 MG: 5 TABLET ORAL at 14:34

## 2019-01-01 RX ADMIN — SENNOSIDES AND DOCUSATE SODIUM 1 TABLET: 8.6; 5 TABLET ORAL at 08:54

## 2019-01-01 RX ADMIN — OXYCODONE HYDROCHLORIDE 5 MG: 5 TABLET ORAL at 08:49

## 2019-01-01 RX ADMIN — ACETAMINOPHEN 650 MG: 325 TABLET ORAL at 23:59

## 2019-01-01 RX ADMIN — ERGOCALCIFEROL 50000 UNITS: 1.25 CAPSULE ORAL at 13:51

## 2019-01-01 RX ADMIN — MIRTAZAPINE 7.5 MG: 15 TABLET, FILM COATED ORAL at 20:05

## 2019-01-01 RX ADMIN — ENOXAPARIN SODIUM 40 MG: 40 INJECTION SUBCUTANEOUS at 09:51

## 2019-01-01 RX ADMIN — QUETIAPINE FUMARATE 400 MG: 400 TABLET, EXTENDED RELEASE ORAL at 21:19

## 2019-01-01 RX ADMIN — VITAMIN D, TAB 1000IU (100/BT) 1000 UNITS: 25 TAB at 11:54

## 2019-01-01 RX ADMIN — ONDANSETRON 4 MG: 2 INJECTION INTRAMUSCULAR; INTRAVENOUS at 12:42

## 2019-01-01 RX ADMIN — ORPHENADRINE CITRATE 100 MG: 100 TABLET, EXTENDED RELEASE ORAL at 21:41

## 2019-01-01 RX ADMIN — TRAMADOL HYDROCHLORIDE 50 MG: 50 TABLET, FILM COATED ORAL at 12:31

## 2019-01-01 RX ADMIN — Medication 10 ML: at 00:08

## 2019-01-01 RX ADMIN — METOPROLOL TARTRATE 12.5 MG: 25 TABLET, FILM COATED ORAL at 21:37

## 2019-01-01 RX ADMIN — TAMSULOSIN HYDROCHLORIDE 0.4 MG: 0.4 CAPSULE ORAL at 08:20

## 2019-01-01 SDOH — SOCIAL STABILITY: SOCIAL INSECURITY
WITHIN THE LAST YEAR, HAVE YOU BEEN KICKED, HIT, SLAPPED, OR OTHERWISE PHYSICALLY HURT BY YOUR PARTNER OR EX-PARTNER?: NO

## 2019-01-01 SDOH — SOCIAL STABILITY: SOCIAL NETWORK: HOW OFTEN DO YOU ATTEND CHURCH OR RELIGIOUS SERVICES?: 1 TO 4 TIMES PER YEAR

## 2019-01-01 SDOH — HEALTH STABILITY: PHYSICAL HEALTH: ON AVERAGE, HOW MANY DAYS PER WEEK DO YOU ENGAGE IN MODERATE TO STRENUOUS EXERCISE (LIKE A BRISK WALK)?: 0 DAYS

## 2019-01-01 SDOH — SOCIAL STABILITY: SOCIAL NETWORK: IN A TYPICAL WEEK, HOW MANY TIMES DO YOU TALK ON THE PHONE WITH FAMILY, FRIENDS, OR NEIGHBORS?: TWICE A WEEK

## 2019-01-01 SDOH — ECONOMIC STABILITY: TRANSPORTATION INSECURITY
IN THE PAST 12 MONTHS, HAS THE LACK OF TRANSPORTATION KEPT YOU FROM MEDICAL APPOINTMENTS OR FROM GETTING MEDICATIONS?: YES

## 2019-01-01 SDOH — HEALTH STABILITY: PHYSICAL HEALTH: ON AVERAGE, HOW MANY MINUTES DO YOU ENGAGE IN EXERCISE AT THIS LEVEL?: 30 MIN

## 2019-01-01 SDOH — HEALTH STABILITY: PHYSICAL HEALTH: ON AVERAGE, HOW MANY DAYS PER WEEK DO YOU ENGAGE IN MODERATE TO STRENUOUS EXERCISE (LIKE A BRISK WALK)?: 2 DAYS

## 2019-01-01 SDOH — SOCIAL STABILITY: SOCIAL NETWORK
DO YOU BELONG TO ANY CLUBS OR ORGANIZATIONS SUCH AS CHURCH GROUPS UNIONS, FRATERNAL OR ATHLETIC GROUPS, OR SCHOOL GROUPS?: NO

## 2019-01-01 SDOH — ECONOMIC STABILITY: INCOME INSECURITY: HOW HARD IS IT FOR YOU TO PAY FOR THE VERY BASICS LIKE FOOD, HOUSING, MEDICAL CARE, AND HEATING?: VERY HARD

## 2019-01-01 SDOH — HEALTH STABILITY: MENTAL HEALTH
STRESS IS WHEN SOMEONE FEELS TENSE, NERVOUS, ANXIOUS, OR CAN'T SLEEP AT NIGHT BECAUSE THEIR MIND IS TROUBLED. HOW STRESSED ARE YOU?: TO SOME EXTENT

## 2019-01-01 SDOH — ECONOMIC STABILITY: FOOD INSECURITY: WITHIN THE PAST 12 MONTHS, YOU WORRIED THAT YOUR FOOD WOULD RUN OUT BEFORE YOU GOT MONEY TO BUY MORE.: SOMETIMES TRUE

## 2019-01-01 SDOH — ECONOMIC STABILITY: TRANSPORTATION INSECURITY
IN THE PAST 12 MONTHS, HAS LACK OF TRANSPORTATION KEPT YOU FROM MEETINGS, WORK, OR FROM GETTING THINGS NEEDED FOR DAILY LIVING?: YES

## 2019-01-01 SDOH — HEALTH STABILITY: PHYSICAL HEALTH: ON AVERAGE, HOW MANY MINUTES DO YOU ENGAGE IN EXERCISE AT THIS LEVEL?: 0 MIN

## 2019-01-01 SDOH — ECONOMIC STABILITY: FOOD INSECURITY: WITHIN THE PAST 12 MONTHS, THE FOOD YOU BOUGHT JUST DIDN'T LAST AND YOU DIDN'T HAVE MONEY TO GET MORE.: SOMETIMES TRUE

## 2019-01-01 SDOH — SOCIAL STABILITY: SOCIAL INSECURITY
WITHIN THE LAST YEAR, HAVE TO BEEN RAPED OR FORCED TO HAVE ANY KIND OF SEXUAL ACTIVITY BY YOUR PARTNER OR EX-PARTNER?: NO

## 2019-01-01 SDOH — SOCIAL STABILITY: SOCIAL INSECURITY: WITHIN THE LAST YEAR, HAVE YOU BEEN HUMILIATED OR EMOTIONALLY ABUSED IN OTHER WAYS BY YOUR PARTNER OR EX-PARTNER?: NO

## 2019-01-01 SDOH — SOCIAL STABILITY: SOCIAL INSECURITY: WITHIN THE LAST YEAR, HAVE YOU BEEN AFRAID OF YOUR PARTNER OR EX-PARTNER?: NO

## 2019-01-01 SDOH — SOCIAL STABILITY: SOCIAL NETWORK: ARE YOU MARRIED, WIDOWED, DIVORCED, SEPARATED, NEVER MARRIED, OR LIVING WITH A PARTNER?: DIVORCED

## 2019-01-01 SDOH — SOCIAL STABILITY: SOCIAL NETWORK: HOW OFTEN DO YOU GET TOGETHER WITH FRIENDS OR RELATIVES?: ONCE A WEEK

## 2019-01-01 SDOH — SOCIAL STABILITY: SOCIAL NETWORK: HOW OFTEN DO YOU ATTENT MEETINGS OF THE CLUB OR ORGANIZATION YOU BELONG TO?: NEVER

## 2019-01-01 ASSESSMENT — PAIN SCALES - GENERAL
PAINLEVEL_OUTOF10: 9
PAINLEVEL_OUTOF10: 8
PAINLEVEL_OUTOF10: 10
PAINLEVEL_OUTOF10: 9
PAINLEVEL_OUTOF10: 9
PAINLEVEL_OUTOF10: 7
PAINLEVEL_OUTOF10: 8
PAINLEVEL_OUTOF10: 7
PAINLEVEL_OUTOF10: 3
PAINLEVEL_OUTOF10: 9
PAINLEVEL_OUTOF10: 8
PAINLEVEL_OUTOF10: 10
PAINLEVEL_OUTOF10: 7
PAINLEVEL_OUTOF10: 9
PAINLEVEL_OUTOF10: 9
PAINLEVEL_OUTOF10: 7
PAINLEVEL_OUTOF10: 10
PAINLEVEL_OUTOF10: 8
PAINLEVEL_OUTOF10: 10
PAINLEVEL_OUTOF10: 5
PAINLEVEL_OUTOF10: 10
PAINLEVEL_OUTOF10: 8
PAINLEVEL_OUTOF10: 7
PAINLEVEL_OUTOF10: 0
PAINLEVEL_OUTOF10: 10
PAINLEVEL_OUTOF10: 9
PAINLEVEL_OUTOF10: 5
PAINLEVEL_OUTOF10: 10
PAINLEVEL_OUTOF10: 7
PAINLEVEL_OUTOF10: 6
PAINLEVEL_OUTOF10: 6
PAINLEVEL_OUTOF10: 7
PAINLEVEL_OUTOF10: 7
PAINLEVEL_OUTOF10: 8
PAINLEVEL_OUTOF10: 10
PAINLEVEL_OUTOF10: 7
PAINLEVEL_OUTOF10: 0
PAINLEVEL_OUTOF10: 8
PAINLEVEL_OUTOF10: 10
PAINLEVEL_OUTOF10: 7
PAINLEVEL_OUTOF10: 10
PAINLEVEL_OUTOF10: 7
PAINLEVEL_OUTOF10: 0
PAINLEVEL_OUTOF10: 7
PAINLEVEL_OUTOF10: 8
PAINLEVEL_OUTOF10: 0
PAINLEVEL_OUTOF10: 9
PAINLEVEL_OUTOF10: 8
PAINLEVEL_OUTOF10: 8
PAINLEVEL_OUTOF10: 7
PAINLEVEL_OUTOF10: 10
PAINLEVEL_OUTOF10: 10
PAINLEVEL_OUTOF10: 6
PAINLEVEL_OUTOF10: 10
PAINLEVEL_OUTOF10: 5
PAINLEVEL_OUTOF10: 9
PAINLEVEL_OUTOF10: 7
PAINLEVEL_OUTOF10: 10
PAINLEVEL_OUTOF10: 8
PAINLEVEL_OUTOF10: 7
PAINLEVEL_OUTOF10: 7
PAINLEVEL_OUTOF10: 9
PAINLEVEL_OUTOF10: 7
PAINLEVEL_OUTOF10: 10
PAINLEVEL_OUTOF10: 9
PAINLEVEL_OUTOF10: 8
PAINLEVEL_OUTOF10: 0
PAINLEVEL_OUTOF10: 6
PAINLEVEL_OUTOF10: 9
PAINLEVEL_OUTOF10: 7
PAINLEVEL_OUTOF10: 6
PAINLEVEL_OUTOF10: 7
PAINLEVEL_OUTOF10: 9
PAINLEVEL_OUTOF10: 8
PAINLEVEL_OUTOF10: 7
PAINLEVEL_OUTOF10: 10
PAINLEVEL_OUTOF10: 0
PAINLEVEL_OUTOF10: 7
PAINLEVEL_OUTOF10: 10
PAINLEVEL_OUTOF10: 7
PAINLEVEL_OUTOF10: 10
PAINLEVEL_OUTOF10: 9
PAINLEVEL_OUTOF10: 0
PAINLEVEL_OUTOF10: 7
PAINLEVEL_OUTOF10: 7
PAINLEVEL_OUTOF10: 9
PAINLEVEL_OUTOF10: 0
PAINLEVEL_OUTOF10: 8
PAINLEVEL_OUTOF10: 10
PAINLEVEL_OUTOF10: 9
PAINLEVEL_OUTOF10: 7
PAINLEVEL_OUTOF10: 7
PAINLEVEL_OUTOF10: 10
PAINLEVEL_OUTOF10: 8
PAINLEVEL_OUTOF10: 9
PAINLEVEL_OUTOF10: 7
PAINLEVEL_OUTOF10: 10

## 2019-01-01 ASSESSMENT — PULMONARY FUNCTION TESTS
PIF_VALUE: 16
PIF_VALUE: 1
PIF_VALUE: 0
PIF_VALUE: 14
PIF_VALUE: 1
PIF_VALUE: 1
PIF_VALUE: 15
PIF_VALUE: 1
PIF_VALUE: 15
PIF_VALUE: 1
PIF_VALUE: 1
PIF_VALUE: 0
PIF_VALUE: 1
PIF_VALUE: 14
PIF_VALUE: 1
PIF_VALUE: 15
PIF_VALUE: 17
PIF_VALUE: 1
PIF_VALUE: 1
PIF_VALUE: 12
PIF_VALUE: 1
PIF_VALUE: 14
PIF_VALUE: 1
PIF_VALUE: 1
PIF_VALUE: 12
PIF_VALUE: 1
PIF_VALUE: 1
PIF_VALUE: 15
PIF_VALUE: 14
PIF_VALUE: 12
PIF_VALUE: 15
PIF_VALUE: 1
PIF_VALUE: 17
PIF_VALUE: 1
PIF_VALUE: 1
PIF_VALUE: 17
PIF_VALUE: 11
PIF_VALUE: 1
PIF_VALUE: 2
PIF_VALUE: 1
PIF_VALUE: 20
PIF_VALUE: 14
PIF_VALUE: 0
PIF_VALUE: 1
PIF_VALUE: 16
PIF_VALUE: 17
PIF_VALUE: 1
PIF_VALUE: 1
PIF_VALUE: 16
PIF_VALUE: 1
PIF_VALUE: 1
PIF_VALUE: 17
PIF_VALUE: 17
PIF_VALUE: 1
PIF_VALUE: 12
PIF_VALUE: 1
PIF_VALUE: 16
PIF_VALUE: 21
PIF_VALUE: 17
PIF_VALUE: 1
PIF_VALUE: 12
PIF_VALUE: 1
PIF_VALUE: 1
PIF_VALUE: 15
PIF_VALUE: 1
PIF_VALUE: 1
PIF_VALUE: 14
PIF_VALUE: 12
PIF_VALUE: 12
PIF_VALUE: 15
PIF_VALUE: 1
PIF_VALUE: 3
PIF_VALUE: 16
PIF_VALUE: 1
PIF_VALUE: 15
PIF_VALUE: 11
PIF_VALUE: 17
PIF_VALUE: 1
PIF_VALUE: 1
PIF_VALUE: 16
PIF_VALUE: 11
PIF_VALUE: 1
PIF_VALUE: 1
PIF_VALUE: 15
PIF_VALUE: 1
PIF_VALUE: 15
PIF_VALUE: 11
PIF_VALUE: 17
PIF_VALUE: 15
PIF_VALUE: 20
PIF_VALUE: 15
PIF_VALUE: 1
PIF_VALUE: 11
PIF_VALUE: 1
PIF_VALUE: 12
PIF_VALUE: 16
PIF_VALUE: 16
PIF_VALUE: 1
PIF_VALUE: 12
PIF_VALUE: 11
PIF_VALUE: 16
PIF_VALUE: 23
PIF_VALUE: 16
PIF_VALUE: 1
PIF_VALUE: 12
PIF_VALUE: 15
PIF_VALUE: 1
PIF_VALUE: 15

## 2019-01-01 ASSESSMENT — ENCOUNTER SYMPTOMS
ABDOMINAL PAIN: 0
BLOOD IN STOOL: 0
DIARRHEA: 0
BACK PAIN: 1
EYE PAIN: 0
GASTROINTESTINAL NEGATIVE: 1
VOMITING: 0
STRIDOR: 0
CHOKING: 0
BLOOD IN STOOL: 0
NAUSEA: 1
EYE DISCHARGE: 0
APNEA: 0
SHORTNESS OF BREATH: 0
ABDOMINAL PAIN: 0
RESPIRATORY NEGATIVE: 1
VOMITING: 0
ALLERGIC/IMMUNOLOGIC NEGATIVE: 1
SORE THROAT: 0
APNEA: 0
SHORTNESS OF BREATH: 0
CHEST TIGHTNESS: 0
VOMITING: 0
EYE REDNESS: 0
VOMITING: 0
ABDOMINAL DISTENTION: 0
CHEST TIGHTNESS: 0
ABDOMINAL PAIN: 0
COUGH: 0
SORE THROAT: 0
SHORTNESS OF BREATH: 1
RHINORRHEA: 0
DIARRHEA: 0
EYE PAIN: 0
ABDOMINAL DISTENTION: 0
DIARRHEA: 0
WHEEZING: 0
PHOTOPHOBIA: 0
SHORTNESS OF BREATH: 0
SHORTNESS OF BREATH: 0
NAUSEA: 0
ABDOMINAL PAIN: 1
CONSTIPATION: 0
ABDOMINAL PAIN: 0
DIARRHEA: 0
EYE REDNESS: 0
PHOTOPHOBIA: 0
BLOOD IN STOOL: 0
ABDOMINAL PAIN: 0
WHEEZING: 0
SHORTNESS OF BREATH: 1
ABDOMINAL DISTENTION: 0
COLOR CHANGE: 0
STRIDOR: 0
EYE PAIN: 0
BACK PAIN: 0
NAUSEA: 0
EYES NEGATIVE: 1
BACK PAIN: 1
RHINORRHEA: 0
SHORTNESS OF BREATH: 0
COUGH: 0
RHINORRHEA: 0
STRIDOR: 0
CONSTIPATION: 1
ALLERGIC/IMMUNOLOGIC NEGATIVE: 1
SORE THROAT: 0
NAUSEA: 0
SORE THROAT: 0
COUGH: 0
COUGH: 0
DIARRHEA: 0
BLOOD IN STOOL: 0
PHOTOPHOBIA: 0
BLOOD IN STOOL: 0
NAUSEA: 0
RHINORRHEA: 0
COUGH: 0
COLOR CHANGE: 0
COUGH: 0
WHEEZING: 0
CONSTIPATION: 1
CHEST TIGHTNESS: 0
EYES NEGATIVE: 1
COLOR CHANGE: 0
VOMITING: 0
BACK PAIN: 1
TROUBLE SWALLOWING: 0
COLOR CHANGE: 0
EYE PAIN: 0
SORE THROAT: 0
SHORTNESS OF BREATH: 1
NAUSEA: 0
NAUSEA: 0
COLOR CHANGE: 0
ABDOMINAL PAIN: 0
DIARRHEA: 0
VOMITING: 0

## 2019-01-01 ASSESSMENT — PAIN DESCRIPTION - ORIENTATION
ORIENTATION: LEFT
ORIENTATION: RIGHT;MID
ORIENTATION: LEFT
ORIENTATION: LEFT

## 2019-01-01 ASSESSMENT — PAIN DESCRIPTION - DESCRIPTORS
DESCRIPTORS: ACHING
DESCRIPTORS: ACHING
DESCRIPTORS: ACHING;THROBBING
DESCRIPTORS: ACHING;RADIATING;SHARP;SHOOTING
DESCRIPTORS: ACHING;THROBBING
DESCRIPTORS: ACHING;THROBBING
DESCRIPTORS: ACHING
DESCRIPTORS: ACHING;THROBBING
DESCRIPTORS: STABBING
DESCRIPTORS: CONSTANT;DULL;SHARP
DESCRIPTORS: ACHING;THROBBING
DESCRIPTORS: ACHING
DESCRIPTORS: ACHING;THROBBING
DESCRIPTORS: ACHING;THROBBING
DESCRIPTORS: ACHING
DESCRIPTORS: ACHING;THROBBING
DESCRIPTORS: ACHING;SORE;THROBBING
DESCRIPTORS: ACHING
DESCRIPTORS: ACHING

## 2019-01-01 ASSESSMENT — PAIN DESCRIPTION - PAIN TYPE
TYPE: SURGICAL PAIN
TYPE: SURGICAL PAIN
TYPE: SURGICAL PAIN;ACUTE PAIN
TYPE: ACUTE PAIN
TYPE: SURGICAL PAIN
TYPE: ACUTE PAIN
TYPE: ACUTE PAIN;SURGICAL PAIN
TYPE: SURGICAL PAIN
TYPE: ACUTE PAIN;SURGICAL PAIN
TYPE: SURGICAL PAIN
TYPE: ACUTE PAIN

## 2019-01-01 ASSESSMENT — PAIN DESCRIPTION - FREQUENCY
FREQUENCY: CONTINUOUS
FREQUENCY: INTERMITTENT
FREQUENCY: CONTINUOUS
FREQUENCY: INTERMITTENT
FREQUENCY: CONTINUOUS
FREQUENCY: INTERMITTENT
FREQUENCY: INTERMITTENT
FREQUENCY: CONTINUOUS

## 2019-01-01 ASSESSMENT — PAIN DESCRIPTION - LOCATION
LOCATION: HIP
LOCATION: ABDOMEN
LOCATION: HIP

## 2019-01-01 ASSESSMENT — LIFESTYLE VARIABLES: SMOKING_STATUS: 1

## 2019-01-01 ASSESSMENT — PAIN DESCRIPTION - PROGRESSION
CLINICAL_PROGRESSION: GRADUALLY IMPROVING
CLINICAL_PROGRESSION: GRADUALLY WORSENING
CLINICAL_PROGRESSION: GRADUALLY IMPROVING
CLINICAL_PROGRESSION: GRADUALLY IMPROVING
CLINICAL_PROGRESSION: NOT CHANGED
CLINICAL_PROGRESSION: GRADUALLY IMPROVING
CLINICAL_PROGRESSION: RAPIDLY WORSENING
CLINICAL_PROGRESSION: NOT CHANGED
CLINICAL_PROGRESSION: GRADUALLY IMPROVING
CLINICAL_PROGRESSION: RAPIDLY WORSENING

## 2019-01-01 ASSESSMENT — PAIN DESCRIPTION - ONSET
ONSET: ON-GOING
ONSET: GRADUAL
ONSET: ON-GOING

## 2019-01-01 ASSESSMENT — PAIN - FUNCTIONAL ASSESSMENT
PAIN_FUNCTIONAL_ASSESSMENT: ACTIVITIES ARE NOT PREVENTED
PAIN_FUNCTIONAL_ASSESSMENT: 0-10

## 2019-01-31 PROBLEM — K42.9 UMBILICAL HERNIA WITHOUT OBSTRUCTION AND WITHOUT GANGRENE: Status: ACTIVE | Noted: 2019-01-01

## 2019-04-01 NOTE — ED PROVIDER NOTES
[facility] ED  eMERGENCY dEPARTMENT eNCOUnter      Pt Name: Faina Seals  MRN: [id number]  [place] 1960  Date of evaluation: 4/1/2019  Provider: Leanne Chaves PA-C      HISTORY OF PRESENT ILLNESS    Eliseo Joaquin is a 60 y.o. male who presents to the Emergency Department with abdominal pain. This has been present for last 3 days. Pain is severe. It is sharp and stabbing. Pain is right sided. Pt had cholecystectomy 2 weeks ago and was d/c home from hospital in [place] 3 days ago. Pt also had right inguinal hernia repair 1 month ago by dr Shruthi Hernandez. Pt took motrin and toradol at home for pain. Pt also admits to using heroin last night bc the pain was so severe. He has a h/o drug abuse and was supposed to start tx in [place] with vivatrol but then had the gallbladder issues. Pt also reports some chest pain with deep breaths and exertion. Pt admits to riding a bike today which he probably shouldn't have which made his pain worse. REVIEW OF SYSTEMS       Review of Systems   Constitutional: Negative for chills, diaphoresis, fatigue and fever. HENT: Negative for congestion, rhinorrhea and sore throat. Eyes: Negative for photophobia and pain. Respiratory: Positive for shortness of breath. Negative for cough. Cardiovascular: Positive for chest pain. Negative for palpitations. Gastrointestinal: Positive for abdominal pain and nausea. Negative for diarrhea and vomiting. Genitourinary: Negative for dysuria and flank pain. Musculoskeletal: Negative for back pain. Skin: Negative for rash. Neurological: Negative for dizziness, light-headedness and headaches. Psychiatric/Behavioral: Negative. All other systems reviewed and are negative.         PAST MEDICAL HISTORY     Past Medical History:   Diagnosis Date    Abnormality of gait and mobility     Alcohol abuse 2014    Anxiety     Ataxia     CAD (coronary artery disease)     CHF (congestive heart failure) lisinopril (PRINIVIL;ZESTRIL) 2.5 MG tablet Take 1 tablet by mouth daily, Disp-30 tablet, R-3Normal      aspirin 81 MG tablet Take 81 mg by mouth dailyHistorical Med      Cholecalciferol (VITAMIN D3) 2000 UNITS CAPS Take 2,000 Units by mouth dailyHistorical Med      clopidogrel (PLAVIX) 75 MG tablet Take 75 mg by mouth dailyHistorical Med      albuterol (PROAIR HFA) 108 (90 BASE) MCG/ACT inhaler Inhale 2 puffs into the lungs every 6 hours as needed for Wheezing., Disp-1 Inhaler, R-6             ALLERGIES     Patient has no known allergies.     FAMILY HISTORY       Family History   Family history unknown: Yes          SOCIAL HISTORY       Social History     Socioeconomic History    Marital status:      Spouse name: None    Number of children: 1    Years of education: 15    Highest education level: None   Occupational History    None   Social Needs    Financial resource strain: None    Food insecurity:     Worry: None     Inability: None    Transportation needs:     Medical: None     Non-medical: None   Tobacco Use    Smoking status: Current Every Day Smoker     Packs/day: 2.00     Years: 45.00     Pack years: 90.00     Types: Cigarettes    Smokeless tobacco: Current User   Substance and Sexual Activity    Alcohol use: No     Comment: social    Drug use: Yes     Frequency: 1.0 times per week     Types: Opiates , Cocaine, Other-see comments     Comment: heroin, crack, xanax    Sexual activity: Yes     Partners: Female   Lifestyle    Physical activity:     Days per week: None     Minutes per session: None    Stress: None   Relationships    Social connections:     Talks on phone: None     Gets together: None     Attends [employer] service: None     Active member of club or organization: None     Attends meetings of clubs or organizations: None     Relationship status: None    Intimate partner violence:     Fear of current or ex partner: None     Emotionally abused: None     Physically abused: None     Forced sexual activity: None   Other Topics Concern    None   Social History Narrative    The patient lives with a friend in a two story home with bedrooms and bathrooms on both levels. His social support includes his friend. He has a walker and a cane at home. It is not indicated that he was receiving community services prior to admission. He wears eye glasses and upper dentures. He does not have history of falls prior to admission. He was independent with mobility and self care prior to admission. His goal is to feel better. SCREENINGS      @FLOW([phone])@      PHYSICAL EXAM    (up to 7 for level 4, 8 or more for level 5)     ED Triage Vitals [04/01/19 0402]   BP Temp Temp Source Pulse Resp SpO2 Height Weight   (!) 128/104 98.5 °F (36.9 °C) Oral 116 20 100 % -- 167 lb (75.8 kg)       Physical Exam   Constitutional: He is oriented to person, place, and time. He appears well-developed and well-nourished. He is active. No distress. HENT:   Head: Normocephalic and atraumatic. Mouth/Throat: Mucous membranes are normal.   Eyes: Conjunctivae and lids are normal.   Neck: Normal range of motion. Neck supple. Cardiovascular: Regular rhythm, normal heart sounds, intact distal pulses and normal pulses. Tachycardia present. Pulmonary/Chest: Effort normal and breath sounds normal.   Abdominal: Soft. Normal appearance and bowel sounds are normal. There is generalized tenderness and tenderness in the right upper quadrant and epigastric area. There is no rigidity, no rebound, no guarding and no CVA tenderness. Lymphadenopathy:     He has no cervical adenopathy. Neurological: He is alert and oriented to person, place, and time. He has normal strength. Skin: Skin is warm, dry and intact. Capillary refill takes less than 2 seconds. No rash noted. He is not diaphoretic. Psychiatric: Judgment and thought content normal.   Nursing note and vitals reviewed.         All other labs were within normal

## 2019-04-01 NOTE — ED NOTES
NORBERT Rodriguez  in to talk with patient.  Ice water given by this RN     Jocelyne Cordova, BLOSSOM  04/01/19 0926

## 2019-04-01 NOTE — ED TRIAGE NOTES
Pt presents via [facility] with c/o abd pain. Onset approx 1week. Pt reports cholecystectomy x2 weeks ago. Pt a&o x4. resp even,unlabored. Skin p/w/d. Pt afebrile. Pt reports pain to R mid abd. Reports nausea, no active emesis. Pt states, \"I do not want any narcotics. \" pt self admin toradol and nausea med prior to arrival with minimal relief.

## 2019-08-01 PROBLEM — S72.92XA CLOSED FRACTURE OF LEFT FEMUR (HCC): Status: ACTIVE | Noted: 2019-01-01

## 2019-08-01 NOTE — ED NOTES
Bed: 08  Expected date: 8/1/19  Expected time:   Means of arrival:   Comments:  66 MALE FELL, + LOC.      Jefe Sellers RN  08/01/19 1911

## 2019-08-02 NOTE — PROGRESS NOTES
08/01/2019    SPECGRAV 1.037 08/01/2019    GLUCOSEU Negative 08/01/2019    GLUCOSEU NEG 02/15/2012       Radiology:  XR HIP LEFT (1 VIEW)   Final Result      Postoperative changes without complication. RADIOLOGY REPORT   Final Result      XR CHEST (SINGLE VIEW FRONTAL)   Final Result   NO ACUTE ACTIVE CARDIOPULMONARY PROCESS      XR FEMUR LEFT (MIN 2 VIEWS)   Final Result   SUBCAPITAL FEMORAL NECK FRACTURE      CT Head WO Contrast   Final Result   FINAL IMPRESSION:      NO ACUTE INTRACRANIAL PROCESS, FRACTURE, OR EVIDENCE OF CERVICAL SPINE INJURY IDENTIFIED. CT CERVICAL SPINE WO CONTRAST   Final Result   FINAL IMPRESSION:      NO ACUTE INTRACRANIAL PROCESS, FRACTURE, OR EVIDENCE OF CERVICAL SPINE INJURY IDENTIFIED. CT ABDOMEN PELVIS W IV CONTRAST Additional Contrast? None   Final Result   1. COMMINUTED FRACTURE LEFT FEMORAL NECK. 2. NONOBSTRUCTING LEFT CALCULI. 3. THE REMAINDER THE CT SCAN THE ABDOMEN PELVIS OTHERWISE UNREMARKABLE      OTHER FINDINGS DETAILED ABOVE         All CT scans at this facility use dose modulation, iterative reconstruction, and/or weight based dosing when appropriate to reduce radiation dose to as low as reasonably achievable. CT LUMBAR SPINE WO CONTRAST   Final Result   NO ACUTE FRACTURES         All CT scans at this facility use dose modulation, iterative reconstruction, and/or weight based dosing when appropriate to reduce radiation dose to as low as reasonably achievable.           Assessment/Plan:    Active Hospital Problems    Diagnosis Date Noted    Closed fracture of left femur [facility]) [S72.92XA] 08/01/2019     61 yo male with history of CAD s/p cabg, CHF, HCV, CVA, polysubstance abuse, bipolar disorder, alcohol abuse who sustained femur fracture after fall from roof.     Left Femur fracutre  - s/p ORIF  - management per ortho     CAD s/p cabg  - cardiology following  - continue aspirin, metoprolol, atorvastatin     HTN - lisinopril,

## 2019-08-02 NOTE — H&P
[address]                      [facility], [address] [facility]                              HISTORY AND PHYSICAL    PATIENT NAME: Kymberly Phipps             :        1960  MED REC NO:   [id number]                            ROOM:       [id number]  ACCOUNT NO:   [de-identified]                           ADMIT DATE: 2019  PROVIDER:     Hoang Hernandez MD    HISTORY OF PRESENT ILLNESS:  A 69-year-old male, slip and fall, now with  femoral neck fracture displacement. The patient has a complicated past  medical history including history of mild CVA, hepatitis C, alcohol use,  coronary disease, substance abuse, hypertension, chronic back pain. He  is awake, alert, and oriented. Legs short and externally rotated. Did  not lose consciousness, otherwise, gives good exam.    REVIEW OF SYSTEMS:  Noncontributory. FAMILY HISTORY:  Noncontributory for Orthopedics. SOCIAL HISTORY:  Includes alcohol and drug use. PAST SURGICAL HISTORY:  Surgical history includes coronary artery bypass  in the past, wound debridement, hernia repair. PAST MEDICAL HISTORY:  As outlined includes alcohol use, anxiety,  coronary disease, congestive heart failure, back pain, depression,  history of heroin use, hepatitis C.    CURRENT MEDICATIONS:  Include albuterol, Lipitor, vitamin D, Plavix,  which is on hold, _____, Benadryl, _____, Toradol, Prinivil, Remeron,  Seroquel. ALLERGIES:  Listed as none. PHYSICAL EXAMINATION:  HEENT:  Normocephalic, atraumatic head. CARDIOVASCULAR:  Heart has regular rate and rhythm. LUNGS:  Clear. ABDOMEN:  Nontender and nondistended. EXTREMITIES:  Legs are short and externally rotated. Sites marked and identified now for his hemiarthroplasty of left hip. Rehabilitation program discussed.   The wear loosening, infection, blood  clot, DVT, loss of limb, leg, or life, delayed or prolonged recovery,  leg length change, implant longevity, and

## 2019-08-02 NOTE — CONSULTS
chest tightness, shortness of breath, wheezing and stridor. Cardiovascular: Negative for chest pain, palpitations and leg swelling. Gastrointestinal: Negative for abdominal distention, abdominal pain and nausea. Endocrine: Negative for cold intolerance and heat intolerance. Genitourinary: Negative for difficulty urinating. Musculoskeletal: Negative for arthralgias and joint swelling. Skin: Negative for color change and pallor. Allergic/Immunologic: Negative for immunocompromised state. Neurological: Negative for dizziness, seizures, syncope, facial asymmetry, speech difficulty, weakness, light-headedness, numbness and headaches. Hematological: Negative for adenopathy. Psychiatric/Behavioral: Negative for agitation, behavioral problems and confusion.        Allergies:  No Known Allergies    Current Medications:    Current Facility-Administered Medications:     hydrALAZINE (APRESOLINE) injection 10 mg, 10 mg, Intravenous, Q6H PRN, JASON Garzon - CNP    0.9 % sodium chloride infusion, , Intravenous, Continuous, JASON Garzon - CNP, Last Rate: 75 mL/hr at 08/02/19 0626    diazepam (VALIUM) tablet 5 mg, 5 mg, Oral, Q6H PRN, JASON Alvarado CNP    oxyCODONE-acetaminophen (PERCOCET) 5-325 MG per tablet 1 tablet, 1 tablet, Oral, Q4H PRN, JASON Alvarado CNP    ceFAZolin (ANCEF) 2 g in dextrose 5 % 100 mL IVPB, 2 g, Intravenous, On Call to OR, JASON Alvarado CNP    fentaNYL (SUBLIMAZE) injection 50 mcg, 50 mcg, Intravenous, Q10 Min PRN, Victor M Estevez MD    HYDROmorphone (DILAUDID) injection 0.5 mg, 0.5 mg, Intravenous, Q10 Min PRN, Victor M Estevez MD    HYDROcodone-acetaminophen (NORCO) 5-325 MG per tablet 1 tablet, 1 tablet, Oral, PRN **OR** HYDROcodone-acetaminophen (NORCO) 5-325 MG per tablet 2 tablet, 2 tablet, Oral, PRN, Victor M Estevez MD    diphenhydrAMINE (BENADRYL) injection 12.5 mg, 12.5 mg, Intravenous, Once PRN, Victor M Estevez MD    ondansetron [facility]) injection 4 mg, 4 mg, Intravenous, Once PRN, Byron Lieberman MD    metoclopramide (REGLAN) injection 10 mg, 10 mg, Intravenous, Once PRN, Byron Lieberman MD    meperidine (DEMEROL) injection 12.5 mg, 12.5 mg, Intravenous, Q5 Min PRN, Byron Lieberman MD    acetaminophen (TYLENOL) tablet 1,000 mg, 1,000 mg, Oral, Once, Alyssa Schulz    ondansetron [facility]) injection 4 mg, 4 mg, Intravenous, Q6H PRN, NORBERT Schulz    morphine (PF) injection 1 mg, 1 mg, Intravenous, Q4H PRN, Phillip Pradhan DO, 1 mg at 08/02/19 1006    Physical Examination:    /87   Pulse 77   Temp 98.2 °F (36.8 °C) (Temporal)   Resp 18   Ht 5' 10\" (1.778 m)   Wt 150 lb (68 kg)   SpO2 98%   BMI 21.52 kg/m²    Physical Exam   Constitutional: He is oriented to person, place, and time. He appears well-developed and well-nourished. No distress. HENT:   Head: Normocephalic and atraumatic. Eyes: Pupils are equal, round, and reactive to light. Conjunctivae are normal. Right eye exhibits no discharge. Neck: Normal range of motion. Neck supple. No JVD present. No tracheal deviation present. Cardiovascular: Normal rate, regular rhythm, normal heart sounds and intact distal pulses. Exam reveals no gallop and no friction rub. No murmur heard. Pulmonary/Chest: Effort normal and breath sounds normal. No respiratory distress. He has no wheezes. He has no rales. Abdominal: Soft. Bowel sounds are normal. He exhibits no distension. There is no tenderness. Musculoskeletal: Normal range of motion. He exhibits no edema. Neurological: He is alert and oriented to person, place, and time. No cranial nerve deficit. Skin: Skin is warm. No rash noted. He is not diaphoretic. Psychiatric: He has a normal mood and affect.        LABS:  CBC:   Lab Results   Component Value Date    WBC 7.8 08/02/2019    RBC 4.45 08/02/2019    RBC 4.55 05/23/2012    HGB 14.9 08/02/2019    HCT 41.8 08/02/2019    MCV 93.9 08/02/2019    MCH 33.3 08/02/2019    MCHC 35.5 08/02/2019    RDW 15.3 08/02/2019     08/02/2019    MPV 8.5 10/18/2015     CBC with Differential:   Lab Results   Component Value Date    WBC 7.8 08/02/2019    RBC 4.45 08/02/2019    RBC 4.55 05/23/2012    HGB 14.9 08/02/2019    HCT 41.8 08/02/2019     08/02/2019    MCV 93.9 08/02/2019    MCH 33.3 08/02/2019    MCHC 35.5 08/02/2019    RDW 15.3 08/02/2019    BANDSPCT 20 06/06/2017    LYMPHOPCT 10.4 08/01/2019    MONOPCT 7.8 08/01/2019    EOSPCT 4.0 02/15/2012    BASOPCT 0.4 08/01/2019    MONOSABS 0.7 08/01/2019    LYMPHSABS 0.9 08/01/2019    EOSABS 0.1 08/01/2019    BASOSABS 0.0 08/01/2019     CMP:    Lab Results   Component Value Date     08/02/2019    K 3.9 08/02/2019     08/02/2019    CO2 23 08/02/2019    BUN 15 08/02/2019    CREATININE 0.81 08/02/2019    GFRAA >60.0 08/02/2019    LABGLOM >60.0 08/02/2019    GLUCOSE 99 08/02/2019    GLUCOSE 109 05/23/2012    PROT 7.2 08/02/2019    LABALBU 3.5 08/02/2019    LABALBU 4.3 05/23/2012    CALCIUM 8.9 08/02/2019    BILITOT 0.9 08/02/2019    ALKPHOS 67 08/02/2019    AST 51 08/02/2019    ALT 63 08/02/2019     BMP:    Lab Results   Component Value Date     08/02/2019    K 3.9 08/02/2019     08/02/2019    CO2 23 08/02/2019    BUN 15 08/02/2019    LABALBU 3.5 08/02/2019    LABALBU 4.3 05/23/2012    CREATININE 0.81 08/02/2019    CALCIUM 8.9 08/02/2019    GFRAA >60.0 08/02/2019    LABGLOM >60.0 08/02/2019    GLUCOSE 99 08/02/2019    GLUCOSE 109 05/23/2012     Magnesium:    Lab Results   Component Value Date    MG 2.0 06/19/2017    MG 2.2 05/23/2012     Troponin:    Lab Results   Component Value Date    TROPONINI <0.010 04/01/2019       EKG: EKG: not uploaded yet for review    ASSESSMENT/PLAN:         Active Hospital Problems    Diagnosis Date Noted    [facility] [S72.92XA] 08/01/2019        Preprocedural cardiac evaluation: Patient presents for preoperative risk evaluation for non-cardiac surgery. The patient can sustain 4 METS activity without symptoms. This patient's cardiac risk is intermediate to high with a history of CAD, CABG, ICM and EF 40%. He is not decompensated and he has not unstable angina, CHF or arrhythmia. The surgery planned is low to intermediate cardiac risk. The patient is acceptable risk for surgery with no further evaluation. Electronically signed by Xavier Davey.  Mk Aj MD [facility] Director of Cardiology Services and CardiacCatheterization Laboratory  [facility]   on 8/2/2019 at 11:18 AM

## 2019-08-02 NOTE — CONSULTS
needed for abdominal pain 4/1/19   Garry Herrera PA-C   clorazepate (TRANXENE) 7.5 MG tablet TAKE 1 TABLET TWICE DAILY AS NEEDED 1/31/19   Historical Provider, MD   ketorolac (TORADOL) 10 MG tablet Take 1 tablet by mouth every 6 hours as needed for Pain 2/5/19 2/12/20  William Guaman MD   ibuprofen (ADVIL;MOTRIN) 800 MG tablet Take 1 tablet by mouth every 8 hours as needed for Pain 11/3/18   Karyle Lieu, APRN - CNP   OXcarbazepine (TRILEPTAL) 300 MG tablet Take 1 tablet by mouth 2 times daily 2/13/18   Guerita Quintero MD   Cholecalciferol (VITAMIN D3) 2000 UNITS CAPS Take 2,000 Units by mouth daily    Historical Provider, MD   clopidogrel (PLAVIX) 75 MG tablet Take 75 mg by mouth daily    Historical Provider, MD   albuterol (PROAIR HFA) 108 (90 BASE) MCG/ACT inhaler Inhale 2 puffs into the lungs every 6 hours as needed for Wheezing.  4/6/12   Kristin Castillo MD       Scheduled Meds:   acetaminophen  1,000 mg Oral Once    dexamethasone  8 mg Intravenous Once     Continuous Infusions:  PRN Meds:hydrALAZINE, ondansetron, morphine    No Known Allergies    Social History     Socioeconomic History    Marital status:      Spouse name: Not on file    Number of children: 1    Years of education: 15    Highest education level: Not on file   Occupational History    Not on file   Social Needs    Financial resource strain: Not on file    Food insecurity:     Worry: Not on file     Inability: Not on file    Transportation needs:     Medical: Not on file     Non-medical: Not on file   Tobacco Use    Smoking status: Current Every Day Smoker     Packs/day: 2.00     Years: 45.00     Pack years: 90.00     Types: Cigarettes    Smokeless tobacco: Current User   Substance and Sexual Activity    Alcohol use: No     Comment: social    Drug use: Yes     Frequency: 1.0 times per week     Types: Opiates , Cocaine, Other-see comments     Comment: heroin, crack, xanax    Sexual activity: Yes     Partners: extensive cardiac history, patient denies any numbness or tingling, patient has been off of plavix for a few weeks  Plan:  Management per orthopedic surgery  Pain control per Ortho  Preoperative clearance per cardiology    2. Hypertension  Assessment: Hypertensive since arrival, patient's drug use is likely contributing factor  Plan:  Monitor vital signs  Hold home medication given n.p.o. Status  IV hydralazine as needed    3.  HLD  Assessment: Take statin daily, extensive cardiac history  Plan:  Continue statin post-op  Cardiac diet postop    Electronically signed by JASON Tenorio CNP on 8/2/19 at 3:12 AM    Dr. Ivy Loaiza MD - supervising physician

## 2019-08-02 NOTE — BRIEF OP NOTE
Brief Postoperative Note  ______________________________________________________________    Patient: Albert Golden  YOB: 1960  MRN: [id number]  Date of Procedure: 8/2/2019    Pre-Op Diagnosis: INPATIENT    Post-Op Diagnosis: Same       Procedure(s):  LEFT HIP HEMIARTHROPLASTY    Anesthesia: Spinal    Surgeon(s):  Luis Okeefe MD    Assistant: leola rodriguez    Estimated Blood Loss (mL): 659     Complications: None    Specimens:   ID Type Source Tests Collected by Time Destination   A : FEMORAL HEAD Bone Joint, Hip SURGICAL PATHOLOGY Luis Okeefe MD 8/2/2019 1400        Implants:  Implant Name Type Inv. Item Serial No.  Lot No. LRB No. Used   IMPL HIP FEM HEAD VERSYS 12TO14 0MM - [id number] Hip IMPL HIP FEM HEAD VERSYS 12TO14 0MM [id number] Ofelia Better [id number] Left 1   IMPL HIP FEM STEM TRABECULAR [id number] - [id number] Hip IMPL HIP FEM STEM TRABECULAR [id number] Ofelia Better [id number] Left 1   IMPL HIP FEM SHLL MULTIPOLAR BIPLR 53MM - [id number] Hip IMPL HIP FEM SHLL MULTIPOLAR BIPLR 53MM [id number] Ofelia Better [id number] Left 1   IMPL HIP FEM LINER Andres [place] [id number] - [id number] Hip IMPL HIP FEM LINER BIPLR [id number] Ofelia Better [id number] Left 1   IMPL HIP CABLE W/CRIMP [id number] SS ST - [id number]. 01S Hip IMPL HIP CABLE W/CRIMP [id number] SS .801. 01S SYNTHES [id number] Left 1         Drains:   Urethral Catheter 16 fr (Active)   Catheter Indications Perioperative use in selected surgeries including but not limited to urologic, pelvic or need for intraoperative monitoring of urinary output due to prolonged surgery, large volume infusion or need for diuretic therapy in surgery 8/2/2019  9:30 AM   Site Assessment Urethral drainage 8/2/2019  9:30 AM   Urine Color Georgina 8/2/2019  9:30 AM   Urine Appearance Clear 8/2/2019  9:30 AM   Urine Odor Malodorous 8/2/2019  9:30 AM   Output (mL) 1000 mL 8/2/2019  6:47 AM       Findings: fem n fx left    Jose Moll

## 2019-08-02 NOTE — FLOWSHEET NOTE
1540- Patient received via bed from recovery room. Is sleepy responds to name quickly. Has no sensation to left lower leg, no M.O.E. Of toes. Aquacel dressing clwean, dry, intact to left hip. Cochran intact and draining clear yellow urine.

## 2019-08-02 NOTE — ANESTHESIA PRE PROCEDURE
2 times daily 2/13/18   Mariely Barreto MD   Cholecalciferol (VITAMIN D3) 2000 UNITS CAPS Take 2,000 Units by mouth daily    Historical Provider, MD   clopidogrel (PLAVIX) 75 MG tablet Take 75 mg by mouth daily    Historical Provider, MD   albuterol (PROAIR HFA) 108 (90 BASE) MCG/ACT inhaler Inhale 2 puffs into the lungs every 6 hours as needed for Wheezing.  4/6/12   Allie Colon MD       Current medications:    Current Facility-Administered Medications   Medication Dose Route Frequency Provider Last Rate Last Dose    hydrALAZINE (APRESOLINE) injection 10 mg  10 mg Intravenous Q6H PRN Noe Mike, APRN - CNP        0.9 % sodium chloride infusion   Intravenous Continuous Noe Mike, APRN - CNP 75 mL/hr at 08/02/19 9993      diazepam (VALIUM) tablet 5 mg  5 mg Oral Q6H PRN Jo Dumont, APRN - CNP        oxyCODONE-acetaminophen (PERCOCET) 5-325 MG per tablet 1 tablet  1 tablet Oral Q4H PRN Jo Dumont APRN - CNP        ceFAZolin (ANCEF) 2 g in dextrose 5 % 100 mL IVPB  2 g Intravenous On Call to [address] [place] [address] 909, APRN - CNP        acetaminophen (TYLENOL) tablet 1,000 mg  1,000 mg Oral Once Cite Alyssa Blank        ondansetron [facility]) injection 4 mg  4 mg Intravenous Q6H PRN Cite NORBERT Blank        morphine (PF) injection 1 mg  1 mg Intravenous Q4H PRN Kenroy Willingham Sedar DO   1 mg at 08/02/19 1006       Allergies:  No Known Allergies    Problem List:    Patient Active Problem List   Diagnosis Code    CVA (cerebral vascular accident) ([facility] I63.9    Affective psychosis, bipolar ([facility] F31.9    Hepatitis C virus infection B19.20    BP (high blood pressure) I10    Abnormality of gait and mobility with gait ataxia due to late effects of CVA, [facility] admit 06/08/17 R26.9    Alcohol abuse F10.10    Anxiety F41.9    Ataxia R27.0    CAD (coronary artery disease) I25.10    Chronic back pain M54.9, G89.29    Cocaine abuse ([facility] F14.10    Bipolar depression ([facility] F31.9    Heroin abuse

## 2019-08-02 NOTE — DISCHARGE INSTR - COC
Continuity of Care Form    Patient Name: Yunier Maynard   :  1960  MRN:  [id number]    Admit date:  2019  Discharge date:  2019    Code Status Order: Full Code   Advance Directives:   Advance Care Flowsheet Documentation     Date/Time Healthcare Directive Type of Healthcare Directive Copy in [address] Agent's Name Healthcare Agent's Phone Number    19 [phone]  No, patient does not have an advance directive for healthcare treatment -- -- -- -- --    19 [phone]  No, patient does not have an advance directive for healthcare treatment -- -- -- -- --          Admitting Physician:  Lis Storm MD  PCP: Gunjan West MD    Discharging Nurse: [facility] Unit/Room#: K978/P850-07  Discharging Unit Phone Number: ***    Emergency Contact:   Extended Emergency Contact Information  Primary Emergency Contact: [address] Phone: [phone]  Work Phone: [phone]  Mobile Phone: [phone]  Relation: Child  Secondary Emergency Contact: Rosie Pino  Address: [address] [place] [address] Phone: [phone]  Work Phone: [phone]  Mobile Phone: [phone]  Relation: Other    Past Surgical History:  Past Surgical History:   Procedure Laterality Date    CORONARY ARTERY BYPASS GRAFT      EXCISION / BIOPSY SKIN LESION OF ARM N/A 2017    I&D DEBRIDEMENT NECROTIC WOUND ABSCESS LEFT MEDIAL ELBOW AND RIGHT FOREARM  performed by Magdalena Kang MD at [address] 2019    RIGHT [address] performed by Muna Eid MD at [address] [facility] ECHOCARDIOGRAM  5/15/2013       Immunization History: There is no immunization history on file for this patient.     Active Problems:  Patient Active Problem List   Diagnosis Code    CVA (cerebral vascular accident) ([facility] I63.9    Affective psychosis, bipolar ([facility] F31.9    Hepatitis C virus infection B19.20    BP (high blood pressure) I10    Abnormality of gait and mobility with gait ataxia due to late effects of CVA, [facility] admit 06/08/17 R26.9    Alcohol abuse F10.10    Anxiety F41.9    Ataxia R27.0    CAD (coronary artery disease) I25.10    Chronic back pain M54.9, G89.29    Cocaine abuse ([facility] F14.10    Bipolar depression ([facility]) F31.9    Heroin abuse ([facility] [address] [facility] F11.10    History of hepatitis C Z86.19    Hyperlipidemia E78.5    Hypertension I10    Late effects of CVA (cerebrovascular accident) I69.90    S/P CABG x 3 Z95.1    Tobacco abuse Z72.0    Leukocytosis D72.829    FAINA (generalized anxiety disorder) F41.1    Conversion disorder F44.9    Dysphagia, oropharyngeal phase R13.12    Transient ischemic attack G45.9    Myocardial infarct, old I25.2    Hypokalemia E87.6    Polydrug abuse ([facility]) F19.10    Major depressive disorder, recurrent ([facility] [facility] F33.9    Right inguinal hernia K40.90    Bipolar 1 disorder, depressed ([facility]) M47.0    Umbilical hernia without obstruction and without gangrene K42.9    Closed fracture of left femur ([facility]) S72. 92XA       Isolation/Infection:   Isolation          No Isolation            Nurse Assessment:  Last Vital Signs: BP (!) 152/82   Pulse 76   Temp 97.7 °F (36.5 °C) (Oral)   Resp 18   Ht 5' 10\" (1.778 m)   Wt 150 lb (68 kg)   SpO2 98%   BMI 21.52 kg/m²     Last documented pain score (0-10 scale): Pain Level: 6  Last Weight:   Wt Readings from Last 1 Encounters:   08/01/19 150 lb (68 kg)     Mental Status:  oriented    IV Access:  - None    Nursing Mobility/ADLs:  Walking   Assisted  Transfer  Assisted  Bathing  Assisted  Dressing  Assisted  Toileting  Assisted  Feeding  Dependent  Med Admin  Dependent  Med Delivery   whole    Wound Care Documentation and Therapy:        Elimination:  Continence:   · Bowel: No  · Bladder: No  Urinary Catheter: None   Colostomy/Ileostomy/Ileal Conduit: No       Date of Last BM: 8/6/2019    Intake/Output Summary (Last 24 hours) at 8/2/2019 0708  Last data filed at 8/2/2019 0669  Gross per 24 hour   Intake --   Output 1000 ml   Net -1000 ml     I/O last 3 completed shifts:  In: -   Out: 1000 [Urine:1000]    Safety Concerns:     History of Falls (last 30 days)    Impairments/Disabilities:      None    Nutrition Therapy:  Current Nutrition Therapy:   - Oral Diet:  Cardiac    Routes of Feeding: Oral  Liquids: No Restrictions  Daily Fluid Restriction: no  Last Modified Barium Swallow with Video (Video Swallowing Test): not done    Treatments at the Time of Hospital Discharge:   Respiratory Treatments: ***  Oxygen Therapy:  is not on home oxygen therapy.   Ventilator:    - No ventilator support    Rehab Therapies: Physical Therapy and Occupational Therapy  Weight Bearing Status/Restrictions: No weight bearing restirctions  Other Medical Equipment (for information only, NOT a DME order):  walker  Other Treatments: ***    Patient's personal belongings (please select all that are sent with patient):  {[facility] DME Belongings:[id number]}    RN SIGNATURE:  Electronically signed by Compa Daniels RN on 8/6/19 at 6:10 PM    CASE MANAGEMENT/SOCIAL WORK SECTION    Inpatient Status Date: ***    Readmission Risk Assessment Score:  Readmission Risk              Risk of Unplanned Readmission:        14           Discharging to Facility/ Agency   · Name: [facility]   · Address:  · Phone: [phone]  · Fax:    ·     / signature Electronically signed by Jenn Corey RN on 8/6/19 at 3:39 PM      PHYSICIAN SECTION    Prognosis: {Prognosis:[id number]}    Condition at Discharge: [address] [place] [address] Patient Condition:[id number]}    Rehab Potential (if transferring to Rehab): {Prognosis:[id number]}    Recommended Labs or Other Treatments After Discharge: ***    Physician Certification: I certify the above information and transfer of Hillary Smith  is necessary for the continuing treatment of the diagnosis

## 2019-08-03 NOTE — OP NOTE
fascia over the gluteus bo. By internally rotating, we were able to open the posterior hip capsule. We removed the short external rotators and we preserved all of the hip  capsule structures. We opened it in a T like fashion. This allowed for  exposure of the hemarthrosis and fracture site which was widely  displaced. We removed the fractured femoral head. The acetabulum was  pristine and it measured 53 mm. It was packed with a dry sponge. We then prepared the femoral side first with the box osteotome conically  reaming and then inserting it up to in a sequential fashion of 14  trabecular metal porous broach. We trialed of this and had symmetric  leg heights with good stable reduction. We then removed the 14 broach  and placed a 14 trabecular metal stem. There was some prior chipping  _____ at the calcar from the fracture, compression wire 1.7-mm cable  from [employer] was placed around there temporarily and then it was further  tightened and the crimped in position after it was compressing the final  inserted stem. We trialed different heads and elected for a 28 inner 53  outer bipolar construct. It was assembled and reduced, and the hip was  extremely stable in all planes, checked even in the 90-90 position. In order to enhance the stability, we repaired the posterior hip capsule  in a side-to-side fashion with figure-of-eight #5 Ti-Cron's followed by  repair of the gluteal insertion into the back of the femur. Repeated lavage irrigation throughout. We closed the iliotibial band  with figure-of-eight style with #1 Vicryl, inverted interrupted 2-0s,  skin staples, bulky dressing for compression, and abduction orthosis. The patient was taken to the recovery room and x-rays were pending.         Danny Aguilar MD    D: 08/02/2019 14:15:46       T: 08/03/2019 1:30:44     [id number]  Job#: [id number]     Doc#: [id number]    CC:

## 2019-08-03 NOTE — CONSULTS
the SI joints right greater than left. NO ACUTE FRACTURES All CT scans at this facility use dose modulation, iterative reconstruction, and/or weight based dosing when appropriate to reduce radiation dose to as low as reasonably achievable. Ct Abdomen Pelvis W Iv Contrast Additional Contrast? None    Result Date: 8/2/2019  Examination: CT ABDOMEN PELVIS W IV CONTRAST Indication:  Generalized pain after fall Technique: Multiple serial axial images was performed through the abdomen and pelvis utilizing 100cc of Optiray 370. Images were reconstructed in the axial and coronal and sagittal planes. Comparison:  No comparison is available. Findings: The visualized basal lungs show bibasilar areas of dependent atelectasis. The liver, , spleen, pancreas, adrenals, are unremarkable. The gallbladder surgically absent. The right kidney is unremarkable. There is less than a 2 mm nonobstructing calculi superior pole left kidney. Large and small bowel show no sign of obstruction. There is stool scattered throughout the large bowel. The appendix is visualized. No periappendiceal stranding. No diverticulitis. The bladder is distended. Correlate clinically. Consider decompression. No free air. No free fluid. The visualized abdominal aorta is of normal size and caliber. No significant retroperitoneal adenopathy. Within the field-of-view there is a comminuted fracture left femoral neck. 1. COMMINUTED FRACTURE LEFT FEMORAL NECK. 2. NONOBSTRUCTING LEFT CALCULI. 3. THE REMAINDER THE CT SCAN THE ABDOMEN PELVIS OTHERWISE UNREMARKABLE OTHER FINDINGS DETAILED ABOVE All CT scans at this facility use dose modulation, iterative reconstruction, and/or weight based dosing when appropriate to reduce radiation dose to as low as reasonably achievable. EKG:      Assessment:    Active Hospital Problems    Diagnosis Date Noted    Closed fracture of left femur ([address] [S72.92XA] 08/01/2019          Plan:  1. Routine post op  2.  OK

## 2019-08-03 NOTE — PROGRESS NOTES
[facility] OCCUPATIONAL THERAPY EVALUATION - ACUTE     Date: 8/3/2019  Patient Name: Garvin Libman        MRN: [id number]  Account: [de-identified]   : 1960  (60 y.o.)  [facility]    Chart Review:  Diagnosis:  The primary encounter diagnosis was Closed fracture of neck of left femur, initial encounter ([facility] Diagnoses of Fall from roof, initial encounter and Polysubstance abuse [facility]) were also pertinent to this visit. Past Medical History:   Diagnosis Date    Abnormality of gait and mobility     Alcohol abuse     Anxiety     Ataxia     Bipolar affective (HCC)     with psychosis    BPH (benign prostatic hyperplasia)     CAD (coronary artery disease)     CHF (congestive heart failure) (HCC)     Chronic back pain     Cocaine abuse ([facility]     Depression     Heroin abuse ([facility] 2014    Hiatal hernia     History of hepatitis C     Hyperlipidemia     Hypertension     Late effects of CVA (cerebrovascular accident)     Myocardial infarct, old     S/P CABG x 3     Tobacco abuse 2014    Transient ischemic attack      Past Surgical History:   Procedure Laterality Date    CORONARY ARTERY BYPASS GRAFT      EXCISION / BIOPSY SKIN LESION OF ARM N/A 2017    I&D DEBRIDEMENT NECROTIC WOUND ABSCESS LEFT MEDIAL ELBOW AND RIGHT FOREARM  performed by Honorio Tam MD at [facility] N/A 2019    RIGHT INGUINAL HERNIA REPAIR performed by Ranjit Michael MD at [address] [place] [address]  5/15/2013       Restrictions  Restrictions/Precautions: Fall Risk, Weight Bearing, General Precautions, Up as Tolerated  Lower Extremity Weight Bearing Restrictions  Left Lower Extremity Weight Bearing: Weight Bearing As Tolerated  Position Activity Restriction  Hip Precautions: Posterior hip precautions     Safety Devices: Safety Devices  Safety Devices in place: Yes  Type of devices:  All fall risk precautions in place    Subjective  Pre Treatment Pain Screening  Pain at present: 10  Scale Used: Numeric Score  Intervention List: Patient able to continue with treatment  Comments / Details: pt appears 7-8/10 on visual scale, stated ok for OT eval     Pain Reassessment:   Pain Assessment  Patient Currently in Pain: Yes  Pain Assessment: 0-10  Pain Type: Surgical pain  Pain Location: Hip  Pain Orientation: Left  Patient's Stated Pain Goal: 10       Prior Level of Function:  Social/Functional History  Lives With: Alone  Type of Home: House  Home Layout: One level  Home Access: Stairs to enter with rails  Entrance Stairs - Number of Steps: 3  Entrance Stairs - Rails: Both  Bathroom Shower/Tub: Tub/Shower unit  Receives Help From: Fabi.)  ADL Assistance: Independent  Homemaking Assistance: Independent  Homemaking Responsibilities: Yes  Ambulation Assistance: Independent  Transfer Assistance: Independent  Active : No    OBJECTIVE:     Orientation Status:  Orientation  Overall Orientation Status: Within Functional Limits    Observation:  Observation/Palpation  Observation: pt in bed eating, no signs of acute distress    Cognition Status:  Cognition  Cognition Comment: Pt reluctant to have assitance d/t pain     Perception Status:  Perception  Overall Perceptual Status: WFL    Sensation Status:  Sensation  Overall Sensation Status: WFL    Vision and Hearing Status:  Vision  Vision: Impaired  Vision Exceptions: Wears glasses at all times  Hearing  Hearing: Within functional limits     ROM:   LUE AROM (degrees)  LUE AROM : WFL  Left Hand AROM (degrees)  Left Hand AROM: WFL  RUE AROM (degrees)  RUE AROM : WFL  Right Hand AROM (degrees)  Right Hand AROM: WFL    Strength:  LUE Strength  L Hand General: 4/5  LUE Strength Comment: 4/5  RUE Strength  R Hand General: 4/5  RUE Strength Comment: 4/5    Coordination, Tone, Quality of Movement:    Tone RUE  RUE Tone: Normotonic  Tone LUE  LUE Tone: Normotonic  Coordination  Movements Are Fluid And Coordinated: No  Coordination and Movement

## 2019-08-03 NOTE — PROGRESS NOTES
Hospitalist Progress Note      PCP: Jessika Donahue MD    Date of Admission: 8/1/2019    Subjective: :Fall from roof  POD#1 sp L hip arthroplasty. Patient seen and examined. No acute distress. Patient states he is eager to start working with PT. Some mild pain but overall believes pain is reasonably controlled. Medications:  Reviewed    Infusion Medications    sodium chloride 75 mL/hr at 08/03/19 0951     Scheduled Medications    sodium chloride flush  10 mL Intravenous 2 times per day    acetaminophen  650 mg Oral Q6H    lidocaine  2 patch Topical Q12H    sennosides-docusate sodium   Oral BID    enoxaparin  40 mg Subcutaneous Daily    aspirin  81 mg Oral Daily    atorvastatin  80 mg Oral Nightly    hydrOXYzine  50 mg Oral BID    lisinopril  5 mg Oral Daily    metoprolol tartrate  12.5 mg Oral BID    mirtazapine  7.5 mg Oral Nightly    QUEtiapine  600 mg Oral Nightly    tamsulosin  0.4 mg Oral Daily     PRN Meds: hydrALAZINE, diazepam, oxyCODONE-acetaminophen, sodium chloride flush, ondansetron, famotidine      Intake/Output Summary (Last 24 hours) at 8/3/2019 1600  Last data filed at 8/3/2019 1504  Gross per 24 hour   Intake 1320 ml   Output 1400 ml   Net -80 ml       Exam:    BP (!) 145/72   Pulse 70   Temp 97.2 °F (36.2 °C) (Oral)   Resp 16   Ht 5' 10\" (1.778 m)   Wt 149 lb 4 oz (67.7 kg)   SpO2 100%   BMI 21.42 kg/m²     General appearance: No apparent distress, sitting up in bed  HEENT:  Conjunctivae/corneas clear. Neck: Supple, with full range of motion. Respiratory:  Normal respiratory effort. Clear to auscultation, bilaterally without Rales/Wheezes/Rhonchi. Cardiovascular: Regular rate and rhythm with normal S1/S2 without murmurs, rubs or gallops. Abdomen: Soft, non-tender, non-distended with normal bowel sounds.   Musculoskeletal: left hip with c/d/i dressing  Neuro: moving all extremities    Labs:   Recent Labs     08/01/19  1915 08/02/19  0553 08/03/19  0525   WBC 8.8 7.8

## 2019-08-04 NOTE — PROGRESS NOTES
Result   NO ACUTE FRACTURES         All CT scans at this facility use dose modulation, iterative reconstruction, and/or weight based dosing when appropriate to reduce radiation dose to as low as reasonably achievable. Assessment/Plan:    Active Hospital Problems    Diagnosis Date Noted    Closed fracture of left femur [facility]) [S72.92XA] 08/01/2019     59 yo male with history of CAD s/p cabg, CHF, HCV, CVA, polysubstance abuse, bipolar disorder, alcohol abuse who sustained femur fracture after fall from roof.     # Left Femur fracture  - s/p ORIF  - management per ortho     # CAD s/p cabg  - cardiology following  - continue aspirin, metoprolol, atorvastatin     # HTN   - lisinopril, metoprolol     # Bipolar disorder - quetiapine, mirtazapine    Disposition: Patient sp L hip arthroplasty. Management per ortho. Continuing to follow.  PT/OT recs    Diet: DIET CARDIAC;    Code Status: Full Code    PT/OT Eval           Electronically signed by Hollie Escobar DO on 8/4/2019 at 2:30 PM

## 2019-08-04 NOTE — PROGRESS NOTES
Arc Quad: x10; AAROM on Lt                   Post-Pain  Pain rating (0-10 scale):7/10  Location and pain description same as pre-treatment unless indicated. Action: [] N/A  [] Nursing notified  [x] Pt declined intervention    Assessment   Body structures, Functions, Activity limitations: Decreased functional mobility ; Decreased endurance;Decreased ROM; Decreased balance; Increased Pain;Decreased high-level IADLs;Decreased ADL status; Decreased strength;Decreased safe awareness  Assessment: Pt with improved bed mobility this date, VCs for sequencing and to maintain hip precautions. Able to scoot towards head of bed with decreased WBing through Lt LE observed. Will have PT assess for transfers/gait when able.    Prognosis: Good  REQUIRES PT FOLLOW UP: Yes     Discharge Recommendations:  Continue to assess pending progress    Goals  Long term goals  Long term goal 1: indep with HEP to improve LE ROM and strength  Long term goal 2: pt will recall and maintain 3/3 posterior hip precautions with Min VC  Long term goal 3: bed mobility with SBA  Long term goal 4: to be assessed for transfers and gait when appropriate    Plan    Plan  Times per week: 7 days/wk,  QB  Times per day: Daily  Current Treatment Recommendations: Strengthening, Functional Mobility Training, ROM, Transfer Training, Gait Training, Neuromuscular Re-education, Manual Therapy - Joint Manipulation, Safety Education & Training, Home Exercise Program, Equipment Evaluation, Education, & procurement, Modalities, Patient/Caregiver Education & Training, Pain Management, Balance Training, Stair training  Safety Devices  Type of devices: Bed alarm in place, All fall risk precautions in place, Left in bed, Nurse notified     [facility] (6 CLICK) [address] Mobility Raw Score : 7     Therapy Time   Individual   Time In 0848   Time Out 0913   Minutes 25     Timed Code Treatment Minutes: 25 Minutes   Trans/bed mob: 15  There ex: [address], PTA, 08/04/19 at 9:20 AM    Electronically signed by Christopher Werner PTA on 8/4/2019 at 9:20 AM

## 2019-08-05 NOTE — PROGRESS NOTES
Assessment completed at this time. Pt is a&ox4. Dressing to the left his is clean/dry/intact. Pulses palpable. Bowel sounds active. Lungs are clear. Pt has pain and anxiety. Medicated with percocet and valium at this time. Pt given snack and was bathed by the PCA. Call light in reach.

## 2019-08-05 NOTE — PROGRESS NOTES
failure) [facility]     Chronic back pain     [facility]     Depression     [facility] ([facility] 2014    Hiatal hernia     History of hepatitis C     Hyperlipidemia     Hypertension     Late effects of CVA (cerebrovascular accident)     Myocardial infarct, old 2010    S/P CABG x 3     Tobacco abuse 2014    Transient ischemic attack      Past Surgical History:   Procedure Laterality Date    CORONARY ARTERY BYPASS GRAFT      EXCISION / BIOPSY SKIN LESION OF ARM N/A 4/8/2017    I&D DEBRIDEMENT NECROTIC WOUND ABSCESS LEFT MEDIAL ELBOW AND RIGHT FOREARM  performed by Tom Benavides MD at [facility] N/A 2/5/2019    RIGHT INGUINAL HERNIA REPAIR performed by Cas Sosa MD at [address] [place] [address]  5/15/2013         Restrictions:  Restrictions/Precautions: Fall Risk, Weight Bearing, General Precautions, Up as Tolerated  Lower Extremity Weight Bearing Restrictions  Left Lower Extremity Weight Bearing: Weight Bearing As Tolerated  Position Activity Restriction  Hip Precautions: Posterior hip precautions    SUBJECTIVE:  General  Chart Reviewed: Yes  Family / Caregiver Present: No  Subjective  Subjective: id like to get up and walk    Pre Pain Assessment:     Pain Screening  Patient Currently in Pain: Yes  Pre Treatment Pain Screening  Pain at present: 7  Scale Used: Numeric Score  Intervention List: Patient able to continue with treatment    Post Pain Assessment:   Pain Assessment  Pain Assessment: 0-10  Pain Level: 7       OBJECTIVE:         Bed mobility  Supine to Sit: Moderate assistance(Assist LLE off bed)  Sit to Supine: (NT pt up in chair)  Scooting: Minimal assistance    Transfers  Sit to Stand: Stand by assistance  Stand to sit: Stand by assistance  Bed to Chair: Stand by assistance  Comment: VC for proper hand placement. good knowledge of posterior hip precautions, slow movements, painful movements , increased time and effort     Ambulation  Ambulation?: Yes  More

## 2019-08-06 PROBLEM — R26.89 IMPAIRED GAIT AND MOBILITY: Status: ACTIVE | Noted: 2019-01-01

## 2019-08-06 PROBLEM — N40.0 BPH (BENIGN PROSTATIC HYPERPLASIA): Status: ACTIVE | Noted: 2019-01-01

## 2019-08-06 PROBLEM — I50.9 CHF (CONGESTIVE HEART FAILURE) (HCC): Status: ACTIVE | Noted: 2019-01-01

## 2019-08-06 NOTE — CONSULTS
reasonably achievable. Ct Abdomen Pelvis 8/2/2019  Examination: CT ABDOMEN PELVIS W IV CONTRAST Indication:  Generalized pain after fall Technique: Multiple serial axial images was performed through the abdomen and pelvis utilizing 100cc of Optiray 370. Images were reconstructed in the axial and coronal and sagittal planes. Comparison:  No comparison is available. Findings: The visualized basal lungs show bibasilar areas of dependent atelectasis. The liver, , spleen, pancreas, adrenals, are unremarkable. The gallbladder surgically absent. The right kidney is unremarkable. There is less than a 2 mm nonobstructing calculi superior pole left kidney. Large and small bowel show no sign of obstruction. There is stool scattered throughout the large bowel. The appendix is visualized. No periappendiceal stranding. No diverticulitis. The bladder is distended. Correlate clinically. Consider decompression. No free air. No free fluid. The visualized abdominal aorta is of normal size and caliber. No significant retroperitoneal adenopathy. Within the field-of-view there is a comminuted fracture left femoral neck. 1. COMMINUTED FRACTURE LEFT FEMORAL NECK. 2. NONOBSTRUCTING LEFT CALCULI.  3. THE REMAINDER THE CT SCAN THE ABDOMEN PELVIS OTHERWISE UNREMARKABLE OTHER FINDINGS DETAILED ABOVE      Labs:     labs reviewed and discussed with patient and staff    Lab Results   Component Value Date    POCGLU 105 06/26/2017    POCGLU 103 06/25/2017    POCGLU 93 06/24/2017    POCGLU 95 06/19/2017    POCGLU 124 04/08/2017     Lab Results   Component Value Date     08/06/2019    K 4.0 08/06/2019     08/06/2019    CO2 20 08/06/2019    BUN 18 08/06/2019    CREATININE 0.88 08/06/2019    CALCIUM 8.4 08/06/2019    LABALBU 3.5 08/02/2019    LABALBU 4.3 05/23/2012    BILITOT 0.9 08/02/2019    ALKPHOS 67 08/02/2019    AST 51 08/02/2019    ALT 63 08/02/2019     Lab Results   Component Value Date    WBC 6.0 08/06/2019    RBC 3.60 assistance (08/03/19 1406)  LE Bathing: Dependent/Total (08/03/19 1406)  UE Dressing: Minimal assistance (08/03/19 1406)  LE Dressing: Dependent/Total (08/03/19 1406)  Toileting: Dependent/Total (08/03/19 1406)  Additional Comments: ADL's simulated bedside, +2 for safety  (08/03/19 1406)    LTG:                 Speech therapy: FIMS:          Past Medical History:          Diagnosis Date    Abnormality of gait and mobility     Alcohol abuse     Anxiety     Ataxia     Bipolar affective (HCC)     with psychosis    BPH (benign prostatic hyperplasia)     CAD (coronary artery disease)     CHF (congestive heart failure) (HCC)     Chronic back pain     [facility]     Depression     Heroin abuse ([facility] 2014    Hiatal hernia     History of hepatitis C     Hyperlipidemia     Hypertension     Late effects of CVA (cerebrovascular accident)     Myocardial infarct, old 2010    S/P CABG x 3     Tobacco abuse 2014    Transient ischemic attack          PastSurgical History:          Procedure Laterality Date    CORONARY ARTERY BYPASS GRAFT      EXCISION / BIOPSY SKIN LESION OF ARM N/A 4/8/2017    I&D DEBRIDEMENT NECROTIC WOUND ABSCESS LEFT MEDIAL ELBOW AND RIGHT FOREARM  performed by Mulugeta May MD at [facility] N/A 2/5/2019    RIGHT INGUINAL HERNIA REPAIR performed by Ro Perea MD at [address] [place] [address]  5/15/2013         Allergies:   No Known Allergies     CurrentMedications:     Current Facility-Administered Medications: hydrALAZINE (APRESOLINE) injection 10 mg, 10 mg, Intravenous, Q6H PRN  0.9 % sodium chloride infusion, , Intravenous, Continuous  diazepam (VALIUM) tablet 5 mg, 5 mg, Oral, Q6H PRN  oxyCODONE-acetaminophen (PERCOCET) 5-325 MG per tablet 1 tablet, 1 tablet, Oral, Q4H PRN  sodium chloride flush 0.9 % injection 10 mL, 10 mL, Intravenous, 2 times per day  sodium chloride flush 0.9 % injection 10 mL, 10 mL, Intravenous, PRN  acetaminophen Neurological: Positive for weakness. Negative for dizziness, tremors, seizures and headaches. Hematological: Does not bruise/bleed easily. Psychiatric/Behavioral: Positive for decreased concentration. Negative for hallucinations and suicidal ideas. The patient is not nervous/anxious. Physical Exam:    BP (!) 148/83   Pulse 81   Temp 97.7 °F (36.5 °C) (Oral)   Resp 18   Ht 5' 10\" (1.778 m)   Wt 149 lb 4 oz (67.7 kg)   SpO2 100%   BMI 21.42 kg/m²      Physical Exam   Constitutional: He appears well-developed and well-nourished. He appears listless. Non-toxic appearance. He does not have a sickly appearance. He appears ill. No distress. HENT:   Head: Normocephalic. Not macrocephalic and not microcephalic. Head is without raccoon's eyes and without Gomez's sign. Mouth/Throat: Oropharyngeal exudate present. Eyes: Pupils are equal, round, and reactive to light. Conjunctivae and EOM are normal. Right eye exhibits no discharge. Left eye exhibits no discharge. No scleral icterus. Neck: Normal range of motion. Normal carotid pulses, no hepatojugular reflux and no JVD present. Spinous process tenderness and muscular tenderness present. Carotid bruit is not present. No tracheal deviation present. No thyromegaly present. Cardiovascular: Exam reveals distant heart sounds. Pulmonary/Chest: Effort normal. No stridor. He has decreased breath sounds. Abdominal: Soft. He exhibits no distension. Bowel sounds are decreased. There is no tenderness. There is no rebound and no guarding. Musculoskeletal:        Left hip: He exhibits decreased range of motion, decreased strength and tenderness. Cervical back: He exhibits decreased range of motion and tenderness. Thoracic back: He exhibits decreased range of motion and tenderness. Lumbar back: He exhibits decreased range of motion and tenderness.    Lymphadenopathy:        Head (right side): No submental and no submandibular

## 2019-08-06 NOTE — PROGRESS NOTES
Hip surgery    Progress Note    Subjective:     Post-Operative Day: 4 Status Post left Hip sebastian Arthroplasty  Systemic or Specific Complaints:Pain Control    Objective:     CURRENT VITALS:  BP (!) 148/83   Pulse 81   Temp 97.7 °F (36.5 °C) (Oral)   Resp 18   Ht 5' 10\" (1.778 m)   Wt 149 lb 4 oz (67.7 kg)   SpO2 100%   BMI 21.42 kg/m²     General: alert, appears stated age and cooperative   Wound: Wound clean and dry no evidence of infection. Motion: Painful range of Motion   DVT Exam: No evidence of DVT seen on physical exam.       NVI in lower extremity. Thigh swollen but soft. Moving foot and ankle. Data Review  Recent Labs     08/04/19  0553 08/05/19  0523 08/06/19  0525   WBC 8.6 6.0 6.0   RBC 3.69* 3.80* 3.60*   HGB 12.2* 12.6* 12.1*   HCT 34.9* 35.5* 34.9*   MCV 94.6 93.3 96.9   MCH 33.0* 33.2* 33.6*   MCHC 34.9 35.5 34.7   RDW 15.4* 15.5* 15.9*    207 183       Assessment:     Status Post left Hip sebastian Arthroplasty. Doing well postoperatively. Awaiting rehab placmenet. Continue monitoring- pt does have polysubstance abuse, and cardiac hx.  He does require PT/ OT interventions to safely return home    Plan:      1: Discharge today, Return to Clinic:  :  2:  Continue Deep venous thrombosis prophylaxis  3:  Continue physical therapy  4:  Continue Pain Control

## 2019-08-06 NOTE — PROGRESS NOTES
Physical Therapy Med Surg Daily Treatment Note  Facility[facility]Department[facility]  [facility]       NAME: Ginger Joe  : 1960 (60 y.o.)  MRN: [id number]  CODE STATUS: Full Code    Date of Service: 2019    Patient Diagnosis(es): Closed fracture of left femur [facility]) [S72.92XA]   Chief Complaint   Patient presents with    Fall     fell off roof around 6pm. positive loc.       Patient Active Problem List    Diagnosis Date Noted    Closed fracture of left femur ([facility]     Umbilical hernia without obstruction and without gangrene 2019    Bipolar 1 disorder, depressed ([facility] 2018    Right inguinal hernia 2018    Major depressive disorder, recurrent ([facility] 2018    Polydrug abuse ([facility] 10/23/2017    Hypokalemia 10/22/2017    Transient ischemic attack     Dysphagia, oropharyngeal phase 2017    FAINA (generalized anxiety disorder)     Conversion disorder     Affective psychosis, bipolar ([facility] 2017    Hepatitis C virus infection 2017    BP (high blood pressure) 2017    Abnormality of gait and mobility with gait ataxia due to late effects of CVA, [facility] admit 17     Anxiety     Ataxia     CAD (coronary artery disease)     Chronic back pain     Bipolar depression ([facility]     History of hepatitis C     Hyperlipidemia     Hypertension     Late effects of CVA (cerebrovascular accident)     S/P CABG x 3     Leukocytosis     CVA (cerebral vascular accident) ([facility] 2017    Alcohol abuse 2014    Cocaine abuse ([facility] 2014    Heroin abuse ([facility] 2014    Tobacco abuse 2014    Myocardial infarct, old 2010        Past Medical History:   Diagnosis Date    Abnormality of gait and mobility     Alcohol abuse     Anxiety     Ataxia     Bipolar affective (HCC)     with psychosis    BPH (benign prostatic hyperplasia)     CAD (coronary artery disease)     CH[facility]est[facility] failure) ([facility]     Chronic back pain     [facility]     Depression     Heroin abuse ([facility] 2014    Hiatal hernia     History of hepatitis C     Hyperlipidemia     Hypertension     Late effects of CVA (cerebrovascular accident)     Myocardial infarct, old 2010    S/P CABG x 3     Tobacco abuse 2014    Transient ischemic attack      Past Surgical History:   Procedure Laterality Date    CORONARY ARTERY BYPASS GRAFT      EXCISION / BIOPSY SKIN LESION OF ARM N/A 4/8/2017    I&D DEBRIDEMENT NECROTIC WOUND ABSCESS LEFT MEDIAL ELBOW AND RIGHT FOREARM  performed by Janene Givens MD at [facility] N N/A 2/5/2019    RIGHT INGUINAL HERNIA REPAIR performed by Supriya Cheema MD at [facility] ECHOCARDIOGRAM  5/15/2013          Restrictions:  Restrictions/Precautions: Fall Risk, Weight Bearing, General Precautions, Up as Tolerated  Lower Extremity Weight Bearing Restrictions  Left Lower Extremity Weight Bearing: Weight Bearing As Tolerated  Position Activity Restriction  Hip Precautions: Posterior hip precautions    SUBJECTIVE:  General  Chart Reviewed: Yes  Family / Caregiver Present: No  Subjective  Subjective: id like to get up and walk    Pre Pain Assessment:  Pre Treatment Pain Screening  Pain at present: 7  Scale Used: Numeric Score  Intervention List: Patient able to continue with treatment  Pain Screening  Patient Currently in Pain: Yes       Post Pain Assessment:   Pain Assessment  Pain Assessment: 0-10  Pain Level: 7  Pain Type: Surgical pain;Acute pain  Pain Location: Hip  Pain Orientation: Left       OBJECTIVE:         Bed mobility  Supine to Sit: Moderate assistance  Sit to Supine: (NT pt into chair. )  Comment: pt maintains hip precautions but needs mod A to bring LLE to EOB. Transfers  Sit to Stand:  Moderate Assistance(pt needing assist for anterior weight shift and assist for stability while pt completes stand. )  Stand to sit: Stand by assistance  Bed to Exercise Program, Equipment Evaluation, Education, & procurement, Modalities, Patient/Caregiver Education & Training, Pain Management, Balance Training, Stair training  Safety Devices  Type of devices:  All fall risk precautions in place, Call light within reach, Chair alarm in place, Left in chair     [facility] (6 CLICK) Srav[address] Raw Score : 15      Therapy Time   Individual   Time In 1006   Time Out 1029   Minutes 23      BM/Trsf: [address] PTA, 08/06/19 at 10:35 AM

## 2019-08-06 NOTE — PROGRESS NOTES
Progress Note  Patient: Valeria Villanueva  Unit/Bed: F636/P770-24  YOB: 1960  MRN: [id number]  Acct: [de-identified]   Admitting Diagnosis: Closed fracture of left femur [facility]) Han Malone  Admit Date:  8/1/2019  Hospital Day: 5    Chief Complaint: fall and fracture     Subjective/HPI: Patient with known CAD and CM, presents after fall and fracture. Cleared for surgery and underwent successfully without event. Doing well today no chest pain or shortness of breath. EKG:  Review of Systems:   Review of Systems      Physical Examination:    BP (!) 148/83   Pulse 81   Temp 97.7 °F (36.5 °C) (Oral)   Resp 18   Ht 5' 10\" (1.778 m)   Wt 149 lb 4 oz (67.7 kg)   SpO2 100%   BMI 21.42 kg/m²    Physical Exam   Constitutional: He appears healthy. No distress. HENT:   Mouth/Throat: Oropharynx is clear. Eyes: Pupils are equal, round, and reactive to light. Neck: Normal range of motion. No JVD present. Cardiovascular: Regular rhythm, S1 normal, S2 normal, normal heart sounds and intact distal pulses. Exam reveals no gallop. No murmur heard. Pulses:       Radial pulses are 2+ on the right side. Dorsalis pedis pulses are 2+ on the right side. Pulmonary/Chest: Effort normal and breath sounds normal. He has no wheezes. He has no rales. He exhibits no tenderness. Abdominal: Soft. Bowel sounds are normal.   Musculoskeletal: Normal range of motion. He exhibits no edema. Neurological: He is alert and oriented to person, place, and time. He has intact cranial nerves. Skin: Skin is warm and dry. No rash noted.        LABS:  CBC:   Lab Results   Component Value Date    WBC 6.0 08/06/2019    RBC 3.60 08/06/2019    RBC 4.55 05/23/2012    HGB 12.1 08/06/2019    HCT 34.9 08/06/2019    MCV 96.9 08/06/2019    MCH 33.6 08/06/2019    MCHC 34.7 08/06/2019    RDW 15.9 08/06/2019     08/06/2019    MPV 8.5 10/18/2015     CBC with Differential:    Lab Results   Component Value Date    WBC 6.0 08/06/2019    RBC 3.60 08/06/2019    RBC 4.55 05/23/2012    HGB 12.1 08/06/2019    HCT 34.9 08/06/2019     08/06/2019    MCV 96.9 08/06/2019    MCH 33.6 08/06/2019    MCHC 34.7 08/06/2019    RDW 15.9 08/06/2019    BANDSPCT 20 06/06/2017    LYMPHOPCT 10.4 08/01/2019    MONOPCT 7.8 08/01/2019    EOSPCT 4.0 02/15/2012    BASOPCT 0.4 08/01/2019    MONOSABS 0.7 08/01/2019    LYMPHSABS 0.9 08/01/2019    EOSABS 0.1 08/01/2019    BASOSABS 0.0 08/01/2019     CMP:    Lab Results   Component Value Date     08/06/2019    K 4.0 08/06/2019     08/06/2019    CO2 20 08/06/2019    BUN 18 08/06/2019    CREATININE 0.88 08/06/2019    GFRAA >60.0 08/06/2019    LABGLOM >60.0 08/06/2019    GLUCOSE 105 08/06/2019    GLUCOSE 109 05/23/2012    PROT 7.2 08/02/2019    LABALBU 3.5 08/02/2019    LABALBU 4.3 05/23/2012    CALCIUM 8.4 08/06/2019    BILITOT 0.9 08/02/2019    ALKPHOS 67 08/02/2019    AST 51 08/02/2019    ALT 63 08/02/2019     BMP:    Lab Results   Component Value Date     08/06/2019    K 4.0 08/06/2019     08/06/2019    CO2 20 08/06/2019    BUN 18 08/06/2019    LABALBU 3.5 08/02/2019    LABALBU 4.3 05/23/2012    CREATININE 0.88 08/06/2019    CALCIUM 8.4 08/06/2019    GFRAA >60.0 08/06/2019    LABGLOM >60.0 08/06/2019    GLUCOSE 105 08/06/2019    GLUCOSE 109 05/23/2012     Magnesium:    Lab Results   Component Value Date    MG 2.0 06/19/2017    MG 2.2 05/23/2012     Troponin:    Lab Results   Component Value Date    TROPONINI <0.010 04/01/2019         Assessment/Plan:    Active Hospital Problems    Diagnosis Date Noted    Closed fracture of left femur ([facility] [S72.92XA] 08/01/2019    Polydrug abuse ([facility] [F19.10] 10/23/2017    Dysphagia, oropharyngeal phase [R13.12] 06/13/2017    FAINA (generalized anxiety disorder) [F41.1]     Hypertension [I10]     Hyperlipidemia [E78.5]     History of hepatitis C [Z86.19]     Chronic back pain [M54.9, G89.29]     Abnormality of gait and mobility with gait ataxia due to late effects of CVA, [facility] admit 06/08/17 [R26.9]     Bipolar depression ([facility] [F31.9]     Late effects of CVA (cerebrovascular accident) [I69.90]     Heroin abuse ([facility] [F11.10] 01/01/2014           Resume home cardiac meds. Ok to DC plavix as it has been more than two years since last intervention. Todd Pulling for discharge or transfer to rehab from cardiology perspective. No new changes today. Electronically signed by Melva Stewart.  Stan Jennings MD [facility] Director of Cardiology Services and Cardiac Catheterization Laboratory  [facility], [place]   on 8/6/2019 at 9:12 AM

## 2019-08-07 NOTE — PROGRESS NOTES
functional mobility ; Decreased endurance;Decreased ROM; Decreased balance; Increased Pain;Decreased high-level IADLs;Decreased ADL status; Decreased strength;Decreased safe awareness  Decision Making: Medium Complexity  History: high  Exam: med  Clinical Presentation: med    Prognosis: Good  Barriers to Learning: none - pt did require assistance to follow hip precautions due to pain and weakness    CLINICAL IMPRESSION: Pt presents with s/p hip fracture with hemiarthroplasty completed. He has hip precautions to follow. Pt is requiring assistance with all mobility as a result of fracture, weakness and pain. He was previously indep in mobility and lives alone. Pt is in need of PT prior to safe return to home    PLAN OF CARE:  Frequency: 1-2 treatment sessions per day, 5-7 days per week     Current Treatment Recommendations: Strengthening, Functional Mobility Training, ROM, Transfer Training, Gait Training, Neuromuscular Re-education, Safety Education & Training, Home Exercise Program, Equipment Evaluation, Education, & procurement, Modalities, Patient/Caregiver Education & Training, Pain Management, Balance Training, Stair training, Manual Therapy - Soft Tissue Mobilization    Patient's Goal: to go home walking with no help       GOALS:  Short term goals  Short term goal 1: Pt to complete HEP with indep  Short term goal 2: Pt to state and maintain 3/3 hip precautions during following activities.    Long term goals  Long term goal 1: Pt to complete all bed mobility with indep  Long term goal 2: Pt to complete all functional transfers (bed<>chair, STS, and car) with indep  Long term goal 3: Pt to ambulate 150ft with [employer] indep  Long term goal 4: Pt to manage flight of steps with HR and indep    ELOS:   Plan weeks: 1- 1 1/2     Therapy Time:    Individual   Time In 1100   Time Out 1130   Minutes [address] [employer] [place], [place], 08/07/19 at 11:43 AM

## 2019-08-07 NOTE — PROGRESS NOTES
Occupational Therapy   Occupational Therapy Initial Assessment  Date: 2019   Patient Name: Nick Dao  MRN: [id number]     : 1960    Date of Service: 2019    Discharge Recommendations:  Continue to assess pending progress       Assessment   Performance deficits / Impairments: Decreased functional mobility ; Decreased ADL status; Decreased balance;Decreased endurance;Decreased high-level IADLs;Decreased safe awareness  Assessment: Pt is a 60year-old male [facility] severe abnormality of gait and mobility with impaired self care due to Left Femoral Neck fracture s/p Left Hemiarthroplasty. Pt presents with above deficits limiting pt ability to complete ADLs, IADLs and transfers safely. Pt may benefit from skilled OT services in the inpatient rehabilitation setting to maximize pt independence with ADLs, IADLs and transfers for safe d/c home. Prognosis: Good  History: Multi comorb   Exam: 6 perf imp   Assistance / Modification: Mod A  OT Education: OT Role;Plan of Care;Precautions;Transfer Training  REQUIRES OT FOLLOW UP: Yes  Activity Tolerance  Activity Tolerance: Patient limited by pain  Safety Devices  Safety Devices in place: Yes  Type of devices: All fall risk precautions in place           Patient Diagnosis(es): There were no encounter diagnoses. has a past medical history of Abnormality of gait and mobility, Alcohol abuse, Anxiety, Ataxia, Bipolar affective (HCC), BPH (benign prostatic hyperplasia), CAD (coronary artery disease), CHF (congestive heart failure) (HCC), Chronic back pain, Cocaine abuse ([facility] [address] Depression, Heroin abuse ([facility] Hiatal hernia, History of hepatitis C, Hyperlipidemia, Hypertension, Late effects of CVA (cerebrovascular accident), Myocardial infarct, old, S/P CABG x 3, Tobacco abuse, and Transient ischemic attack.    has a past surgical history that includes transthoracic echocardiogram (5/15/2013); EXCISION / BIOPSY SKIN LESION OF ARM

## 2019-08-07 NOTE — PROGRESS NOTES
devices: (with PTA following session for eval)      Therapy Time:   Individual   Time In 1130   Time Out 1200   Minutes 30     Minutes:30      Transfer/Bed mobility trainin      Gait trainin      Neuro re education:0     Therapeutic ex:0      Lisa Bryant PTA, 19 at 11:46 AM

## 2019-08-07 NOTE — H&P
medically andis able to participate at acute level rehab but is too medically complex for SNF due to need for therapy at the acute level with at least 15 hours a week of PT OT and cognitive and recreational therapy at an acute level with daily medical monitoring. Imaging:    Imaging and other studies reviewed and discussed with patient and staff    Xr Chest   8/2/2019  EXAMINATION: CHEST PORTABLE VIEW  CLINICAL HISTORY: Generalized pain after fall COMPARISONS: September 25, 2018  FINDINGS: Single  views of the chest is submitted. There are multiple median sternotomy wires. The cardiac silhouette is of normal size configuration. The mediastinum is unremarkable. Pulmonary vascular unremarkable. Right sided trachea. No focal infiltrates. No Pneumothoraces. NO ACUTE ACTIVE CARDIOPULMONARY PROCESS    Xr Hip Left  8/2/2019  EXAMINATION:  XR HIP LEFT (1 VIEW) HISTORY:   post op/ AP view in pacu . TECHNIQUE:  XR HIP LEFT (1 VIEW) COMPARISON: 8/1/2019 RESULT: Interval left hip arthroplasty. Hardware intact with appropriate positioning. Soft tissue gas and edema from the recent surgery. No other significant abnormality. ---------------------------------------------     Postoperative changes without complication. Xr Femur Left  8/2/2019  XR FEMUR LEFT (MIN 2 VIEWS)  CLINICAL HISTORY: Generalized COMPARISON: Lumbar fracture or FINDINGS: Four views of the left femur are submitted. There is a left subcapital femoral neck fracture. No dislocations. No focal bony abnormalities                                                                                   SUBCAPITAL FEMORAL NECK FRACTURE    Ct Head Wo Contrast    Result Date: 8/2/2019  CT HEAD WO CONTRAST, CT CERVICAL SPINE WO CONTRAST: 8/1/2019 CLINICAL HISTORY:  fall from roof . COMPARISON: Head CT 2/6/2018.  TECHNIQUE: ROUTINE All CT scans at this facility use dose modulation, He has a normal mood and affect. His speech is normal and behavior is normal. Judgment and thought content normal. His mood appears not anxious. His affect is not angry, not blunt, not labile and not inappropriate. He is not agitated, not aggressive, not hyperactive, not slowed, not withdrawn, not actively hallucinating and not combative. Thought content is not paranoid and not delusional. Cognition and memory are normal. Cognition and memory are not impaired. He does not express impulsivity or inappropriate judgment. He does not exhibit a depressed mood. He expresses no homicidal and no suicidal ideation. He expresses no suicidal plans and no homicidal plans. He exhibits normal recent memory and normal remote memory. He is attentive. Vitals reviewed. Back Exam     Muscle Strength   Right Quadriceps:  2/5   Left Quadriceps:  4/5   Right Hamstrings:  4/5   Left Hamstrings:  4/5           Neurologic Exam     Mental Status   Oriented to person, place, and time. Speech: speech is normal   Level of consciousness: alert  Knowledge: good. Cranial Nerves     CN III, IV, VI   Pupils are equal, round, and reactive to light.   Extraocular motions are normal.     Motor Exam   Muscle bulk: decreased  Overall muscle tone: normal    Strength   Right neck flexion: 4/5  Left neck flexion: 4/5  Right neck extension: 4/5  Left neck extension: 4/5  Right deltoid: 4/5  Left deltoid: 4/5  Right biceps: 4/5  Left biceps: 4/5  Right triceps: 4/5  Left triceps: 4/5  Right wrist flexion: 4/5  Left wrist flexion: 4/5  Right wrist extension: 4/5  Left wrist extension: 4/5  Right interossei: 4/5  Left interossei: 4/5  Right abdominals: 4/5  Left abdominals: 4/5  Right iliopsoas: 1/5  Left iliopsoas: 4/5  Right quadriceps: 2/5  Left quadriceps: 4/5  Right hamstrin/5  Left hamstrin/5  Right glutei: 4/5  Left glutei: 4/5  Right anterior tibial: 4/5  Left anterior tibial: 4/5  Right posterior tibial: 4/5  Left posterior tibial:

## 2019-08-07 NOTE — PROGRESS NOTES
2105 Pt arrived to unit by bed from [facility]. VSS. Assessment complete. Pt c/o of severe pain to Lt hip. Medicated with a Percocet. Incision has aquacel to it, its clean, dry, and intact. LBM 8/6/19. Pt stated he has been up with a walker and assistance from therapy. Admission UA sent. Pt was oriented to room and rehab. Call light in reach. Will continue to monitor.

## 2019-08-07 NOTE — CONSULTS
GLUCOSEU Negative     -----------------------------------------------------------------   No results found. Assessment and Plan     Assessment and plan: This documentation may or may not be complete, inclusive or conclusive.     *Postop status post hip surgery.     *Hypertension. Fairly stable     *Polysubstance abuse including amphetamine, cocaine and marijuana.     *Mild anemia, no acute blood loss.     *Elevated liver enzymes, I found previous positive hepatitis C antibody.     *DVT prophylaxis.     *Lipidemia.     *BPH. Plan:  Continue current management and medications regimen. Continue Lovenox for DVT prophylaxis. I have ordered that case management to arrange outpatient appointment with infectious disease regarding his hepatitis C. This documentation may or may not be complete, inclusive or conclusive. Patient has a broad differential diagnosis that may not all be listed here or addressed. I may or may not have addressed all of this pt acute or chronic medical issues, abnormal labs or imaging. Portion of patient care is Rollo Cotto /has been provided by other providers and specialists. Patient is stable at this time. I will follow the patient on an as-needed basis. Please call or alert to the hospitalist if the patient develops any signs or symptoms that may require medical evaluation and intervention. I will be off service starting next week. 1 of my partners will take over. Ziggy Barnett MD  Admitting Hospitalist    TTS: 85mins where I focused more than 75% of my attention on rendering care, and planning treatment course for this patient, in addition to talking to RN team, mid levels, consulting with other physicians and following up on labs and imaging. High Risk Readmission Screening Tool Score Noted.      Emergency Contact:

## 2019-08-08 PROBLEM — G89.18 PAIN FOLLOWING SURGERY OR PROCEDURE: Status: ACTIVE | Noted: 2019-01-01

## 2019-08-08 NOTE — PROGRESS NOTES
[address] [facility] Session included:    Increased Socialization, Provided active listening and Benefits of the patient's leisure and recreation pursuits being utilized as stress relievers    Recommendations to utilize Recreation Therapy services, its supported services and groups include:            [facility], Pet Therapy, Leisure Education, Individual Music Therapy session, Independent leisure/recreational opportunities and activities for in room use, Coping skills, Stress management tools and Recreation Therapy Socialization Group    Comment(s):     ASSESSMENT  Patient tolerated todays session:  fair       Prior Activity Level: Very Active   Leisure Awareness:   poor      Lack of interest in the following:    [place] declines [place] [address] services. , Patient declines socialization group. and Independent leisure activities for in room use. Barriers to Leisure Participation:  Drug/Alcohol, Pain and Endurance  Comment(s):    Plan  Prior to discharge from rehab program:   COMPLETE  Patient will express interest in participating in the Pet Therapy program during their hospitalization. COMPLETE Patient will indicate current and prior leisure/recreational interests and state those they will follow through with post discharge. COMPLETE Patient will be provided with a large print cognitive/orientation/puzzle handout 5x/week. COMPLETE  Patient will identify the recreation therapy supported groups he has interest in attending.     Patient provided with the following accessible and independently used recreation/leisure resources when in hospital room:   5 Days week large print orientation/puzzle activity handout    EDUCATION   Was new education provided: Yes  Learner:   Patient  Education provided:    [place] [facility] Group  Method of Education: Discussion  Evaluation of Patients Response to Education:  Verbalized Understanding and No Family Present                         FIMS  Comprehension: 5 -

## 2019-08-08 NOTE — PLAN OF CARE
Plan of Care:  Coping  Educated and recommended to patient Rehab Support Group to help with coping. Patient reports he is interested in attending the rehab support group while an inpatient on rehab.  Electronically signed by Sandra Haney on 8/8/2019 at 11:19 AM

## 2019-08-08 NOTE — PROGRESS NOTES
Pt resting in bed. VSS. Assessment complete. Pt c/o 9/10 Left hip pain. Medicated with a Percocet. Aquacel is clean, dry, intact. No drainage or odor noted. Call light in reach. Will continue to monitor.

## 2019-08-08 NOTE — PROGRESS NOTES
craniovertebral junction through the T2-3 level. There is no fracture, significant subluxation, or acute paraspinous soft tissue abnormalities identified. Mild to moderate degenerative changes predominantly of the posterolateral implants of the mid to lower levels appear unchanged, with mild to moderate neural foraminal narrowing at C5-6 and C6-C7. FINAL IMPRESSION: NO ACUTE INTRACRANIAL PROCESS, FRACTURE, OR EVIDENCE OF CERVICAL SPINE INJURY IDENTIFIED. Ct Lumbar S : 8/2/2019  EXAMINATION:  CT SCAN LUMBAR SPINE CLINICAL HISTORY:  Generalized pain after fall COMPARISON: TECHNIQUE:  Multiple serial axial images of the lumbar spine from the T10 through the sacral levels with both sagittal coronal reconstruction was performed. FINDINGS:  There is multilevel degenerative change with marginal spurring osteophytic bridging. The disc spaces are intact. No significant spondylolisthesis. No acute fractures. Within the field-of-view there is degenerative changes of the SI joints right greater than left. NO ACUTE FRACTURES All CT scans at this facility use dose modulation, iterative reconstruction, and/or weight based dosing when appropriate to reduce radiation dose to as low as reasonably achievable. Ct Abdomen Pelvis  8/2/2019  Examination: CT ABDOMEN PELVIS W IV CONTRAST Indication:  Generalized pain after fall Technique: Multiple serial axial images was performed through the abdomen and pelvis utilizing 100cc of Optiray 370. Images were reconstructed in the axial and coronal and sagittal planes. Comparison:  No comparison is available. Findings: The visualized basal lungs show bibasilar areas of dependent atelectasis. The liver, , spleen, pancreas, adrenals, are unremarkable. The gallbladder surgically absent. The right kidney is unremarkable. There is less than a 2 mm nonobstructing calculi superior pole left kidney. Large and small bowel show no sign of obstruction.   There is stool scattered throughout the large bowel. The appendix is visualized. No periappendiceal stranding. No diverticulitis. The bladder is distended. Correlate clinically. Consider decompression. No free air. No free fluid. The visualized abdominal aorta is of normal size and caliber. No significant retroperitoneal adenopathy. Within the field-of-view there is a comminuted fracture left femoral neck. 1. COMMINUTED FRACTURE LEFT FEMORAL NECK. 2. NONOBSTRUCTING LEFT CALCULI. 3. THE REMAINDER THE CT SCAN THE ABDOMEN PELVIS OTHERWISE UNREMARKABLE OTHER FINDINGS DETAILED ABOVE        Previous extensive, complex labs, notes and diagnostics reviewed and analyzed. ALLERGIES:    Allergies as of 08/06/2019    (No Known Allergies)      (please also verify by checking MAR)     Today I evaluated this patient for periodic reassessment of medical and functional status. The patient was discussed in detail at the treatment team meeting focusing on current medical issues, progress in therapies, social issues, psychological issues, barriers to progress and strategies to address these barriers, and discharge planning. See the hand written addendum to rehab progress note. The patient continues to be high risk for future disability and their medical and rehabilitation prognosis continue to be good and therefore, we will continue the patient's rehabilitation course as planned. The patient's tentative discharge date was set. Patient and family education was discussed. The patient was made aware of the team discussion regarding their progress. Complex Physical Medicine & Rehab Issues Assess & Plan:   1. Severe abnormality of gait and mobility and impaired self-care and ADL's secondary to progressive acute left hip fracture at the femoral neck.   Functional and medical status reassessed regarding patients ability to participate in therapies and patient found to be able to participate in acute intensive comprehensive inpatient rehabilitation program including PT/OT to improve balance, ambulation, ADLs, and to improve the P/AROM. Therapeutic modifications regarding activities in therapies, place, amount of time per day and intensity of therapy made daily. In bed therapies or bedside therapies prn.   2. Bowel severe opiate related constipation and Bladder dysfunction:  frequent toileting, ambulate to bathroom with assistance, check post void residuals. Check for C.difficile x1 if >2 loose stools in 24 hours, continue bowel & bladder program.  Monitor bowel and bladder function. Lactinex 2 PO every AC. MOM prn, Brown Bomb prn, Glycerin suppository prn, enema prn.  3. Severe post fracture left hip pain generalized OA pain: reassess pain every shift and prior to and after each therapy session, give prn Tylenol and Percocet with caution due to history of opiate abuse as well as active opiate abuse consult chemical dependency, modalities prn in therapy, Lidoderm, K-pad prn.   4. Skin healing and breakdown risk:  continue pressure relief program.  Daily skin exams and reports from nursing. 5. Severe fatigue due to nutritional and hydration deficiency:  continue to monitor I&Os, calorie counts prn, dietary consult prn. Add vitamin B12 vitamin D and CoQ10  6. Acute episodic insomnia with situational adjustment disorder:  prn Ambien, monitor for day time sedation. 7. Falls risk elevated:  patient to use call light to get nursing assistance to get up, bed and chair alarm. 8. Elevated DVT risk: progressive activities in PT, continue prophylaxis WILLIAM hose, elevation and Lovenox adjust dose because of history of liver failure. 9. Complex discharge planning:  Weekly team meeting every Monday to assess progress towards goals, discuss and address social, psychological and medical comorbidities and to address difficulties they may be having progressing in therapy. Patient and family education is in progress.   The patient is to follow-up with their family physician

## 2019-08-08 NOTE — CONSULTS
Pulse 82   Temp 97.6 °F (36.4 °C) (Oral)   Resp 18   Ht 5' 10\" (1.778 m)   Wt 160 lb (72.6 kg)   SpO2 97%   BMI 22.96 kg/m²   Body mass index is 22.96 kg/m². CONSTITUTIONAL:  awake, alert, cooperative, no apparent distress, and appears stated age  EYES:  vision intact  ENT:  normocepalic, without obvious abnormality, atraumatic  NECK:  supple, symmetrical, trachea midline, skin normal and no stridor  BACK: Moderately limited lumbosacral range of motion especially on the left side left hip left sacroiliac joint and left posterior hip and left lumbosacral region, symmetric and no curvature  LUNGS:  no increased work of breathing and good air exchange  CARDIOVASCULAR:  regular rate and rhythm  ABDOMEN: Soft nontender nondistended  MUSCULOSKELETAL: Restricted and painful left hip flexion all over range of motion of the left hip has been compromised by pain, left thigh tenderness, greater contact tenderness on the left, left sacroiliac and left gluteal pyriformis muscle tenderness as well.  t otherwise rest of the joints here is no redness, warmth, or swelling of the joints  full range of motion noted  motor strength is 5 out of 5 all extremities bilaterally  tone is normal  NEUROLOGIC:  Mental Status Exam:  Level of Alertness:   awake  Orientation:   person, place, time  Memory:   normal  Fund of Knowledge:  normal  Cranial Nerves:  cranial nerves II-XII are grossly intact  Motor Exam:  Motor exam is symmetrical 5 out of 5 all extremities bilaterally  Sensory:  Sensory intact  SKIN:  no bruising or bleeding    DATA:   Diagnostic tests reviewed for today's visit:    All labs and imaging results reviewed.      Lab Results   Component Value Date    WBC 7.3 08/08/2019    HGB 12.8 08/08/2019    HCT 36.2 08/08/2019    MCV 93.3 08/08/2019     08/08/2019     08/06/2019    K 4.0 08/06/2019     08/06/2019    CO2 20 08/06/2019    BUN 18 08/06/2019    CREATININE 0.88 08/06/2019    CALCIUM 8.4 08/06/2019 FRACTURE LEFT FEMORAL NECK. 2. NONOBSTRUCTING LEFT CALCULI. 3. THE REMAINDER THE CT SCAN THE ABDOMEN PELVIS OTHERWISE UNREMARKABLE OTHER FINDINGS DETAILED ABOVE All CT scans at this facility use dose modulation, iterative reconstruction, and/or weight based dosing when appropriate to reduce radiation dose to as low as reasonably achievable. ASSESSMENT & PLAN  Active Hospital Problems    Diagnosis Date Noted    Pain following surgery or procedure [G89.18] 08/08/2019    Impaired gait and mobility [R26.89] 08/06/2019    CHF (congestive heart failure) (HCC) [I50.9] 08/06/2019    BPH (benign prostatic hyperplasia) [N40.0] 08/06/2019    Closed fracture of left femur ([facility] [S72.92XA] 08/01/2019    Polydrug abuse ([address] [F19.10] 10/23/2017    Dysphagia, oropharyngeal phase [R13.12] 06/13/2017    Conversion disorder [F44.9]     FAINA (generalized anxiety disorder) [F41.1]     Hepatitis C virus infection [B19.20] 06/08/2017    Abnormality of gait and mobility due to Left Femoral Neck Fracture s/p Left Hemiarthroplasty. [facility] admit 08/06/19. [R26.9]     Late effects of CVA (cerebrovascular accident) [I69.90]     Chronic back pain [M54.9, G89.29]     Hypertension [I10]     Hyperlipidemia [E78.5]     Bipolar depression ([address] [F31.9]     CAD (coronary artery disease) [I25.10]     Cocaine abuse ([address] [F14.10] 01/01/2014    Tobacco abuse [Z72.0] 01/01/2014    Heroin abuse ([address] [F11.10] 01/01/2014    Alcohol abuse [F10.10] 01/01/2014    Myocardial infarct, old [I25.2] 01/01/2010     60years old male, status post fall from higher letter steps, developed left femoral neck fracture, status post hemiarthroplasty, postoperative day #6.   Patient does have high risk for chronic opioid therapy, however has been going through intensive rehab I have addressed with patient findings with her drug screen but as well as appropriate management of his pain when performing appropriate rehab would be able to speed recovery, further adjustment and titration of opioids and weaning of all high risk opioid therapy prior to discharge will be necessary however managing his pain with appropriate medication regimen at this time should not cause any additional risk as patient is closely monitored here.     However subsequent drug testing in couple of days might be necessary to monitor compliance    Also discussed multimodal analgesia by adding muscle relaxant and other topical analgesics as well as adjusting his opioid treatment and adding less potent schedule opioid treatment    RECOMMENDATION:  SEE ORDERS    We will start Ultram 50 mg 3 times daily as a scheduled to help with his pain throughout the day  Maintain on the oxycodone for breakthrough pain with further titration and weaning as pain improves and further participation of therapy  Start patient on long-acting muscle relaxant twice daily like Norflex 100 g twice daily  Topical analgesics including analgesic cream, lidocaine patches, topical anti-inflammatory and intermittent dosages of intramuscular Toradol will be provided    I do not see any reason for chemical dependency at this time as well as patient is not a candidate for Suboxone however prior to discharge I will be directing patient to couple of resources including l\" LET'S GET REAL \" make an appointment for him    SIGNATURE: Beckie Collazo MD PATIENT NAME: Albert Golden   DATE: August 8, 2019 MRN: [id number]   TIME: 7:07 PM PAGER/CONTACT #: [phone]

## 2019-08-08 NOTE — PROGRESS NOTES
Pt complained of 8/10 pain this morning and was given percocet at 0812. Therapy tried working with him and he stated he was lightheaded and was nauseated. He was given pepcid at 0854 and bp at that time was 99/61, 111. Recheck at 1013 was 98/65, 103. At this time he is complaining of 8/10 pain and has taken his scheduled tylenol and prn percocet. His bp currently is 122/77, 82. He states he is feeling better.  Electronically signed by Suri Villa LPN on 4/0/0177 at 55:82 PM

## 2019-08-09 NOTE — PROGRESS NOTES
Occupational Therapy  Facility/Department: [facility]  Daily Treatment Note  NAME: Kamala Toledo  : 1960  MRN: [id number]    Date of Service: 2019    Discharge Recommendations:  Continue to assess pending progress       Assessment      Activity Tolerance  Activity Tolerance: Patient limited by pain  Safety Devices  Safety Devices in place: Yes  Type of devices: All fall risk precautions in place         Patient Diagnosis(es): There were no encounter diagnoses. has a past medical history of Abnormality of gait and mobility, Alcohol abuse, Anxiety, Ataxia, Bipolar affective (HCC), BPH (benign prostatic hyperplasia), CAD (coronary artery disease), CHF (congestive heart failure) ([facility]), Chronic back pain, Cocaine abuse ([facility] Depression, Heroin abuse ([facility] Hiatal hernia, History of hepatitis C, Hyperlipidemia, Hypertension, Late effects of CVA (cerebrovascular accident), Myocardial infarct, old, S/P CABG x 3, Tobacco abuse, and Transient ischemic attack. has a past surgical history that includes transthoracic echocardiogram (5/15/2013); EXCISION / BIOPSY SKIN LESION OF ARM (N/A, 2017); Coronary artery bypass graft; hernia repair (N/A, 2019); and HEMIARTHROPLASTY HIP (Left, 2019). Restrictions  Restrictions/Precautions  Restrictions/Precautions: Fall Risk, Weight Bearing  Required Braces or Orthoses?: No  Lower Extremity Weight Bearing Restrictions  Left Lower Extremity Weight Bearing: Weight Bearing As Tolerated  Position Activity Restriction  Hip Precautions: Posterior hip precautions  Subjective   General  Chart Reviewed: Yes  Patient assessed for rehabilitation services?: Yes  Referring Practitioner: Dr. Neil Villarreal  Diagnosis: Left Femoral Neck fracture s/p Left Hemiarthroplasty  Pain Assessment  Pain Assessment: 0-10  Pain Level: 7  Pain Type: Surgical pain  Pain Location: Hip  Pain Orientation: Left  Pain Descriptors: Aching; Throbbing  Pain Frequency:

## 2019-08-09 NOTE — PROGRESS NOTES
Physical Therapy Rehab Treatment Note  Facility/Department: [facility]  [facility]       NAME: Garvin Libman  : 1960 (60 y.o.)  MRN: [id number]  CODE STATUS: Full Code    Date of Service: 2019  Chart Reviewed: Yes  Family / Caregiver Present: No  General Comment  Comments: agreeable to tx    Restrictions:  Restrictions/Precautions: Fall Risk, Weight Bearing  Lower Extremity Weight Bearing Restrictions  Left Lower Extremity Weight Bearing: Weight Bearing As Tolerated  Position Activity Restriction  Hip Precautions: Posterior hip precautions       SUBJECTIVE: Subjective: \"its hard being in this bed\"  Pain Screening  Patient Currently in Pain: Yes  Pre Treatment Pain Screening  Pain at present: 8    Post Treatment Pain Screenin  Pain Assessment  Pain Assessment: 0-10  Pain Level: 8  Pain Type: Surgical pain  Pain Location: Hip  Pain Orientation: Left  Pain Descriptors: Aching; Throbbing  Pain Frequency: Continuous    OBJECTIVE:   Overall Orientation Status: Within Functional Limits    Bed mobility  Supine to Sit: Minimal assistance  Sit to Supine: Minimal assistance  Pt was able to use cane to get out of bed/mat with success . Transfers  Sit to Stand: Stand by assistance  Stand to sit: Stand by assistance    Ambulation  Ambulation?: Yes  Ambulation 1  Surface: carpet;level tile  Device: Rolling Walker  Assistance: Stand by assistance  Quality of Gait: slow pace, increased bracing on ww, antalgic gt pattern  Gait Deviations: Slow Farrah;Decreased head and trunk rotation  Distance: 150 feet  Comments: pt able to ambulate further this pm. states walking helps him with pain      Exercises  Gluteal Sets: x 20  Knee Short Arc Quad: 2x 10  Manual therapy  PROM: left leg while supine. pts leg very heavy and tight.  tolerated gentle rom and stretching  Other: MFR to left hip/thigh     ASSESSMENT/COMMENTS:pt tolerated session well but was in a lot of pain and did get some relief from with stretches

## 2019-08-09 NOTE — PROGRESS NOTES
Subjective: The patient complains of severe  acute left hip pain partially relieved by facet unrelieved with Tylenol and exacerbated by patient range of motion and weightbearing. I am concerned about patients poor tolerance for pain because of his psychiatric issues and substance control and abuse issues at baseline. Patient has been eager to for discharge however he is nowhere near ready to go home. He still healing his fracture incision he still needs significant help and his pain is not well controlled yet. However per nursing his vital signs do not match his pain and we are worried about drug-seeking given his history of alcohol and drug abuse which is constant and ongoing. He was eager for me to write a letter to her his . I will give it to him once he reaches his goals and his discharge date. I do not think that he would stay at very much longer after he had the letter. ROS x10: The patient also complains of severely impaired mobility and activities of daily living. Otherwise no new problems with vision, hearing, nose, mouth, throat, dermal, cardiovascular, GI, , pulmonary, musculoskeletal, psychiatric or neurological. See Rehab H&P on Rehab chart dated . Vital signs:  BP (!) 152/83   Pulse 88   Temp 98 °F (36.7 °C) (Oral)   Resp 18   Ht 5' 10\" (1.778 m)   Wt 160 lb (72.6 kg)   SpO2 99%   BMI 22.96 kg/m²   I/O:   PO/Intake:  fair PO intake, no problems observed or reported. Bowel/Bladder:  continent, no problems noted. General:  Patient is well developed, adequately nourished, non-obese and     well kempt. HEENT:    PERRLA, hearing intact to loud voice, external inspection of ear     and nose benign. Inspection of lips, tongue and gums benign  Musculoskeletal: No significant change in strength or tone. All joints stable. Inspection and palpation of digits and nails show no clubbing,       cyanosis or inflammatory conditions. Neuro/Psychiatric: Affect: flat but pleasant. Alert and oriented to person, place and     situation. No significant change in deep tendon reflexes or     Sensation-poor tolerance for pain  Lungs:  Diminished, CTA-B. Respiration effort is normal at rest.     Heart:   S1 = S2, RRR. No loud murmurs. Abdomen:  Soft, non-tender, no enlargement of liver or spleen. Extremities:  No significant lower extremity edema or tenderness. Skin:   Intact left hip incision, no visualized or palpated problems. Rehabilitation:  Physical therapy: FIMS:  Bed Mobility: Scooting: Contact guard assistance    Transfers: Sit to Stand: Stand by assistance  Stand to sit: Stand by assistance  Bed to Chair: Contact guard assistance  Comment: Pt educated on rehab schedule and 3hour rule. Advised that missed time would be subject to make up later. , Ambulation 1  Surface: carpet  Device: Rolling Walker  Assistance: Contact guard assistance  Quality of Gait: slow antalgic gait pattern. with heavy use of Sandeep UE support on AD during gait. Distance: 50ft x 2 with seated rest break between attempts. Comments: 8/10 pain reported,      FIMS: Bed, Chair, Wheel Chair: 4 - Requires steadying assistance only <25% assist  and/or requires assist with one leg only  Walk: 2 - Maximal Assistance Requires up to [address] requires assistance of one person to walk between  feet (Patient performs 25-49% of locomotion effort or goes between  feet)  Distance Walked: 50  Wheel Chair: 0 - Activity Not Assessed/Does Not Occur  Distance Traveled in Wheel Chair: 0  Stairs: 0 - Activity Does not Occur ( 0 only for the admission assessment),  , Assessment: Pt completed full PM tx session, pt declined to make up any missed tx time due to pain and fatigue levels following 60 min of tx. Pt willing to work through pain during this tx session, pt kept rest breaks to a minimal in time duration. Pt completed ambualtion 50ft x 2. CARDIOPULMONARY PROCESS    Xr Hip Left   8/2/2019  EXAMINATION:  XR HIP LEFT (1 VIEW) HISTORY:   post op/ AP view in pacu . TECHNIQUE:  XR HIP LEFT (1 VIEW) COMPARISON: 8/1/2019 RESULT: Interval left hip arthroplasty. Hardware intact with appropriate positioning. Soft tissue gas and edema from the recent surgery. No other significant abnormality. ---------------------------------------------     Postoperative changes without complication. Xr Femur Left   8/2/2019     SUBCAPITAL FEMORAL NECK FRACTURE    Ct Head Wo  8/2/2019    Head CT 2/6/2018. TECHNIQUE: ROUTINE All CT scans at this facility use dose modulation, iterative    FINAL IMPRESSION: NO ACUTE INTRACRANIAL PROCESS, FRACTURE, OR EVIDENCE OF CERVICAL SPINE INJURY IDENTIFIED. Ct Cervical Spine   8/2/2019     FINAL IMPRESSION: NO ACUTE INTRACRANIAL PROCESS, FRACTURE, OR EVIDENCE OF CERVICAL SPINE INJURY IDENTIFIED. Ct Lumbar S : 8/2/2019   NO ACUTE FRACTURES        Ct Abdomen Pelvis  8/2/2019  Examination: CT ABDOMEN PELVIS W IV CONTRAST Indication:  Generalized pain after fall Technique: Multiple serial axial    1. COMMINUTED FRACTURE LEFT FEMORAL NECK. 2. NONOBSTRUCTING LEFT CALCULI. 3. THE REMAINDER THE CT SCAN THE ABDOMEN PELVIS OTHERWISE UNREMARKABLE OTHER FINDINGS DETAILED ABOVE        Previous extensive, complex labs, notes and diagnostics reviewed and analyzed. ALLERGIES:    Allergies as of 08/06/2019    (No Known Allergies)      (please also verify by checking MAR)     Yesterday I evaluated this patient for periodic reassessment of medical and functional status. The patient was discussed in detail at the treatment team meeting focusing on current medical issues, progress in therapies, social issues, psychological issues, barriers to progress and strategies to address these barriers, and discharge planning. See the hand written addendum to rehab progress note.   The patient continues to be high risk for future disability and their

## 2019-08-09 NOTE — PROGRESS NOTES
Examined the patient in the therapy room. The patient is doing well. No chest pain or palpitation. No abdominal pain, nausea, vomiting, diarrhea, dysuria or hematuria. ROS: 12 system review otherwise is negative for acute signs or symptoms over the last 24 hrs.      Exam: Awake, alert oriented, in no apparent distress  HEENT: Pink conjunctiva and buccal mucosa.  Normal and throat and nose  Neck: Supple, no nuchal rigidity. Endo: No thyromegaly. Vascular: No JVD or carotid bruit. Chest: Clear to auscultation, no dullness to percussion. Heart: Regular rate and rhythm, no extra sounds.  No murmur, no rub. Abdomen: Soft, no tenderness, no rebound, no rigidity.  Clinically I could not exclude the possibility of intra-abdominal mass or organomegaly. LE: No cyanosis or clubbing, no varices or edema. Neuro: Awake, alert, oriented, normal speech, normal comprehension, normal extension, normal cranial nerves, normal and symmetrical motor and tone examination throughout. MS To be addressed by orthopedic team.  Skin: No skin rash, itching, bruising or significant findings.     Assessment and plan: This documentation may or may not be complete, inclusive or conclusive.     *Postop status post hip surgery.     *Hypertension. Fairly stable     *Polysubstance abuse including amphetamine, cocaine and marijuana.     *Mild anemia, no acute blood loss.     *Elevated liver enzymes, I found previous positive hepatitis C antibody.     *DVT prophylaxis.     *Lipidemia.     *BPH. Patient is doing well. Continue current therapy. I have asked nursing staff and case management here in rehab to arrange an outpatient appointment with Dr. Samira Pederson regarding hepatitis C. Please call us if patient develops any signs or symptoms that may require medical evaluation. I will be off service starting Monday. 1 of my partners will take over if needed.

## 2019-08-10 NOTE — PLAN OF CARE
Plan of Care  Coping  Patient declined attendance to the rehab support group. FELIX Vázquez communicated that pt did not want to attend.  Electronically signed by Patsy Bailey on 8/10/2019 at 4:53 PM

## 2019-08-10 NOTE — PROGRESS NOTES
Physical Therapy Rehab Treatment Note  Facility/Department: [facility]  [facility]       NAME: Sintia Castro  : 1960 (60 y.o.)  MRN: [id number]  CODE STATUS: Full Code    Date of Service: 8/10/2019  Chart Reviewed: Yes  Family / Caregiver Present: No  General Comment  Comments: meds given prior to tx    Restrictions:  Restrictions/Precautions: Fall Risk, Weight Bearing  Lower Extremity Weight Bearing Restrictions  Left Lower Extremity Weight Bearing: Weight Bearing As Tolerated  Position Activity Restriction  Hip Precautions: Posterior hip precautions       SUBJECTIVE: Subjective: \"i have a lot more pain this am, think i walked too much yesterday\"  Pain Screening  Patient Currently in Pain: Yes  Pre Treatment Pain Screening  Pain at present: 7  Scale Used: Numeric Score  Comments / Details: Hip pain. Ice PRN for comfort    Post Treatment Pain Screening[de-identified] 5  Pain Assessment  Pain Assessment: 0-10  Pain Level: 7  Pain Type: Surgical pain  Pain Location: Hip  Pain Orientation: Left  Pain Descriptors: Aching; Throbbing    OBJECTIVE:   Overall Orientation Status: Within Functional Limits       Bed mobility  Supine to Sit: Stand by assistance  Sit to Supine: Stand by assistance  Comment: pt able to use cane to lift left leg onto mat/bed    Transfers  Sit to Stand: Stand by assistance;Contact guard assistance  Stand to sit: Stand by assistance;Contact guard assistance    Ambulation  Ambulation?: Yes  Ambulation 1  Surface: level tile;carpet  Device: Rolling Walker  Assistance: Stand by assistance;Contact guard assistance  Quality of Gait: antalgic gt pattern, increased wb through upper extremities, mild trunk flexion  Gait Deviations: Slow Farrah;Decreased step length  Distance: 100 feet with turns  Comments: pt unable to ambulate as far as he did yesterday  Stairs/Curb  Stairs?: No(NT due to increased pain this am)    Activity Tolerance  Activity Tolerance: Patient limited by pain; Patient Tolerated treatment well    Exercises  Gluteal Sets: x 20  Knee Short Arc Quad: x 20 right x 20 left  Ankle Pumps: x 20  Manual therapy  PROM: left leg while supine. pts leg very heavy and tight. tolerated gentle rom and stretching  Other: MFR to left hip/thigh   Pt tolerated gentle stretches to left leg but was unable to tolerate much hip flexion in safe range. ASSESSMENT/COMMENTS:  Assessment: pt c/o increased pain this am and was unable to ambulate as far as prior tx. pt states the ice really helps calm his pain down. placed cold pack on anterior and posterior aspect of left leg. PLAN OF CARE/Safety:   Safety Devices  Type of devices:  All fall risk precautions in place  Therapy Time:   Individual   Time In 1000   Time Out 1100   Minutes 60     Minutes:60      Transfer/Bed mobility trainin      Gait training:10      Neuro re education:0     Therapeutic ex:15     Manual: 15      [facility] ANDRE MARINELLI PTA, 08/10/19 at 10:49 AM No

## 2019-08-10 NOTE — PROGRESS NOTES
Physical Therapy Rehab Treatment Note  Facility/Department: [facility]  [facility]       NAME: Keisha Sesay  : 1960 (60 y.o.)  MRN: [id number]  CODE STATUS: Full Code    Date of Service: 8/10/2019  Chart Reviewed: Yes  Family / Caregiver Present: No  General Comment  Comments: meds prior to tx    Restrictions:  Restrictions/Precautions: Fall Risk, Weight Bearing  Lower Extremity Weight Bearing Restrictions  Left Lower Extremity Weight Bearing: Weight Bearing As Tolerated  Position Activity Restriction  Hip Precautions: Posterior hip precautions       SUBJECTIVE: Subjective: \"i think i feel better this pm\"  Pain Screening  Patient Currently in Pain: Yes  Pre Treatment Pain Screening  Pain at present: 6  Scale Used: Numeric Score  Intervention List: Patient able to continue with treatment  Comments / Details: Hip pain. Ice PRN for comfort    Post Treatment Pain Screenin  Pain Assessment  Pain Assessment: 0-10  Pain Level: 6  Pain Type: Surgical pain  Pain Location: Hip  Pain Orientation: Left  Pain Descriptors: Aching; Throbbing  Pain Frequency: Continuous    OBJECTIVE:   Overall Orientation Status: Within Functional Limits         Bed mobility  Supine to Sit: Stand by assistance  Sit to Supine: Stand by assistance  Comment: successful with cane getting leg onto mat    Transfers  Sit to Stand: Stand by assistance  Stand to sit: Stand by assistance    Ambulation  Ambulation?: Yes  Ambulation 1  Surface: level tile;carpet  Device: Rolling Walker  Assistance: Stand by assistance  Quality of Gait: slow and steady, no lob with turns, follows hip precautions well  Gait Deviations: Slow Farrah;Decreased step length  Distance: 150 feet with turns  Comments: pt unable to ambulate as far as he did yesterday  Stairs/Curb  Stairs?: Yes   Stairs  # Steps : 4  Stairs Height: 6\"  Rails: Bilateral  Assistance: Stand by assistance  Comment: practiced stairs with 2 rails.      Activity Tolerance  Activity Tolerance: Patient limited by pain; Patient Tolerated treatment well    Exercises  Gluteal Sets: x 20  Knee Short Arc Quad: x 20 right x 20 left  Ankle Pumps: x 20  Manual therapy  PROM: left leg while supine. pts leg very heavy and tight. tolerated gentle rom and stretching  Other: hip flexor stretch on mat to left leg. ASSESSMENT/COMMENTS:  Assessment: pt c/o increased pain this am and was unable to ambulate as far as prior tx. pt states the ice really helps calm his pain down. placed cold pack on anterior and posterior aspect of left leg. PLAN OF CARE/Safety:   Safety Devices  Type of devices:  All fall risk precautions in place      Therapy Time:   Individual   Time In 1430   Time Out 1530   Minutes 60     Minutes:      Transfer/Bed mobility training:15      Gait training:15      Neuro re education:0     Therapeutic ex:15      Manual: 15      [facility] ANDRE MARINELLI PTA, 08/10/19 at 3:08 PM

## 2019-08-10 NOTE — PROGRESS NOTES
Occupational Therapy  Facility/Department: [facility]  Daily Treatment Note  NAME: Parviz Neal  : 1960  MRN: [id number]    Date of Service: 8/10/2019    Discharge Recommendations:  Continue to assess pending progress    Assessment: Pt requires frequent and prolonged rest breaks throughout session d/t pain and fatigue. Pt ultimately unable to tolerate entire session d/t pain. Therapist returns pt to room and assists him into bed. RN notified. Activity Tolerance  Activity Tolerance: Patient limited by fatigue;Patient limited by pain  Safety Devices  Safety Devices in place: Yes  Type of devices: All fall risk precautions in place      Patient Diagnosis(es): There were no encounter diagnoses. has a past medical history of Abnormality of gait and mobility, Alcohol abuse, Anxiety, Ataxia, Bipolar affective (HCC), BPH (benign prostatic hyperplasia), CAD (coronary artery disease), CHF (congestive heart failure) (HCC), Chronic back pain, Cocaine abuse ([address] Depression, Heroin abuse ([facility] [address] Hiatal hernia, History of hepatitis C, Hyperlipidemia, Hypertension, Late effects of CVA (cerebrovascular accident), Myocardial infarct, old, S/P CABG x 3, Tobacco abuse, and Transient ischemic attack. has a past surgical history that includes transthoracic echocardiogram (5/15/2013); EXCISION / BIOPSY SKIN LESION OF ARM (N/A, 2017); Coronary artery bypass graft; hernia repair (N/A, 2019); and HEMIARTHROPLASTY HIP (Left, 2019).     Restrictions  Restrictions/Precautions  Restrictions/Precautions: Fall Risk, Weight Bearing  Required Braces or Orthoses?: No  Lower Extremity Weight Bearing Restrictions  Left Lower Extremity Weight Bearing: Weight Bearing As Tolerated  Position Activity Restriction  Hip Precautions: Posterior hip precautions     Subjective  General  Chart Reviewed: Yes  Patient assessed for rehabilitation services?: Yes  Response to previous treatment: Patient with no complaints from container. Pt alternated hands as they became fatigued. Pt had Min difficulty with activity and worked at a steady pace with rest breaks prn. To improve hand fine motor coordination for mgmt of clothing fasteners/ADL containers in a timely manner. Plan  Plan  Times per week: 5-7x/wk  Plan weeks: LOS recommendations: 1-2 weeks  Current Treatment Recommendations: Strengthening, Functional Mobility Training, Endurance Training, Safety Education & Training, Equipment Evaluation, Education, & procurement, Self-Care / ADL, Patient/Caregiver Education & Training, Pain Management, Balance Training, Home Management Training, Modalities (comment)  Plan Comment: OT POC transferred to primary OT    Goals  Patient Goals   Patient goals : \"To get better. \"      Therapy Time   Individual Concurrent Group Co-treatment   Time In 1100         Time Out 1153         Minutes 53         Pt unable to tolerate entire session d/t significant pain. (-7 minutes).    Electronically signed by CATHY Diaz on 8/10/2019 at [facility] [facility]

## 2019-08-10 NOTE — PROGRESS NOTES
Subjective: The patient complains of severe  acute left hip pain partially relieved by facet unrelieved with Tylenol and exacerbated by patient range of motion and weightbearing. I am concerned about patients poor tolerance for pain because of his psychiatric issues and substance control and abuse issues at baseline. He is doing well with therapy is cooperative very antalgic but continues to need mod assist for loss of balance and contact-guard for ambulation. ROS x10: The patient also complains of severely impaired mobility and activities of daily living. Otherwise no new problems with vision, hearing, nose, mouth, throat, dermal, cardiovascular, GI, , pulmonary, musculoskeletal, psychiatric or neurological. See Rehab H&P on Rehab chart dated . Vital signs:  /82   Pulse 83   Temp 97 °F (36.1 °C) (Oral)   Resp 14   Ht 5' 10\" (1.778 m)   Wt 160 lb (72.6 kg)   SpO2 94%   BMI 22.96 kg/m²   I/O:   PO/Intake:  fair PO intake, no problems observed or reported. Bowel/Bladder:  continent, no problems noted. General:  Patient is well developed, adequately nourished, non-obese and     well kempt. HEENT:    PERRLA, hearing intact to loud voice, external inspection of ear     and nose benign. Inspection of lips, tongue and gums benign  Musculoskeletal: No significant change in strength or tone. All joints stable. Inspection and palpation of digits and nails show no clubbing,       cyanosis or inflammatory conditions. Neuro/Psychiatric: Affect: flat but pleasant. Alert and oriented to person, place and     situation. No significant change in deep tendon reflexes or     Sensation-poor tolerance for pain  Lungs:  Diminished, CTA-B. Respiration effort is normal at rest.     Heart:   S1 = S2, RRR. No loud murmurs. Abdomen:  Soft, non-tender, no enlargement of liver or spleen. Extremities:  No significant lower extremity edema or tenderness.   Skin:   Intact left hip

## 2019-08-11 NOTE — PROGRESS NOTES
0 - Activity does not occur  Shower Transfer: 3 - Moderate assistance, pt. expends 50%-74% effort,  , Assessment: Pt is a 60year-old male who presents to [facility] rehab with severe abnormality of gait and mobility with impaired self care due to Left Femoral Neck fracture s/p Left Hemiarthroplasty. Pt presents with above deficits limiting pt ability to complete ADLs, IADLs and transfers safely. Pt may benefit from skilled OT services in the inpatient rehabilitation setting to maximize pt independence with ADLs, IADLs and transfers for safe d/c home. Speech therapy: FIMS: Comprehension: 5 - Patient understands basic needs (hungry/hot/pain)  Expression: 5 - Expresses basic ideas/needs only (hungry/hot/pain)  Social Interaction: 5 - Patient is appropriate with supervision/cues  Problem Solvin - Patient able to solve simple/routine tasks  Memory: 5 - Patient requires prompting with stress/unfamiliar situations      Lab/X-ray studies reviewed, analyzed and discussed with patient and staff:   No results found for this or any previous visit (from the past 24 hour(s)). Xr Chest (  2019  NO ACUTE ACTIVE CARDIOPULMONARY PROCESS    Xr Hip Left   2019  EXAMINATION:  XR HIP LEFT (1 VIEW) HISTORY:   post op/ AP view in pacu . TECHNIQUE:  XR HIP LEFT (1 VIEW) COMPARISON: 2019 RESULT: Interval left hip arthroplasty. Hardware intact with appropriate positioning. Soft tissue gas and edema from the recent surgery. Xr Femur Left   2019     SUBCAPITAL FEMORAL NECK FRACTURE    Ct Head Wo  2019    Head CT 2018. TECHNIQUE: ROUTINE All CT scans at this facility use dose modulation, iterative    FINAL IMPRESSION: NO ACUTE INTRACRANIAL PROCESS, FRACTURE, OR EVIDENCE OF CERVICAL SPINE INJURY IDENTIFIED. Ct Cervical Spine   2019     FINAL IMPRESSION: NO ACUTE INTRACRANIAL PROCESS, FRACTURE, OR EVIDENCE OF CERVICAL SPINE INJURY IDENTIFIED.      Ct Lumbar S : 2019   NO ACUTE FRACTURES        Ct

## 2019-08-12 NOTE — PROGRESS NOTES
Pt resting in bed. VSS. Assessment complete. Pt c/o 9/10 left hip pain. Medicated with a Roxicodone and applied ice. Incision to left hip is glued, CATHY, no drainage, and no odor. Edges are well approximated. LBM 8/11. Call light in reach. Will continue to monitor.

## 2019-08-12 NOTE — PLAN OF CARE
Problem: Risk for Impaired Skin Integrity  Goal: Tissue integrity - skin and mucous membranes  Description  Structural intactness and normal physiological function of skin and  mucous membranes.   Outcome: Ongoing     Problem: Falls - Risk of:  Goal: Will remain free from falls  Description  Will remain free from falls  Outcome: Ongoing  Goal: Absence of physical injury  Description  Absence of physical injury  Outcome: Ongoing     Problem: Pain:  Goal: Pain level will decrease  Description  Pain level will decrease  Outcome: Ongoing  Goal: Control of acute pain  Description  Control of acute pain  Outcome: Ongoing  Goal: Control of chronic pain  Description  Control of chronic pain  Outcome: Ongoing

## 2019-08-12 NOTE — PROGRESS NOTES
pain  Pain Location: Hip  Pain Orientation: Left  Pain Descriptors: Aching; Throbbing  Pain Frequency: Continuous  Pre Treatment Pain Screening  Pain at present: 7  Scale Used: Numeric Score  Intervention List: Patient able to continue with treatment;Nurse called to administer meds  Vital Signs  Patient Currently in Pain: Yes   Orientation     Objective    Pt. transferred from the w/c to the  to ambulate into the bathroom for his ADL. Pt. was having moderate difficulty with taking his first several steps. Pt. stated he knows how to use the AE, however required min verbal cues for donning his pants and donning his socks. ADL  Equipment Provided: Reacher;Sock aid;Long-handled sponge;Dressing stick  Feeding: Modified independent   Grooming: Supervision  UE Bathing: Modified independent   LE Bathing: Stand by assistance  UE Dressing: Setup  LE Dressing: Contact guard assistance  Toileting: None(pt. did not need to go)     Toilet Transfers  Toilet Transfer: Unable to assess  Toilet Transfers Comments: pt. did not need to go  [address] - Transfer From: Kelsie Medinaina - Transfer Type: To and From  Shower - Transfer To: Shower seat with back  Shower - Technique: Ambulating  Shower Transfers: Contact Guard     Cognition  Arousal/Alertness: Appropriate responses to stimuli  Following Commands: Follows one step commands consistently; Follows multistep commands with increased time  Attention Span: Appears intact  Memory: Appears intact  Safety Judgement: Decreased awareness of need for safety  Problem Solving: Assistance required to identify errors made  Insights: Decreased awareness of deficits  Initiation: Does not require cues  Sequencing: Does not require cues        Plan   Plan  Times per week: 5-7x/wk  Plan weeks: LOS recommendations: 1-2 weeks  Current Treatment Recommendations: Strengthening, Functional Mobility Training, Endurance Training, Safety Education & Training, Equipment Evaluation, Education,

## 2019-08-12 NOTE — PROGRESS NOTES
Subjective: The patient complains of severe  acute left hip pain partially relieved by facet unrelieved with Tylenol and exacerbated by patient range of motion and weightbearing. I am concerned about patients poor tolerance for pain because of his psychiatric issues and substance control and abuse issues at baseline. I have titrated patient's medications slightly higher dosing regiment seems to be tolerating the pain better. We are monitoring closely for overuse abuse given his history of heroin abuse. Were having him use the Tylenol during the day and the OxyIR at night. ROS x10: The patient also complains of severely impaired mobility and activities of daily living. Otherwise no new problems with vision, hearing, nose, mouth, throat, dermal, cardiovascular, GI, , pulmonary, musculoskeletal, psychiatric or neurological. See Rehab H&P on Rehab chart dated . Vital signs:  /67   Pulse 78   Temp 98 °F (36.7 °C) (Oral)   Resp 18   Ht 5' 10\" (1.778 m)   Wt 160 lb (72.6 kg)   SpO2 96%   BMI 22.96 kg/m²   I/O:   PO/Intake:  fair PO intake, no problems observed or reported. Bowel/Bladder:  continent, no problems noted. General:  Patient is well developed, adequately nourished, non-obese and     well kempt. HEENT:    PERRLA, hearing intact to loud voice, external inspection of ear     and nose benign. Inspection of lips, tongue and gums benign  Musculoskeletal: No significant change in strength or tone. All joints stable. Inspection and palpation of digits and nails show no clubbing,       cyanosis or inflammatory conditions. Neuro/Psychiatric: Affect: flat but pleasant. Alert and oriented to person, place and     situation. No significant change in deep tendon reflexes or     Sensation-poor tolerance for pain  Lungs:  Diminished, CTA-B. Respiration effort is normal at rest.     Heart:   S1 = S2, RRR. No loud murmurs.     Abdomen:  Soft, non-tender, no enlargement of

## 2019-08-13 NOTE — PROGRESS NOTES
Facility/Department: [facility]  Physical Therapy Acute Rehab Discharge Summary  Room: [place] [address]    NAME: Tutu Hamilton  : 1960  MRN: [id number]    Admission Date: 2019  9:42 PM  Discharge Date: 2019    Rehab Diagnosis(es):   Abnormality of gait and mobility due to Left Femoral Neck Fracture s/p Left Hemiarthroplasty. [facility] Rehab admit 19. Patient Active Problem List    Diagnosis Date Noted    Pain following surgery or procedure 2019    CHF (congestive heart failure) ([address] 2019    BPH (benign prostatic hyperplasia) 2019    BMI 21.0-21.9, adult 2019    Impaired gait and mobility 2019    Closed fracture of left femur ([address]     Umbilical hernia without obstruction and without gangrene 2019    Bipolar 1 disorder, depressed ([address] 2018    Right inguinal hernia 2018    Major depressive disorder, recurrent ([address] 2018    Polydrug abuse ([address] 10/23/2017    Hypokalemia 10/22/2017    Transient ischemic attack     Dysphagia, oropharyngeal phase 2017    FAINA (generalized anxiety disorder)     Conversion disorder     Affective psychosis, bipolar ([address] 2017    Hepatitis C virus infection 2017    BP (high blood pressure) 2017    Abnormality of gait and mobility due to Left Femoral Neck Fracture s/p Left Hemiarthroplasty. [facility] Rehab admit 19.      Anxiety     Ataxia     CAD (coronary artery disease)     Chronic back pain     Bipolar depression (HCC)     History of hepatitis C     Hyperlipidemia     Hypertension     Late effects of CVA (cerebrovascular accident)     S/P CABG x 3     Leukocytosis     CVA (cerebral vascular accident) ([address] 2017    Alcohol abuse 2014    Cocaine abuse ([address] 2014    Heroin abuse ([address] 2014    Tobacco abuse 2014    Myocardial infarct, old 2010       Past Medical History:   Diagnosis Date    Abnormality of

## 2019-08-16 NOTE — ED PROVIDER NOTES
Cocaine abuse [facility]     Depression     Heroin abuse ([facility] 2014    Hiatal hernia     History of hepatitis C     Hyperlipidemia     Hypertension     Late effects of CVA (cerebrovascular accident)     Myocardial infarct, old 2010    S/P CABG x 3     Tobacco abuse 2014    Transient ischemic attack          SURGICAL HISTORY       Past Surgical History:   Procedure Laterality Date    CORONARY ARTERY BYPASS GRAFT      EXCISION / BIOPSY SKIN LESION OF ARM N/A 4/8/2017    I&D DEBRIDEMENT NECROTIC WOUND ABSCESS LEFT MEDIAL ELBOW AND RIGHT FOREARM  performed by Ovidio Vo MD at [address] HEMIARTHROPLASTY HIP Left 8/2/2019    LEFT HIP HEMIARTHROPLASTY performed by Ace oBnds MD at [address] 2/5/2019    RIGHT INGUINAL HERNIA REPAIR performed by Polo Ayala MD at [facility] ECHOCARDIOGRAM  5/15/2013         CURRENT MEDICATIONS       Previous Medications    ALBUTEROL (PROAIR HFA) 108 (90 BASE) MCG/ACT INHALER    Inhale 2 puffs into the lungs every 6 hours as needed for Wheezing.     ASPIRIN 81 MG TABLET    Take 81 mg by mouth daily    ATORVASTATIN (LIPITOR) 80 MG TABLET    Take 80 mg by mouth nightly    CHOLECALCIFEROL (VITAMIN D3) 2000 UNITS CAPS    Take 2,000 Units by mouth daily    CLOPIDOGREL (PLAVIX) 75 MG TABLET    Take 75 mg by mouth daily    CLORAZEPATE (TRANXENE) 7.5 MG TABLET    TAKE 1 TABLET TWICE DAILY AS NEEDED    DICYCLOMINE (BENTYL) 20 MG TABLET    Take 1 tablet by mouth 4 times daily As needed for abdominal pain    HYDROXYZINE (VISTARIL) 50 MG CAPSULE    Take 50 mg by mouth 2 times daily    IBUPROFEN (ADVIL;MOTRIN) 800 MG TABLET    Take 1 tablet by mouth every 8 hours as needed for Pain    KETOROLAC (TORADOL) 10 MG TABLET    Take 1 tablet by mouth every 6 hours as needed for Pain    LISINOPRIL (PRINIVIL;ZESTRIL) 2.5 MG TABLET    Take 1 tablet by mouth daily    METOPROLOL TARTRATE (LOPRESSOR) 25 MG TABLET    Take 0.5 tablets by mouth 2 times daily MIRTAZAPINE (REMERON) 15 MG TABLET    Take 7.5 mg by mouth nightly    OXCARBAZEPINE (TRILEPTAL) 300 MG TABLET    Take 1 tablet by mouth 2 times daily    QUETIAPINE (SEROQUEL XR) 300 MG EXTENDED RELEASE TABLET    Take 600 mg by mouth nightly    TAMSULOSIN (FLOMAX) 0.4 MG CAPSULE    Take 0.4 mg by mouth daily       ALLERGIES     Patient has no known allergies. FAMILY HISTORY       Family History   Family history unknown: Yes          SOCIAL HISTORY       Social History     Socioeconomic History    Marital status:      Spouse name: Not on file    Number of children: 1    Years of education: 15    Highest education level: Not on file   Occupational History    Occupation: On disability for substance abuse and back pain     Comment: Previously worked as a    Social Needs    Financial resource strain: Very hard   [address] insecurity:     Worry: Sometimes true     Inability: Sometimes true    Transportation needs:     Medical: Yes     Non-medical: Yes   Tobacco Use    Smoking status: Current Every Day Smoker     Packs/day: 2.00     Years: 45.00     Pack years: 90.00     Types: Cigarettes    Smokeless tobacco: Current User   Substance and Sexual Activity    Alcohol use: Yes     Comment: social    Drug use: Yes     Frequency: 1.0 times per week     Types: Opiates , Cocaine, Other-see comments     Comment: heroin, crack, xanax    Sexual activity: Yes     Partners: Female   Lifestyle    Physical activity:     Days per week: 2 days     Minutes per session: 30 min    Stress: To some extent   Relationships    Social connections:     Talks on phone: Twice a week     Gets together: Once a week     Attends [employer] service: 1 to 4 times per year     Active member of club or organization: No     Attends meetings of clubs or organizations: Never     Relationship status:     Intimate partner violence:     Fear of current or ex partner: No     Emotionally abused: No     Physically abused:  No Judgment and thought content normal.       DIAGNOSTIC RESULTS     EKG:All EKG's are interpreted by the Emergency Department Physician who either signs or Co-signs this chart in the absence of a cardiologist.        RADIOLOGY:   Non-plain film images such as CT, Ultrasound and MRI are read by theradiologist. Plain radiographic images are visualized and preliminarily interpreted by the emergency physician with the below findings:    Interpretation per theRadiologist below, if available at the time of this note:    No orders to display           LABS:  Labs Reviewed - No data to display    All other labs were within normal range or not returned as of this dictation. EMERGENCY DEPARTMENT COURSE and DIFFERENTIAL DIAGNOSIS/MDM:   Vitals:    Vitals:    08/16/19 0425   BP: 129/71   Pulse: 115   Resp: 14   Temp: 98.5 °F (36.9 °C)   TempSrc: Oral   SpO2: 98%   Weight: 156 lb (70.8 kg)   Height: 5' 10\" (1.778 m)         MDM    Pt will be given fentanyl IM and dc'd. He is a polysubstance abuser and I do not feel comfortable sending him home with controlled substances. He did see pain management after his surgery. Standard anticipatory guidance given to patient upon discharge. Have given them a specific time frame in which to follow-up and who to follow-up with. I have also advised them that they should return to the emergency department if they get worse, or not getting better or develop any new or concerning symptoms. Patient demonstrates understanding. CRITICAL CARE TIME   Total Critical Caretime was 0 minutes, excluding separately reportable procedures. There was a high probability of clinically significant/life threatening deterioration in the patient's condition which required my urgent intervention. Procedures    FINAL IMPRESSION      1.  Left hip pain          DISPOSITION/PLAN   DISPOSITION Decision To Discharge 08/16/2019 04:29:42 AM      PATIENT REFERRED TO:  Wilner Fisher MD  [id number]

## 2019-08-27 NOTE — DISCHARGE SUMMARY
Subjective: The patient complains of severe  acute left hip pain partially relieved by facet unrelieved with Tylenol and exacerbated by patient range of motion and weightbearing. I am concerned about patients poor tolerance for pain because of his psychiatric issues and substance control and abuse issues at baseline. I have titrated patient's medications slightly higher dosing regiment seems to be tolerating the pain better. We are monitoring closely for overuse abuse given his history of heroin abuse. Were having him use the Tylenol during the day and the OxyIR at night. Note the patient left AMA on 8/12/2019 he did not tell anybody who is leaving his neck into the parking lot and left with friends. ROS x10: The patient also complains of severely impaired mobility and activities of daily living. Otherwise no new problems with vision, hearing, nose, mouth, throat, dermal, cardiovascular, GI, , pulmonary, musculoskeletal, psychiatric or neurological. See Rehab H&P on Rehab chart dated . Vital signs:  /67   Pulse 84   Temp 98 °F (36.7 °C) (Oral)   Resp 18   Ht 5' 10\" (1.778 m)   Wt 160 lb (72.6 kg)   SpO2 96%   BMI 22.96 kg/m²   I/O:   PO/Intake:  fair PO intake, no problems observed or reported. Bowel/Bladder:  continent, no problems noted. General:  Patient is well developed, adequately nourished, non-obese and     well kempt. HEENT:    PERRLA, hearing intact to loud voice, external inspection of ear     and nose benign. Inspection of lips, tongue and gums benign  Musculoskeletal: No significant change in strength or tone. All joints stable. Inspection and palpation of digits and nails show no clubbing,       cyanosis or inflammatory conditions. Neuro/Psychiatric: Affect: flat but pleasant. Alert and oriented to person, place and     situation.   No significant change in deep tendon reflexes or     Sensation-poor tolerance for pain  Lungs:  Diminished, CTA-B.

## 2020-01-01 ENCOUNTER — HOSPITAL ENCOUNTER (EMERGENCY)
Age: 60
End: 2020-01-24
Attending: EMERGENCY MEDICINE
Payer: COMMERCIAL

## 2020-01-01 ENCOUNTER — APPOINTMENT (OUTPATIENT)
Dept: GENERAL RADIOLOGY | Age: 60
End: 2020-01-01
Payer: COMMERCIAL

## 2020-01-01 PROCEDURE — 92950 HEART/LUNG RESUSCITATION CPR: CPT

## 2020-01-01 PROCEDURE — 31500 INSERT EMERGENCY AIRWAY: CPT

## 2020-01-01 PROCEDURE — 99285 EMERGENCY DEPT VISIT HI MDM: CPT

## 2020-01-01 RX ORDER — 0.9 % SODIUM CHLORIDE 0.9 %
1000 INTRAVENOUS SOLUTION INTRAVENOUS ONCE
Status: DISCONTINUED | OUTPATIENT
Start: 2020-01-01 | End: 2020-01-01 | Stop reason: HOSPADM

## 2020-01-24 NOTE — CODE DOCUMENTATION
PT SENT TO THE [facility] WITH BELONGINGS. SUPERVISOR MOISES MADE AWARE OF NEED [facility].

## 2020-01-24 NOTE — CARE COORDINATION
Call placed to Emergency Contact listed on demographics page: Bernard Kuldipdev @ [phone], spoke to her and requested her presence in the ED. Reported she would arrive in about 10 minutes.

## 2020-01-24 NOTE — ED PROVIDER NOTES
TRANSTHORACIC ECHOCARDIOGRAM  5/15/2013         CURRENT MEDICATIONS       Previous Medications    ALBUTEROL (PROAIR HFA) 108 (90 BASE) MCG/ACT INHALER    Inhale 2 puffs into the lungs every 6 hours as needed for Wheezing. ASPIRIN 81 MG TABLET    Take 81 mg by mouth daily    ATORVASTATIN (LIPITOR) 80 MG TABLET    Take 80 mg by mouth nightly    CHOLECALCIFEROL (VITAMIN D3) 2000 UNITS CAPS    Take 2,000 Units by mouth daily    CLOPIDOGREL (PLAVIX) 75 MG TABLET    Take 75 mg by mouth daily    CLORAZEPATE (TRANXENE) 7.5 MG TABLET    TAKE 1 TABLET TWICE DAILY AS NEEDED    DICYCLOMINE (BENTYL) 20 MG TABLET    Take 1 tablet by mouth 4 times daily As needed for abdominal pain    HYDROXYZINE (VISTARIL) 50 MG CAPSULE    Take 50 mg by mouth 2 times daily    IBUPROFEN (ADVIL;MOTRIN) 800 MG TABLET    Take 1 tablet by mouth every 8 hours as needed for Pain    KETOROLAC (TORADOL) 10 MG TABLET    Take 1 tablet by mouth every 6 hours as needed for Pain    LISINOPRIL (PRINIVIL;ZESTRIL) 2.5 MG TABLET    Take 1 tablet by mouth daily    METOPROLOL TARTRATE (LOPRESSOR) 25 MG TABLET    Take 0.5 tablets by mouth 2 times daily    MIRTAZAPINE (REMERON) 15 MG TABLET    Take 7.5 mg by mouth nightly    OXCARBAZEPINE (TRILEPTAL) 300 MG TABLET    Take 1 tablet by mouth 2 times daily    QUETIAPINE (SEROQUEL XR) 300 MG EXTENDED RELEASE TABLET    Take 600 mg by mouth nightly    TAMSULOSIN (FLOMAX) 0.4 MG CAPSULE    Take 0.4 mg by mouth daily       ALLERGIES     Patient has no known allergies.     FAMILY HISTORY       Family History   Family history unknown: Yes          SOCIAL HISTORY       Social History     Socioeconomic History    Marital status:      Spouse name: Not on file    Number of children: 1    Years of education: 15    Highest education level: Not on file   Occupational History    Occupation: On disability for substance abuse and back pain     Comment: Previously worked as a    Social Needs    Financial resource Exam  Vitals signs and nursing note reviewed. Constitutional:       Appearance: He is well-developed. Comments: Unresponsive, vomitus noted in airway   HENT:      Head: Normocephalic. Right Ear: External ear normal.      Left Ear: External ear normal.   Eyes:      Comments: Dilated, unresponsive   Neck:      Musculoskeletal: Neck supple. Cardiovascular:      Comments: No pulses  Pulmonary:      Comments: No spontaneous respirations  Abdominal:      General: There is no distension. Palpations: Abdomen is soft. Musculoskeletal: Normal range of motion. Skin:     General: Skin is warm and dry. Neurological:      Comments: GCS 3           LABS:  Labs Reviewed   CULTURE BLOOD #2   CULTURE BLOOD #1   BLOOD GAS, ARTERIAL   COMPREHENSIVE METABOLIC PANEL   URINE DRUG SCREEN   ETHANOL   CBC WITH AUTO DIFFERENTIAL   MAGNESIUM   TROPONIN   URINALYSIS   TSH WITHOUT REFLEX   APTT   PROTIME-INR         MDM:   Vitals: There were no vitals filed for this visit. 61 yo male presents to the ED in cardiac arrest.  Pt arrived in the ED, pulseless, apneic. Pt was intubated in the ED. CPR continued in the ED. Pt given IV epi, IV bicarb, IV d50, IV Ca with ROSC. After about 2 min of ROSC, pt then lost his pulse again. Please see nursing documentation for time of medications given. After 25 min of pt being pulseless, pt was pronounced in the ED.  was called and found benzos, heroin, opiates, methamphetamines in the pt's system. Family informed. CRITICAL CARE TIME   Total CriticalCare time was 36 minutes, excluding separately reportable procedures. There was a high probability of clinically significant/life threatening deterioration in the patient's condition which required my urgent intervention. PROCEDURES:  Unlessotherwise noted below, none      Procedures      FINAL IMPRESSION      1. Cardiac arrest ([facility]    2.  Polysubstance abuse ([facility]          DISPOSITION/PLAN   DISPOSITION  2020 10:36:25 AM          Jonna Ozuna MD (electronically signed)  Attending Emergency Physician          Jonna Ozuna MD  20 [id number]

## 2020-01-24 NOTE — ED NOTES
INITIAL REPORT FROM EMS ,PT FOUND UNRESPONSIVE BY FAMILY, RESPONDING TO PAIN  STIMULI. BP AT THAT TIME 222/117- PULSE 35. #18 EJ WAS STARTED. WHILE TRANSFERRING PT TO [facility],HE WENT INTO CARDIAC ARREST, ON ARRIVAL TO [facility] CPR WAS IN PROGRESS. RHYTHM ASYSTOLE.  EMS GAVE EPI X1,NARCAN 2MG. REMAINED IN ASYSTOLE.      Flavia Marmolejo RN  01/24/20 [id number]

## 2022-06-28 NOTE — PROGRESS NOTES
1030: Bedside and Verbal shift change report given to TAYLOR Cole RN (oncoming nurse) by NARENDRA Frye RN (offgoing nurse). Report included the following information SBAR, Kardex, Intake/Output and MAR.    1100: Assessment completed. Abdominal breathing noted. HFNC decreased to 2/100, per verbal order from MD Zeb Rodriguez. Pt has chronic systolic heart failure, none acute.

## (undated) DEVICE — Z DISCONTINUED USE 2271144 DRAIN SURG W0.25XL18IN PRECUT UNIF WALL THICKNESS PENROSE

## (undated) DEVICE — GOWN,AURORA,NONREINFORCED,LARGE: Brand: MEDLINE

## (undated) DEVICE — SKIN MARKER,REGULAR TIP WITH RULER: Brand: DEVON

## (undated) DEVICE — MAT FLOOR ULTRA ABS 28X48IN

## (undated) DEVICE — SUTURE VCRL SZ 1 L36IN ABSRB UD L36MM CT-1 1/2 CIR J947H

## (undated) DEVICE — MEDI-VAC NON-CONDUCTIVE SUCTION TUBING: Brand: CARDINAL HEALTH

## (undated) DEVICE — INTENDED FOR TISSUE SEPARATION, AND OTHER PROCEDURES THAT REQUIRE A SHARP SURGICAL BLADE TO PUNCTURE OR CUT.: Brand: BARD-PARKER ® CARBON RIB-BACK BLADES

## (undated) DEVICE — SPONGE: LAP 4X18 W XR 200/CS: Brand: MEDICAL ACTION INDUSTRIES

## (undated) DEVICE — 2000CC GUARDIAN II: Brand: GUARDIAN

## (undated) DEVICE — SYRINGE BLB 50CC IRRIG PLIABLE FNGR FLNG GRAD FLSK DISP

## (undated) DEVICE — CHLORAPREP 26ML ORANGE

## (undated) DEVICE — GLOVE ORANGE PI 7 1/2   MSG9075

## (undated) DEVICE — PILLOW POS W15XH6XL22IN RASPBERRY FOAM ABD W/ STRP DISP FOR

## (undated) DEVICE — PENCIL ES L3M BTTN SWCH HOLSTER W/ BLDE ELECTRD EDGE

## (undated) DEVICE — PACK PROCEDURE SURG TOT HIP DRP

## (undated) DEVICE — ELECTRODE PT RET AD L9FT HI MOIST COND ADH HYDRGEL CORDED

## (undated) DEVICE — CONVERTED USE 291618 SPONGE LAPAROTOMY POCKET POUCH RING 18

## (undated) DEVICE — 3M™ STERI-STRIP™ REINFORCED ADHESIVE SKIN CLOSURES, R1547, 1/2 IN X 4 IN (12 MM X 100 MM), 6 STRIPS/ENVELOPE: Brand: 3M™ STERI-STRIP™

## (undated) DEVICE — SUTURE MCRYL SZ 4-0 L27IN ABSRB UD L19MM PS-2 1/2 CIR PRIM Y426H

## (undated) DEVICE — SPONGE DRN W4XL4IN RAYON/POLYESTER 6 PLY NONWOVEN PRECUT

## (undated) DEVICE — BLADE ES ELASTOMERIC COAT INSUL DURABLE BEND UPTO 90DEG

## (undated) DEVICE — Device

## (undated) DEVICE — SUTURE VCRL SZ 2-0 L54IN ABSRB VLT LIGAPAK REEL NDL J206G

## (undated) DEVICE — Z CONVERTED USE 2271043 CONTAINER SPEC COLL 4OZ SCR ON LID PEEL PCH

## (undated) DEVICE — SUTURE VCRL + SZ 3-0 L36IN ABSRB UD L36MM CT-1 1/2 CIR VCP944H

## (undated) DEVICE — PACK,LAPAROTOMY,NO GOWNS: Brand: MEDLINE

## (undated) DEVICE — DISCONTINUED USE 393278 SYRINGE 10 ML HYPO W/O NDL LL TP PLSTC ST

## (undated) DEVICE — SUTURE TICRN BR BL NDL C-20 SZ 5 30 8886302779

## (undated) DEVICE — NEEDLE HYPO 25GA L1.5IN BVL ORIENTED ECLIPSE

## (undated) DEVICE — STERILE LATEX POWDER-FREE SURGICAL GLOVESWITH NITRILE COATING: Brand: PROTEXIS

## (undated) DEVICE — Z DISCONTINUED USE 2744636  DRESSING AQUACEL 14 IN ALG W3.5XL14IN POLYUR FLM CVR W/ HYDRCOLL

## (undated) DEVICE — 1842 FOAM BLOCK NEEDLE COUNTER: Brand: DEVON

## (undated) DEVICE — SUTURE VCRL SZ 4-0 L18IN ABSRB UD L19MM PS-2 3/8 CIR PRIM J496H

## (undated) DEVICE — SUTURE VCRL + SZ 2-0 L36IN ABSRB UD L36MM CT-1 1/2 CIR VCP945H

## (undated) DEVICE — SUTURE PERMAHAND SZ 3-0 L30IN NONABSORBABLE BLK SH L26MM K832H

## (undated) DEVICE — SUPER SPONGES,MEDIUM: Brand: KERLIX

## (undated) DEVICE — CEMENT MIXING SYSTEM WITH FEMORAL BREAKWAY NOZZLE: Brand: REVOLUTION

## (undated) DEVICE — SIPS DUAL 2 MINUTE TIP

## (undated) DEVICE — SLEEVE CMPR SM STD CALF SCD ANEMB LF

## (undated) DEVICE — MARKER SURG SKIN GENTIAN VLT REG TIP W/ 6IN RUL

## (undated) DEVICE — COUNTER NDL 40 COUNT HLD 70 FOAM BLK ADH W/ MAG

## (undated) DEVICE — LABEL MED MINI W/ MARKER

## (undated) DEVICE — 3M™ STERI-DRAPE™ U-DRAPE 1015: Brand: STERI-DRAPE™

## (undated) DEVICE — SPONGE,LAP,18"X18",DLX,XR,ST,5/PK,40/PK: Brand: MEDLINE

## (undated) DEVICE — GLOVE ORANGE PI 8   MSG9080

## (undated) DEVICE — BLADE SAW W098XL276IN THK0025IN CUT THK0039IN REPL SAG

## (undated) DEVICE — GAUZE,SPONGE,4"X4",12PLY,STERILE,LF,2'S: Brand: MEDLINE

## (undated) DEVICE — COVER LT HNDL BLU PLAS

## (undated) DEVICE — PENROSE TUBING RADIOPAQUE: Brand: ARGYLE

## (undated) DEVICE — TOTAL TRAY, DB, 100% SILI FOLEY, 16FR 10: Brand: MEDLINE

## (undated) DEVICE — TOWEL,OR,DSP,ST,BLUE,STD,4/PK,20PK/CS: Brand: MEDLINE

## (undated) DEVICE — SECTO® DISSECTOR, KITTNER, 5/16 IN DIAMETER, (5 EA/POUCH, 24 POUCHES/PK, 4 PK/BX): Brand: SYMMETRY SURGICAL

## (undated) DEVICE — NEEDLE HYPO 22GA L1 1/2IN PIVOTING SHLD FOR LUERLOCK SYR

## (undated) DEVICE — SUTURE MCRYL + SZ 4-0 L27IN ABSRB UD L19MM PS-2 3/8 CIR MCP426H

## (undated) DEVICE — BANDAGE GZ W45INXL4 1 10YD FLUF RL 6 PLY DERMACEA

## (undated) DEVICE — NEEDLE HYPO 25GA L1.5IN BLU POLYPR HUB S STL REG BVL STR

## (undated) DEVICE — GOWN,AURORA,NONRNF,XL,30/CS: Brand: MEDLINE

## (undated) DEVICE — ELECTROSURGICAL PENCIL BUTTON SWITCH E-Z CLEAN COATED BLADE ELECTRODE 10 FT (3 M) CORD HOLSTER: Brand: MEGADYNE

## (undated) DEVICE — SUTURE PROL SZ 0 L30IN NONABSORBABLE BLU SH L26MM 1/2 CIR 8834H

## (undated) DEVICE — Z DISCONTINUED NO SUB IDED GLOVE SURG BEAD CUF 8 STD PF WHT STRL TRIUMPH LT LTX

## (undated) DEVICE — Z DISCONTINUED NO SUB IDED DRAIN PENROSE L12IN 0.25IN USED TO PROMOTE DRNAGE IN OPN

## (undated) DEVICE — FEMORAL CANAL BRUSH, IRRIGATION/SUCTION

## (undated) DEVICE — TRAY PREP DRY W/ PREM GLV 2 APPL 6 SPNG 2 UNDPD 1 OVERWRAP

## (undated) DEVICE — SYRINGE MED 10ML TRNSLUC BRL PLUNG BLK MRK POLYPR CTRL

## (undated) DEVICE — PAD N ADH W3XL4IN POLY COT SFT PERF FLM EASILY CUT ABSRB